# Patient Record
Sex: FEMALE | Race: WHITE | NOT HISPANIC OR LATINO | Employment: OTHER | URBAN - METROPOLITAN AREA
[De-identification: names, ages, dates, MRNs, and addresses within clinical notes are randomized per-mention and may not be internally consistent; named-entity substitution may affect disease eponyms.]

---

## 2017-01-18 ENCOUNTER — LAB (OUTPATIENT)
Dept: LAB | Facility: HOSPITAL | Age: 77
End: 2017-01-18

## 2017-01-18 ENCOUNTER — OFFICE VISIT (OUTPATIENT)
Dept: ONCOLOGY | Facility: CLINIC | Age: 77
End: 2017-01-18

## 2017-01-18 VITALS
HEART RATE: 78 BPM | BODY MASS INDEX: 25.95 KG/M2 | TEMPERATURE: 97.5 F | SYSTOLIC BLOOD PRESSURE: 174 MMHG | RESPIRATION RATE: 24 BRPM | DIASTOLIC BLOOD PRESSURE: 78 MMHG | HEIGHT: 62 IN | OXYGEN SATURATION: 94 % | WEIGHT: 141 LBS

## 2017-01-18 DIAGNOSIS — C88.0 WALDENSTROM MACROGLOBULINEMIA (HCC): ICD-10-CM

## 2017-01-18 DIAGNOSIS — C88.0 WALDENSTROM MACROGLOBULINEMIA (HCC): Primary | Chronic | ICD-10-CM

## 2017-01-18 LAB
ERYTHROCYTE [DISTWIDTH] IN BLOOD BY AUTOMATED COUNT: 15.3 % (ref 11.3–14.5)
HCT VFR BLD AUTO: 39.6 % (ref 34.5–44)
HGB BLD-MCNC: 12.8 G/DL (ref 11.5–15.5)
LYMPHOCYTES # BLD AUTO: 2 10*3/MM3 (ref 0.6–4.8)
LYMPHOCYTES NFR BLD AUTO: 33.8 % (ref 24–44)
MCH RBC QN AUTO: 26.7 PG (ref 27–31)
MCHC RBC AUTO-ENTMCNC: 32.4 G/DL (ref 32–36)
MCV RBC AUTO: 82.3 FL (ref 80–99)
MONOCYTES # BLD AUTO: 0.3 10*3/MM3 (ref 0–1)
MONOCYTES NFR BLD AUTO: 4.4 % (ref 0–12)
NEUTROPHILS # BLD AUTO: 3.7 10*3/MM3 (ref 1.5–8.3)
NEUTROPHILS NFR BLD AUTO: 61.8 % (ref 41–71)
PLATELET # BLD AUTO: 119 10*3/MM3 (ref 150–450)
PMV BLD AUTO: 8.2 FL (ref 6–12)
RBC # BLD AUTO: 4.81 10*6/MM3 (ref 3.89–5.14)
WBC NRBC COR # BLD: 6 10*3/MM3 (ref 3.5–10.8)

## 2017-01-18 PROCEDURE — 84155 ASSAY OF PROTEIN SERUM: CPT | Performed by: INTERNAL MEDICINE

## 2017-01-18 PROCEDURE — 99214 OFFICE O/P EST MOD 30 MIN: CPT | Performed by: INTERNAL MEDICINE

## 2017-01-18 PROCEDURE — 84165 PROTEIN E-PHORESIS SERUM: CPT | Performed by: INTERNAL MEDICINE

## 2017-01-18 PROCEDURE — 85025 COMPLETE CBC W/AUTO DIFF WBC: CPT

## 2017-01-18 PROCEDURE — 86334 IMMUNOFIX E-PHORESIS SERUM: CPT | Performed by: INTERNAL MEDICINE

## 2017-01-18 PROCEDURE — 36415 COLL VENOUS BLD VENIPUNCTURE: CPT | Performed by: INTERNAL MEDICINE

## 2017-01-18 NOTE — LETTER
January 18, 2017     Hilario Vasques MD  [address]  [facility] [address]    Patient: Carin Yusuf   YOB: 1940   Date of Visit: 1/18/2017       Dear Dr. Caprice MD:    Carin Yusuf was in my office today. Below is a copy of my note.    If you have questions, please do not hesitate to call me. I look forward to following Carin along with you.         Sincerely,        Kasey Alvarez MD        CC: No Recipients      PROBLEM LIST:  1. Waldenstrom's macroglobulinemia:  a) Splenomegaly was noted on a CT of the chest on 06/06/2015 done for weight  loss, decreased appetite and left upper quadrant pain.   b) Lab evaluation on 06/16/2015 showed an anemia with hemoglobin 10.2,  thrombocytopenia with platelets 116, a 0.6 g/dL IgM lambda monoclonal protein  was identified on SPEP, beta-2 microglobulin was 10.4, . 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   c) Treatment with Ibrutinib was started 07/2015.   2. Hypertension.   3. Essential tremor.   4. Scoliosis.     Subjective     HISTORY OF PRESENT ILLNESS:   Ms. Yusuf returns for six-week follow-up.   She reports fatigue and decreased energy.  This is stable over the past several months.  She still able to do all her normal activities but just does the more slowly.  She does not have a lot of energy for getting out in public places or crowds.  Her appetite is very good.  She still has some swelling in her ankles and wonders if she can increase her diuretic dose.    Past Medical History, Past Surgical History, Social History, Family History have been reviewed and are without significant changes except as mentioned.    Review of Systems   A comprehensive 14 point review of systems was performed and was negative except as mentioned.    Medications:  The current medication list was reviewed in the  "EMR    ALLERGIES:    Allergies   Allergen Reactions   • Bactrim [Sulfamethoxazole-Trimethoprim]    • Sulfa Antibiotics      Sulfa Drugs   • Vancomycin    • Zosyn [Piperacillin Sod-Tazobactam So]        Objective      Visit Vitals   • /78   • Pulse 78   • Temp 97.5 °F (36.4 °C) (Temporal Artery )   • Resp 24   • Ht 62\" (157.5 cm)   • Wt 141 lb (64 kg)   • SpO2 94%   • BMI 25.79 kg/m2        Performance Status: 1    General: well appearing female in no acute distress  Neuro: alert and oriented  HEENT: sclera anicteric, oropharynx clear  Lymphatics: no cervical, supraclavicular adenopathy  Cardiovascular: regular rate and rhythm, no murmurs  Lungs: clear to auscultation bilaterally  Abdomen: soft, nontender, nondistended.  No palpable organomegaly  Extremeties: 1+ ankle edema bilaterally  Skin: no rashes, lesions, bruising, or petechiae  Psych: mood and affect appropriate      RECENT LABS:  CBC shows a low platelet count.  Otherwise unremarkable.  From her last visit her IgM was 340 just slightly elevated.                  Assessment/Plan   Carin Yusuf is a 76-year-old female with Waldenström's macroglobulinemia who continues on imbruvica.  She continues to do well with no evidence of progression.  Her IgM was increased at the last visit but still improved compared to 3-4 months ago.  She will continue with Imbruvica 2 pills daily.    Regarding her lower extremity edema but it's possible she could increase her Lasix dose, but she should discuss this with her primary care doctor or nephrologist before making that change.  I also recommended that she continue to use compression stockings.    - hypertension - white coat hypertension, followed by Dr. Vasques.    I will see her back again in 6 weeks.                        Visit time was 25 minutes, greater than 50% spent in counseling      Kasey Alvarez MD  [facility] Hematology and Oncology    1/18/2017          CC:          "

## 2017-01-18 NOTE — MR AVS SNAPSHOT
Carin Yusuf   1/18/2017 8:00 AM   Office Visit    Dept Phone:  [phone]   Encounter #:  [id number]    Provider:  Kasey Alvarez MD   Department:  [facility] HEMATOLOGY  AND ONCOLOGY                Your Full Care Plan              Your Updated Medication List          This list is accurate as of: 1/18/17  8:20 AM.  Always use your most recent med list.                * albuterol (2.5 MG/3ML) 0.083% nebulizer solution   Commonly known as:  PROVENTIL       * albuterol 108 (90 BASE) MCG/ACT inhaler   Commonly known as:  PROVENTIL HFA;VENTOLIN HFA   Inhale 2 puffs every 4 (four) hours as needed for wheezing.       amLODIPine 5 MG tablet   Commonly known as:  NORVASC   Take 1 tablet by mouth Daily.       atenolol 100 MG tablet   Commonly known as:  TENORMIN   take 1 tablet by mouth once daily       RAYSA ASPIRIN PO       benzonatate 100 MG capsule   Commonly known as:  TESSALON PERLES   Take 1 capsule by mouth 3 (three) times a day as needed for cough.       DYMISTA NA       FLUZONE HIGH-DOSE 0.5 ML suspension prefilled syringe injection   Generic drug:  influenza vac split high-dose       furosemide 20 MG tablet   Commonly known as:  LASIX       ibrutinib 140 MG capsule capsule   Commonly known as:  IMBRUVICA   Take 2 capsules by mouth Every Night.       losartan 50 MG tablet   Commonly known as:  COZAAR   Take 1 tablet by mouth Daily.       montelukast 10 MG tablet   Commonly known as:  SINGULAIR   Take 1 tablet by mouth Every Morning.       ondansetron 4 MG tablet   Commonly known as:  ZOFRAN       QVAR 40 MCG/ACT inhaler   Generic drug:  beclomethasone       VIACTIV PO       * Notice:  This list has 2 medication(s) that are the same as other medications prescribed for you. Read the directions carefully, and ask your doctor or other care provider to review them with you.            Instructions     None    Patient Instructions History      MyChart Signup     Our  "records indicate that you have declined [facility] MyChart signup. If you would like to sign up for MyChart, please email [email or web address] or call [phone] to obtain an activation code.             Other Info from Your Visit           Your appointments     Date & Time Provider Appointment Department    Feb 17, 2017  8:30 AM CHARLENE Vasques MD Follow Up [facility] FAMILY MEDICINE    Arrive 15 minutes prior to appointment.    Mar 01, 2017  8:00 AM CHARLENE Alvarez MD FOLLOW UP [facility] HEMATOLOGY  AND ONCOLOGY        [facility]  [address]  [facility] [address]  [phone] [facility] HEMATOLOGY  AND ONCOLOGY  [place]  [address]  [facility] [id number] [phone]          Vital Signs     Blood Pressure Pulse Temperature Respirations Height Weight    174/78 78 97.5 °F (36.4 °C) (Temporal Artery ) 24 62\" (157.5 cm) 141 lb (64 kg)    Oxygen Saturation Body Mass Index Smoking Status             94% 25.79 kg/m2 Never Smoker         Problems and Diagnoses Noted     Waldenstrom macroglobulinemia        "

## 2017-01-18 NOTE — PROGRESS NOTES
"  PROBLEM LIST:  1. Waldenstrom's macroglobulinemia:  a) Splenomegaly was noted on a CT of the chest on 06/06/2015 done for weight  loss, decreased appetite and left upper quadrant pain.   b) Lab evaluation on 06/16/2015 showed an anemia with hemoglobin 10.2,  thrombocytopenia with platelets 116, a 0.6 g/dL IgM lambda monoclonal protein  was identified on SPEP, beta-2 microglobulin was 10.4, . 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   c) Treatment with Ibrutinib was started 07/2015.   2. Hypertension.   3. Essential tremor.   4. Scoliosis.     Subjective     HISTORY OF PRESENT ILLNESS:   Ms. Yusuf returns for six-week follow-up.   She reports fatigue and decreased energy.  This is stable over the past several months.  She still able to do all her normal activities but just does the more slowly.  She does not have a lot of energy for getting out in public places or crowds.  Her appetite is very good.  She still has some swelling in her ankles and wonders if she can increase her diuretic dose.    Past Medical History, Past Surgical History, Social History, Family History have been reviewed and are without significant changes except as mentioned.    Review of Systems   A comprehensive 14 point review of systems was performed and was negative except as mentioned.    Medications:  The current medication list was reviewed in the EMR    ALLERGIES:    Allergies   Allergen Reactions   • Bactrim [Sulfamethoxazole-Trimethoprim]    • Sulfa Antibiotics      Sulfa Drugs   • Vancomycin    • Zosyn [Piperacillin Sod-Tazobactam So]        Objective      Visit Vitals   • /78   • Pulse 78   • Temp 97.5 °F (36.4 °C) (Temporal Artery )   • Resp 24   • Ht 62\" (157.5 cm)   • Wt 141 lb (64 kg)   • SpO2 94%   • BMI 25.79 kg/m2        Performance Status: 1    General: well appearing female in no " acute distress  Neuro: alert and oriented  HEENT: sclera anicteric, oropharynx clear  Lymphatics: no cervical, supraclavicular adenopathy  Cardiovascular: regular rate and rhythm, no murmurs  Lungs: clear to auscultation bilaterally  Abdomen: soft, nontender, nondistended.  No palpable organomegaly  Extremeties: 1+ ankle edema bilaterally  Skin: no rashes, lesions, bruising, or petechiae  Psych: mood and affect appropriate      RECENT LABS:  CBC shows a low platelet count.  Otherwise unremarkable.  From her last visit her IgM was 340 just slightly elevated.                  Assessment/Plan   Carin Yusuf is a 76-year-old female with Waldenström's macroglobulinemia who continues on imbruvica.  She continues to do well with no evidence of progression.  Her IgM was increased at the last visit but still improved compared to 3-4 months ago.  She will continue with Imbruvica 2 pills daily.    Regarding her lower extremity edema but it's possible she could increase her Lasix dose, but she should discuss this with her primary care doctor or nephrologist before making that change.  I also recommended that she continue to use compression stockings.    - hypertension - white coat hypertension, followed by Dr. Vasques.    I will see her back again in 6 weeks.                        Visit time was 25 minutes, greater than 50% spent in counseling      Kasey Alvarez MD  [facility] Hematology and Oncology    1/18/2017          CC:

## 2017-02-06 RX ORDER — ATENOLOL 100 MG/1
TABLET ORAL
Qty: 90 TABLET | Refills: 0 | Status: SHIPPED | OUTPATIENT
Start: 2017-02-06 | End: 2017-05-03 | Stop reason: SDUPTHER

## 2017-02-17 ENCOUNTER — OFFICE VISIT (OUTPATIENT)
Dept: FAMILY MEDICINE CLINIC | Facility: CLINIC | Age: 77
End: 2017-02-17

## 2017-02-17 VITALS
SYSTOLIC BLOOD PRESSURE: 172 MMHG | DIASTOLIC BLOOD PRESSURE: 78 MMHG | RESPIRATION RATE: 16 BRPM | OXYGEN SATURATION: 96 % | HEIGHT: 62 IN | BODY MASS INDEX: 25.8 KG/M2 | WEIGHT: 140.2 LBS | HEART RATE: 54 BPM

## 2017-02-17 DIAGNOSIS — R53.83 OTHER FATIGUE: ICD-10-CM

## 2017-02-17 DIAGNOSIS — R73.03 PREDIABETES: Chronic | ICD-10-CM

## 2017-02-17 DIAGNOSIS — I10 ESSENTIAL HYPERTENSION: Primary | Chronic | ICD-10-CM

## 2017-02-17 PROCEDURE — 99213 OFFICE O/P EST LOW 20 MIN: CPT | Performed by: FAMILY MEDICINE

## 2017-02-17 RX ORDER — LOSARTAN POTASSIUM 100 MG/1
100 TABLET ORAL DAILY
Qty: 90 TABLET | Refills: 1 | Status: SHIPPED | OUTPATIENT
Start: 2017-02-17 | End: 2017-09-27 | Stop reason: SDUPTHER

## 2017-02-17 RX ORDER — FUROSEMIDE 20 MG/1
TABLET ORAL
Qty: 60 TABLET | Refills: 5 | Status: SHIPPED | OUTPATIENT
Start: 2017-02-17 | End: 2018-03-09 | Stop reason: SDUPTHER

## 2017-02-17 RX ORDER — ALBUTEROL SULFATE 2.5 MG/3ML
SOLUTION RESPIRATORY (INHALATION)
Qty: 225 ML | Refills: 0 | Status: SHIPPED | OUTPATIENT
Start: 2017-02-17 | End: 2018-07-31 | Stop reason: SDUPTHER

## 2017-02-17 RX ORDER — MONTELUKAST SODIUM 10 MG/1
10 TABLET ORAL EVERY MORNING
Qty: 90 TABLET | Refills: 1 | Status: SHIPPED | OUTPATIENT
Start: 2017-02-17 | End: 2017-11-22 | Stop reason: SDUPTHER

## 2017-02-17 NOTE — PROGRESS NOTES
"Subjective   Carin Yusuf is a 76 y.o. female.     Hypertension   This is a chronic problem. The problem is unchanged. The problem is uncontrolled (Systolic is running on the high side last 3 visits). Associated symptoms include malaise/fatigue. Pertinent negatives include no chest pain, headaches, orthopnea, palpitations, peripheral edema or shortness of breath. Past treatments include angiotensin blockers, calcium channel blockers, beta blockers and diuretics. The current treatment provides moderate improvement. There are no compliance problems.  Hypertensive end-organ damage includes kidney disease. There is no history of angina, CAD/MI or CVA.   Fatigue   This is a chronic problem. The current episode started more than 1 month ago. The problem occurs daily. The problem has been unchanged. Associated symptoms include fatigue. Pertinent negatives include no chest pain, chills, coughing, fever, headaches, nausea, rash, swollen glands, urinary symptoms, vertigo or weakness. The symptoms are aggravated by exertion (Tires easily).        The following portions of the patient's history were reviewed and updated as appropriate: allergies, current medications, past social history and problem list.    Review of Systems   Constitutional: Positive for fatigue and malaise/fatigue. Negative for chills, fever and unexpected weight change.   HENT: Negative for ear discharge and postnasal drip.    Respiratory: Negative for cough, chest tightness and shortness of breath.    Cardiovascular: Negative for chest pain, palpitations, orthopnea and leg swelling.   Gastrointestinal: Negative for nausea.   Skin: Negative for color change and rash.   Neurological: Negative for dizziness, vertigo, syncope, weakness and headaches.       Objective   Visit Vitals   • /78   • Pulse 54   • Resp 16   • Ht 62\" (157.5 cm)   • Wt 140 lb 3.2 oz (63.6 kg)   • SpO2 96%   • BMI 25.64 kg/m2     Physical Exam    Assessment/Plan   Problem List " Items Addressed This Visit        Cardiovascular and Mediastinum    Hypertension - Primary (Chronic)    Relevant Medications    losartan (COZAAR) 100 MG tablet    furosemide (LASIX) 20 MG tablet       Other    Prediabetes (Chronic)      Other Visit Diagnoses     Other fatigue              New Medications Ordered This Visit   Medications   • losartan (COZAAR) 100 MG tablet     Sig: Take 1 tablet by mouth Daily.     Dispense:  90 tablet     Refill:  1   • montelukast (SINGULAIR) 10 MG tablet     Sig: Take 1 tablet by mouth Every Morning.     Dispense:  90 tablet     Refill:  1   • furosemide (LASIX) 20 MG tablet     Si or 2 qd as directed     Dispense:  60 tablet     Refill:  5   • albuterol (PROVENTIL) (2.5 MG/3ML) 0.083% nebulizer solution     Sig: Use one  Vial via nebulizer by mouth every 8 hours as needed for wheezing     Dispense:  225 mL     Refill:  0   • hydrocortisone 2.5 % cream     Sig: Apply  topically 2 (Two) Times a Day.     Dispense:  30 g     Refill:  1     Carin has several issues that could contribute to her fatigue including her macroglobulinemia, her medications, and her age. We will keep follow-up with nephrology and we may consider a further cardiology evaluation.

## 2017-02-20 ENCOUNTER — TELEPHONE (OUTPATIENT)
Dept: ONCOLOGY | Facility: CLINIC | Age: 77
End: 2017-02-20

## 2017-02-20 NOTE — TELEPHONE ENCOUNTER
----- Message from Eleanor Owens sent at 2/20/2017  3:35 PM EST -----  Regarding: BALWINDER-BP ISSUES AND RASH  Contact: [phone]  Patient called she is having some problems with her BP it is going up and down she is feeling dizzy, and also has a rash please advise.

## 2017-02-20 NOTE — TELEPHONE ENCOUNTER
Called and spoke with patient. She saw her PCP on Friday for elevated BP and rash. He prescribed an ointment for the rash, which she is picking up this evening, and increased her Losartan to 100mg. She is calling to let us know that both problems are unchanged. I told her it is possible this is a reaction from the Imbruvica, but very unlikley since she has been on it for so long. I advised she call Dr. Vasques back and let him know her BP is still high. He may want to do further testing.     Patient told me she has checked her BP multiple times today and gotten these readings: 142/60, 199/78, 187/84, 172/69.     Advised that if that top number goes over 200 and she has other symptoms such as continued dizziness or headache before she can get back in to see Dr. Vasques she needs to go to the Emergency Room for evaluation.

## 2017-03-01 ENCOUNTER — OFFICE VISIT (OUTPATIENT)
Dept: ONCOLOGY | Facility: CLINIC | Age: 77
End: 2017-03-01

## 2017-03-01 ENCOUNTER — LAB (OUTPATIENT)
Dept: LAB | Facility: HOSPITAL | Age: 77
End: 2017-03-01

## 2017-03-01 VITALS
HEIGHT: 62 IN | HEART RATE: 59 BPM | RESPIRATION RATE: 18 BRPM | SYSTOLIC BLOOD PRESSURE: 184 MMHG | DIASTOLIC BLOOD PRESSURE: 88 MMHG | TEMPERATURE: 98.7 F | BODY MASS INDEX: 26.13 KG/M2 | WEIGHT: 142 LBS

## 2017-03-01 DIAGNOSIS — G25.0 HEREDITARY ESSENTIAL TREMOR: Chronic | ICD-10-CM

## 2017-03-01 DIAGNOSIS — C88.0 WALDENSTROM MACROGLOBULINEMIA (HCC): ICD-10-CM

## 2017-03-01 DIAGNOSIS — IMO0002 SOLITARY KIDNEY: Primary | ICD-10-CM

## 2017-03-01 LAB
ALBUMIN SERPL-MCNC: 4.1 G/DL (ref 3.2–4.8)
ALBUMIN/GLOB SERPL: 2.1 G/DL (ref 1.5–2.5)
ALP SERPL-CCNC: 96 U/L (ref 25–100)
ALT SERPL W P-5'-P-CCNC: 9 U/L (ref 7–40)
ANION GAP SERPL CALCULATED.3IONS-SCNC: 5 MMOL/L (ref 3–11)
AST SERPL-CCNC: 20 U/L (ref 0–33)
BACTERIA UR QL AUTO: ABNORMAL /HPF
BILIRUB SERPL-MCNC: 1 MG/DL (ref 0.3–1.2)
BILIRUB UR QL STRIP: NEGATIVE
BUN BLD-MCNC: 24 MG/DL (ref 9–23)
BUN/CREAT SERPL: 18.5 (ref 7–25)
CALCIUM SPEC-SCNC: 9.9 MG/DL (ref 8.7–10.4)
CHLORIDE SERPL-SCNC: 105 MMOL/L (ref 99–109)
CLARITY UR: CLEAR
CO2 SERPL-SCNC: 33 MMOL/L (ref 20–31)
COLOR UR: YELLOW
CREAT BLD-MCNC: 1.3 MG/DL (ref 0.6–1.3)
CREAT UR-MCNC: 103.4 MG/DL
ERYTHROCYTE [DISTWIDTH] IN BLOOD BY AUTOMATED COUNT: 14.7 % (ref 11.3–14.5)
GFR SERPL CREATININE-BSD FRML MDRD: 40 ML/MIN/1.73
GLOBULIN UR ELPH-MCNC: 2 GM/DL
GLUCOSE BLD-MCNC: 94 MG/DL (ref 70–100)
GLUCOSE UR STRIP-MCNC: NEGATIVE MG/DL
HCT VFR BLD AUTO: 38.4 % (ref 34.5–44)
HGB BLD-MCNC: 12.5 G/DL (ref 11.5–15.5)
HGB UR QL STRIP.AUTO: ABNORMAL
HYALINE CASTS UR QL AUTO: ABNORMAL /LPF
KETONES UR QL STRIP: NEGATIVE
LEUKOCYTE ESTERASE UR QL STRIP.AUTO: ABNORMAL
LYMPHOCYTES # BLD AUTO: 1.8 10*3/MM3 (ref 0.6–4.8)
LYMPHOCYTES NFR BLD AUTO: 34.7 % (ref 24–44)
MCH RBC QN AUTO: 26.3 PG (ref 27–31)
MCHC RBC AUTO-ENTMCNC: 32.7 G/DL (ref 32–36)
MCV RBC AUTO: 80.5 FL (ref 80–99)
MONOCYTES # BLD AUTO: 0.1 10*3/MM3 (ref 0–1)
MONOCYTES NFR BLD AUTO: 1.3 % (ref 0–12)
NEUTROPHILS # BLD AUTO: 3.3 10*3/MM3 (ref 1.5–8.3)
NEUTROPHILS NFR BLD AUTO: 64 % (ref 41–71)
NITRITE UR QL STRIP: NEGATIVE
PH UR STRIP.AUTO: 7 [PH] (ref 5–8)
PHOSPHATE SERPL-MCNC: 4.4 MG/DL (ref 2.4–5.1)
PLATELET # BLD AUTO: 94 10*3/MM3 (ref 150–450)
PMV BLD AUTO: 7.9 FL (ref 6–12)
POTASSIUM BLD-SCNC: 4.4 MMOL/L (ref 3.5–5.5)
PROT SERPL-MCNC: 6.1 G/DL (ref 5.7–8.2)
PROT UR QL STRIP: ABNORMAL
PROT UR-MCNC: 86 MG/DL (ref 1–14)
RBC # BLD AUTO: 4.77 10*6/MM3 (ref 3.89–5.14)
RBC # UR: ABNORMAL /HPF
REF LAB TEST METHOD: ABNORMAL
SODIUM BLD-SCNC: 143 MMOL/L (ref 132–146)
SP GR UR STRIP: 1.01 (ref 1–1.03)
SQUAMOUS #/AREA URNS HPF: ABNORMAL /HPF
UROBILINOGEN UR QL STRIP: ABNORMAL
WBC NRBC COR # BLD: 5.2 10*3/MM3 (ref 3.5–10.8)
WBC UR QL AUTO: ABNORMAL /HPF

## 2017-03-01 PROCEDURE — 99214 OFFICE O/P EST MOD 30 MIN: CPT | Performed by: INTERNAL MEDICINE

## 2017-03-01 PROCEDURE — 82570 ASSAY OF URINE CREATININE: CPT | Performed by: INTERNAL MEDICINE

## 2017-03-01 PROCEDURE — 84165 PROTEIN E-PHORESIS SERUM: CPT | Performed by: INTERNAL MEDICINE

## 2017-03-01 PROCEDURE — 80053 COMPREHEN METABOLIC PANEL: CPT | Performed by: INTERNAL MEDICINE

## 2017-03-01 PROCEDURE — 82306 VITAMIN D 25 HYDROXY: CPT | Performed by: INTERNAL MEDICINE

## 2017-03-01 PROCEDURE — 36415 COLL VENOUS BLD VENIPUNCTURE: CPT

## 2017-03-01 PROCEDURE — 84156 ASSAY OF PROTEIN URINE: CPT | Performed by: INTERNAL MEDICINE

## 2017-03-01 PROCEDURE — 84155 ASSAY OF PROTEIN SERUM: CPT | Performed by: INTERNAL MEDICINE

## 2017-03-01 PROCEDURE — 83970 ASSAY OF PARATHORMONE: CPT | Performed by: INTERNAL MEDICINE

## 2017-03-01 PROCEDURE — 81001 URINALYSIS AUTO W/SCOPE: CPT | Performed by: INTERNAL MEDICINE

## 2017-03-01 PROCEDURE — 86334 IMMUNOFIX E-PHORESIS SERUM: CPT | Performed by: INTERNAL MEDICINE

## 2017-03-01 PROCEDURE — 84100 ASSAY OF PHOSPHORUS: CPT | Performed by: INTERNAL MEDICINE

## 2017-03-01 PROCEDURE — 85025 COMPLETE CBC W/AUTO DIFF WBC: CPT

## 2017-03-01 NOTE — PROGRESS NOTES
"  PROBLEM LIST:  1. Waldenstrom's macroglobulinemia:  a) Splenomegaly was noted on a CT of the chest on 06/06/2015 done for weight  loss, decreased appetite and left upper quadrant pain.   b) Lab evaluation on 06/16/2015 showed an anemia with hemoglobin 10.2,  thrombocytopenia with platelets 116, a 0.6 g/dL IgM lambda monoclonal protein  was identified on SPEP, beta-2 microglobulin was 10.4, . 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   c) Treatment with Ibrutinib was started 07/2015.   2. Hypertension.   3. Essential tremor.   4. Scoliosis.     Subjective     HISTORY OF PRESENT ILLNESS:   Ms. Yusuf returns for six-week follow-up.     Past Medical History, Past Surgical History, Social History, Family History have been reviewed and are without significant changes except as mentioned.    Review of Systems   A comprehensive 14 point review of systems was performed and was negative except as mentioned.    Medications:  The current medication list was reviewed in the EMR    ALLERGIES:    Allergies   Allergen Reactions   • Bactrim [Sulfamethoxazole-Trimethoprim]    • Sulfa Antibiotics      Sulfa Drugs   • Vancomycin    • Zosyn [Piperacillin Sod-Tazobactam So]        Objective      Visit Vitals   • BP (!) 228/91   • Pulse 62   • Temp 98.7 °F (37.1 °C)   • Resp 18   • Ht 62\" (157.5 cm)   • Wt 142 lb (64.4 kg)   • BMI 25.97 kg/m2        Performance Status: 1    General: well appearing female in no acute distress  Neuro: alert and oriented  HEENT: sclera anicteric, oropharynx clear  Lymphatics: no cervical, supraclavicular adenopathy  Cardiovascular: regular rate and rhythm, no murmurs  Lungs: clear to auscultation bilaterally  Abdomen: soft, nontender, nondistended.  No palpable organomegaly  Extremeties: 1+ ankle edema bilaterally  Skin: no rashes, lesions, bruising, or petechiae  Psych: " mood and affect appropriate      RECENT LABS:  CBC hgb 12.5, plt 94.  IGM from last visit 135.                Assessment/Plan   Carin Yusuf is a 76-year-old female with Waldenström's macroglobulinemia who continues on imbruvica.  Her labs are stable and she has no evidence of disease progression.    - fatigue - may be disease related or side effect of imbruvica.  i sometimes use ritalin in this situation, but i don't think we can do that with her blood pressure as high as it is.  I think the potential side effects of other alternative treatments for her Waldenstrom's are likely greater than her current issues.    - hypertension - white coat hypertension, followed by Dr. Vasques.  Had a recent increase in losartan but has not taken her dose today.  Will recheck BP before she levaes.    I will see her back again in 6 weeks.                        Visit time was 25 minutes, greater than 50% spent in counseling      Kasey Alvarez MD  [facility] Hematology and Oncology    3/1/2017          CC:

## 2017-03-02 LAB
25(OH)D3 SERPL-MCNC: 38.5 NG/ML
ALBUMIN SERPL-MCNC: 3.7 G/DL (ref 2.9–4.4)
ALBUMIN/GLOB SERPL: 2 {RATIO} (ref 0.7–1.7)
ALPHA1 GLOB FLD ELPH-MCNC: 0.2 G/DL (ref 0–0.4)
ALPHA2 GLOB SERPL ELPH-MCNC: 0.6 G/DL (ref 0.4–1)
B-GLOBULIN SERPL ELPH-MCNC: 0.8 G/DL (ref 0.7–1.3)
CALCIUM SERPL-MCNC: 9.3 MG/DL (ref 8.7–10.3)
GAMMA GLOB SERPL ELPH-MCNC: 0.3 G/DL (ref 0.4–1.8)
GLOBULIN SER CALC-MCNC: 1.9 G/DL (ref 2.2–3.9)
IGA SERPL-MCNC: 28 MG/DL (ref 64–422)
IGG SERPL-MCNC: 398 MG/DL (ref 700–1600)
IGM SERPL-MCNC: 120 MG/DL (ref 26–217)
INTACT PTH: ABNORMAL
INTERPRETATION SERPL IEP-IMP: ABNORMAL
Lab: ABNORMAL
M-SPIKE: 0.1 G/DL
PROT SERPL-MCNC: 5.6 G/DL (ref 6–8.5)
PTH-INTACT SERPL-MCNC: 68 PG/ML (ref 15–65)

## 2017-04-12 ENCOUNTER — LAB (OUTPATIENT)
Dept: LAB | Facility: HOSPITAL | Age: 77
End: 2017-04-12

## 2017-04-12 ENCOUNTER — OFFICE VISIT (OUTPATIENT)
Dept: ONCOLOGY | Facility: CLINIC | Age: 77
End: 2017-04-12

## 2017-04-12 VITALS
BODY MASS INDEX: 25.95 KG/M2 | DIASTOLIC BLOOD PRESSURE: 82 MMHG | HEIGHT: 62 IN | SYSTOLIC BLOOD PRESSURE: 194 MMHG | TEMPERATURE: 98.2 F | WEIGHT: 141 LBS | HEART RATE: 57 BPM | RESPIRATION RATE: 18 BRPM

## 2017-04-12 DIAGNOSIS — C88.0 WALDENSTROM MACROGLOBULINEMIA (HCC): Primary | Chronic | ICD-10-CM

## 2017-04-12 DIAGNOSIS — C88.0 WALDENSTROMS MACROGLOBULINEMIA (HCC): Primary | ICD-10-CM

## 2017-04-12 DIAGNOSIS — C88.0 WALDENSTROM MACROGLOBULINEMIA (HCC): ICD-10-CM

## 2017-04-12 DIAGNOSIS — R80.9 PROTEINURIA: ICD-10-CM

## 2017-04-12 LAB
ALBUMIN SERPL-MCNC: 4.2 G/DL (ref 3.2–4.8)
ALBUMIN/GLOB SERPL: 2.1 G/DL (ref 1.5–2.5)
ALP SERPL-CCNC: 94 U/L (ref 25–100)
ALT SERPL W P-5'-P-CCNC: 8 U/L (ref 7–40)
ANION GAP SERPL CALCULATED.3IONS-SCNC: 4 MMOL/L (ref 3–11)
AST SERPL-CCNC: 22 U/L (ref 0–33)
BACTERIA UR QL AUTO: NORMAL /HPF
BILIRUB SERPL-MCNC: 1 MG/DL (ref 0.3–1.2)
BILIRUB UR QL STRIP: NEGATIVE
BUN BLD-MCNC: 29 MG/DL (ref 9–23)
BUN/CREAT SERPL: 17.1 (ref 7–25)
CALCIUM SPEC-SCNC: 9.8 MG/DL (ref 8.7–10.4)
CHLORIDE SERPL-SCNC: 98 MMOL/L (ref 99–109)
CLARITY UR: CLEAR
CO2 SERPL-SCNC: 38 MMOL/L (ref 20–31)
COLOR UR: YELLOW
CREAT BLD-MCNC: 1.7 MG/DL (ref 0.6–1.3)
ERYTHROCYTE [DISTWIDTH] IN BLOOD BY AUTOMATED COUNT: 15 % (ref 11.3–14.5)
GFR SERPL CREATININE-BSD FRML MDRD: 29 ML/MIN/1.73
GLOBULIN UR ELPH-MCNC: 2 GM/DL
GLUCOSE BLD-MCNC: 95 MG/DL (ref 70–100)
GLUCOSE UR STRIP-MCNC: NEGATIVE MG/DL
HCT VFR BLD AUTO: 41.3 % (ref 34.5–44)
HGB BLD-MCNC: 13 G/DL (ref 11.5–15.5)
HGB UR QL STRIP.AUTO: NEGATIVE
HYALINE CASTS UR QL AUTO: NORMAL /LPF
KETONES UR QL STRIP: NEGATIVE
LEUKOCYTE ESTERASE UR QL STRIP.AUTO: NEGATIVE
LYMPHOCYTES # BLD AUTO: 1.9 10*3/MM3 (ref 0.6–4.8)
LYMPHOCYTES NFR BLD AUTO: 36.4 % (ref 24–44)
MCH RBC QN AUTO: 25.9 PG (ref 27–31)
MCHC RBC AUTO-ENTMCNC: 31.5 G/DL (ref 32–36)
MCV RBC AUTO: 82.2 FL (ref 80–99)
MONOCYTES # BLD AUTO: 0.2 10*3/MM3 (ref 0–1)
MONOCYTES NFR BLD AUTO: 3.1 % (ref 0–12)
NEUTROPHILS # BLD AUTO: 3.2 10*3/MM3 (ref 1.5–8.3)
NEUTROPHILS NFR BLD AUTO: 60.5 % (ref 41–71)
NITRITE UR QL STRIP: NEGATIVE
PH UR STRIP.AUTO: 7 [PH] (ref 5–8)
PHOSPHATE SERPL-MCNC: 4.6 MG/DL (ref 2.4–5.1)
PLATELET # BLD AUTO: 122 10*3/MM3 (ref 150–450)
PMV BLD AUTO: 9.1 FL (ref 6–12)
POTASSIUM BLD-SCNC: 3.8 MMOL/L (ref 3.5–5.5)
PROT SERPL-MCNC: 6.2 G/DL (ref 5.7–8.2)
PROT UR QL STRIP: NEGATIVE
PROT UR-MCNC: 6 MG/DL (ref 1–14)
RBC # BLD AUTO: 5.02 10*6/MM3 (ref 3.89–5.14)
RBC # UR: NORMAL /HPF
REF LAB TEST METHOD: NORMAL
SODIUM BLD-SCNC: 140 MMOL/L (ref 132–146)
SP GR UR STRIP: 1.01 (ref 1–1.03)
SQUAMOUS #/AREA URNS HPF: NORMAL /HPF
UROBILINOGEN UR QL STRIP: NORMAL
WBC NRBC COR # BLD: 5.3 10*3/MM3 (ref 3.5–10.8)
WBC UR QL AUTO: NORMAL /HPF

## 2017-04-12 PROCEDURE — 85025 COMPLETE CBC W/AUTO DIFF WBC: CPT

## 2017-04-12 PROCEDURE — 86334 IMMUNOFIX E-PHORESIS SERUM: CPT

## 2017-04-12 PROCEDURE — 84156 ASSAY OF PROTEIN URINE: CPT

## 2017-04-12 PROCEDURE — 81001 URINALYSIS AUTO W/SCOPE: CPT

## 2017-04-12 PROCEDURE — 84100 ASSAY OF PHOSPHORUS: CPT

## 2017-04-12 PROCEDURE — 80053 COMPREHEN METABOLIC PANEL: CPT

## 2017-04-12 PROCEDURE — 84165 PROTEIN E-PHORESIS SERUM: CPT

## 2017-04-12 PROCEDURE — 84155 ASSAY OF PROTEIN SERUM: CPT

## 2017-04-12 PROCEDURE — 99213 OFFICE O/P EST LOW 20 MIN: CPT | Performed by: INTERNAL MEDICINE

## 2017-04-12 PROCEDURE — 36415 COLL VENOUS BLD VENIPUNCTURE: CPT

## 2017-04-12 RX ORDER — CHLORTHALIDONE 25 MG/1
25 TABLET ORAL DAILY
COMMUNITY
Start: 2017-03-24 | End: 2019-06-26

## 2017-04-12 NOTE — PROGRESS NOTES
"  PROBLEM LIST:  1. Waldenstrom's macroglobulinemia:  a) Splenomegaly was noted on a CT of the chest on 06/06/2015 done for weight  loss, decreased appetite and left upper quadrant pain.   b) Lab evaluation on 06/16/2015 showed an anemia with hemoglobin 10.2,  thrombocytopenia with platelets 116, a 0.6 g/dL IgM lambda monoclonal protein  was identified on SPEP, beta-2 microglobulin was 10.4, . 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   c) Treatment with Ibrutinib was started 07/2015.   2. Hypertension.   3. Essential tremor.   4. Scoliosis.     Subjective     HISTORY OF PRESENT ILLNESS:   Ms. Yusuf returns for six-week follow-up.  He has started a new blood pressure medication recently and says that this has helped.  Her energy level seems better with her blood pressure under better control.  She brings in a record of her blood pressures this past month and they're mostly in the 120s over 70s.  She still has elevated blood pressure when she is seen here in the office.  She reports occasional shooting pains in her left abdomen.  This lasts only a few seconds and happens once or twice a day.  It does not seem to be related to food.  She does report having gas.    Past Medical History, Past Surgical History, Social History, Family History have been reviewed and are without significant changes except as mentioned.    Review of Systems   A comprehensive 14 point review of systems was performed and was negative except as mentioned.    Medications:  The current medication list was reviewed in the EMR    ALLERGIES:    Allergies   Allergen Reactions   • Bactrim [Sulfamethoxazole-Trimethoprim]    • Sulfa Antibiotics      Sulfa Drugs   • Vancomycin    • Zosyn [Piperacillin Sod-Tazobactam So]        Objective      BP (!) 194/82  Pulse 57  Temp 98.2 °F (36.8 °C)  Resp 18  Ht 62\" (157.5 " cm)  Wt 141 lb (64 kg)  BMI 25.79 kg/m2     Performance Status: 1    General: well appearing female in no acute distress  Neuro: alert and oriented  HEENT: sclera anicteric, oropharynx clear  Lymphatics: no cervical, supraclavicular adenopathy  Cardiovascular: regular rate and rhythm, no murmurs  Lungs: clear to auscultation bilaterally  Abdomen: soft, nontender, nondistended.  No palpable organomegaly  Extremeties: 1+ ankle edema bilaterally  Skin: no rashes, lesions, bruising, or petechiae  Psych: mood and affect appropriate      RECENT LABS:  IgM 120 on 3/1.  M-spike 0.1 g/dl                Assessment/Plan   Carin Yusuf is a 76-year-old female with Waldenström's macroglobulinemia who continues on imbruvica.  She continues to have a low but detectable IgM protein consistent with disease control.  We will continue at the current dose.  We discussed that the duration of response can be years, although disease progression is still possible.  She has been on Imbruvica for almost 2 years at this point.    - fatigue - stable.    - hypertension - white coat hypertension.  Blood pressure at home appears to be adequate    I will see her back again in 6 weeks.                        Visit time was 15 minutes, greater than 50% spent in counseling      Kasey Alvarez MD  [facility] Hematology and Oncology    4/12/2017          CC:

## 2017-04-13 LAB
ALBUMIN SERPL-MCNC: 3.8 G/DL (ref 2.9–4.4)
ALBUMIN/GLOB SERPL: 1.9 {RATIO} (ref 0.7–1.7)
ALPHA1 GLOB FLD ELPH-MCNC: 0.2 G/DL (ref 0–0.4)
ALPHA2 GLOB SERPL ELPH-MCNC: 0.7 G/DL (ref 0.4–1)
B-GLOBULIN SERPL ELPH-MCNC: 0.8 G/DL (ref 0.7–1.3)
GAMMA GLOB SERPL ELPH-MCNC: 0.4 G/DL (ref 0.4–1.8)
GLOBULIN SER CALC-MCNC: 2.1 G/DL (ref 2.2–3.9)
IGA SERPL-MCNC: 18 MG/DL (ref 64–422)
IGG SERPL-MCNC: 392 MG/DL (ref 700–1600)
IGM SERPL-MCNC: 134 MG/DL (ref 26–217)
INTERPRETATION SERPL IEP-IMP: ABNORMAL
Lab: ABNORMAL
M-SPIKE: 0.1 G/DL
PROT SERPL-MCNC: 5.9 G/DL (ref 6–8.5)

## 2017-05-03 RX ORDER — AMLODIPINE BESYLATE 5 MG/1
TABLET ORAL
Qty: 90 TABLET | Refills: 0 | Status: SHIPPED | OUTPATIENT
Start: 2017-05-03 | End: 2017-08-23 | Stop reason: SDUPTHER

## 2017-05-03 RX ORDER — ATENOLOL 100 MG/1
TABLET ORAL
Qty: 90 TABLET | Refills: 0 | Status: SHIPPED | OUTPATIENT
Start: 2017-05-03 | End: 2017-08-01 | Stop reason: SDUPTHER

## 2017-05-23 ENCOUNTER — OFFICE VISIT (OUTPATIENT)
Dept: FAMILY MEDICINE CLINIC | Facility: CLINIC | Age: 77
End: 2017-05-23

## 2017-05-23 VITALS
WEIGHT: 142 LBS | TEMPERATURE: 97.9 F | SYSTOLIC BLOOD PRESSURE: 142 MMHG | RESPIRATION RATE: 16 BRPM | HEART RATE: 60 BPM | DIASTOLIC BLOOD PRESSURE: 70 MMHG | BODY MASS INDEX: 26.13 KG/M2 | OXYGEN SATURATION: 96 % | HEIGHT: 62 IN

## 2017-05-23 DIAGNOSIS — I10 ESSENTIAL HYPERTENSION: Chronic | ICD-10-CM

## 2017-05-23 DIAGNOSIS — E55.9 VITAMIN D DEFICIENCY: ICD-10-CM

## 2017-05-23 DIAGNOSIS — C88.0 WALDENSTROM MACROGLOBULINEMIA (HCC): Chronic | ICD-10-CM

## 2017-05-23 DIAGNOSIS — Z00.00 MEDICARE ANNUAL WELLNESS VISIT, SUBSEQUENT: Primary | ICD-10-CM

## 2017-05-23 PROBLEM — N18.30 CHRONIC RENAL IMPAIRMENT, STAGE 3 (MODERATE) (HCC): Status: ACTIVE | Noted: 2017-05-23

## 2017-05-23 LAB
25(OH)D3 SERPL-MCNC: 42.3 NG/ML
ALBUMIN SERPL-MCNC: 4.2 G/DL (ref 3.2–4.8)
ALBUMIN/GLOB SERPL: 2.2 G/DL (ref 1.5–2.5)
ALP SERPL-CCNC: 86 U/L (ref 25–100)
ALT SERPL W P-5'-P-CCNC: 4 U/L (ref 7–40)
ANION GAP SERPL CALCULATED.3IONS-SCNC: 3 MMOL/L (ref 3–11)
ARTICHOKE IGE QN: 93 MG/DL (ref 0–130)
AST SERPL-CCNC: 18 U/L (ref 0–33)
BASOPHILS # BLD AUTO: 0.02 10*3/MM3 (ref 0–0.2)
BASOPHILS NFR BLD AUTO: 0.3 % (ref 0–1)
BILIRUB BLD-MCNC: NEGATIVE MG/DL
BILIRUB SERPL-MCNC: 1.1 MG/DL (ref 0.3–1.2)
BUN BLD-MCNC: 28 MG/DL (ref 9–23)
BUN/CREAT SERPL: 17.5 (ref 7–25)
CALCIUM SPEC-SCNC: 9.4 MG/DL (ref 8.7–10.4)
CHLORIDE SERPL-SCNC: 99 MMOL/L (ref 99–109)
CHOLEST SERPL-MCNC: 156 MG/DL (ref 0–200)
CLARITY, POC: CLEAR
CO2 SERPL-SCNC: 38 MMOL/L (ref 20–31)
COLOR UR: YELLOW
CREAT BLD-MCNC: 1.6 MG/DL (ref 0.6–1.3)
DEPRECATED RDW RBC AUTO: 44.7 FL (ref 37–54)
EOSINOPHIL # BLD AUTO: 0.06 10*3/MM3 (ref 0.1–0.3)
EOSINOPHIL NFR BLD AUTO: 1 % (ref 0–3)
ERYTHROCYTE [DISTWIDTH] IN BLOOD BY AUTOMATED COUNT: 14.3 % (ref 11.3–14.5)
GFR SERPL CREATININE-BSD FRML MDRD: 31 ML/MIN/1.73
GLOBULIN UR ELPH-MCNC: 1.9 GM/DL
GLUCOSE BLD-MCNC: 96 MG/DL (ref 70–100)
GLUCOSE UR STRIP-MCNC: NEGATIVE MG/DL
HCT VFR BLD AUTO: 42.2 % (ref 34.5–44)
HDLC SERPL-MCNC: 62 MG/DL (ref 40–60)
HGB BLD-MCNC: 12.9 G/DL (ref 11.5–15.5)
IMM GRANULOCYTES # BLD: 0.01 10*3/MM3 (ref 0–0.03)
IMM GRANULOCYTES NFR BLD: 0.2 % (ref 0–0.6)
KETONES UR QL: NEGATIVE
LEUKOCYTE EST, POC: NEGATIVE
LYMPHOCYTES # BLD AUTO: 1.93 10*3/MM3 (ref 0.6–4.8)
LYMPHOCYTES NFR BLD AUTO: 33.5 % (ref 24–44)
MCH RBC QN AUTO: 26.3 PG (ref 27–31)
MCHC RBC AUTO-ENTMCNC: 30.6 G/DL (ref 32–36)
MCV RBC AUTO: 86.1 FL (ref 80–99)
MONOCYTES # BLD AUTO: 0.36 10*3/MM3 (ref 0–1)
MONOCYTES NFR BLD AUTO: 6.3 % (ref 0–12)
NEUTROPHILS # BLD AUTO: 3.38 10*3/MM3 (ref 1.5–8.3)
NEUTROPHILS NFR BLD AUTO: 58.7 % (ref 41–71)
NITRITE UR-MCNC: NEGATIVE MG/ML
PH UR: 7.5 [PH] (ref 5–8)
PLATELET # BLD AUTO: 120 10*3/MM3 (ref 150–450)
PMV BLD AUTO: 12.2 FL (ref 6–12)
POTASSIUM BLD-SCNC: 3.8 MMOL/L (ref 3.5–5.5)
PROT SERPL-MCNC: 6.1 G/DL (ref 5.7–8.2)
PROT UR STRIP-MCNC: ABNORMAL MG/DL
RBC # BLD AUTO: 4.9 10*6/MM3 (ref 3.89–5.14)
RBC # UR STRIP: NEGATIVE /UL
SODIUM BLD-SCNC: 140 MMOL/L (ref 132–146)
SP GR UR: 1.02 (ref 1–1.03)
TRIGL SERPL-MCNC: 43 MG/DL (ref 0–150)
TSH SERPL DL<=0.05 MIU/L-ACNC: 4.27 MIU/ML (ref 0.35–5.35)
UROBILINOGEN UR QL: NORMAL
WBC NRBC COR # BLD: 5.76 10*3/MM3 (ref 3.5–10.8)

## 2017-05-23 PROCEDURE — 93000 ELECTROCARDIOGRAM COMPLETE: CPT | Performed by: FAMILY MEDICINE

## 2017-05-23 PROCEDURE — 85025 COMPLETE CBC W/AUTO DIFF WBC: CPT | Performed by: FAMILY MEDICINE

## 2017-05-23 PROCEDURE — 80053 COMPREHEN METABOLIC PANEL: CPT | Performed by: FAMILY MEDICINE

## 2017-05-23 PROCEDURE — 36415 COLL VENOUS BLD VENIPUNCTURE: CPT | Performed by: FAMILY MEDICINE

## 2017-05-23 PROCEDURE — 81003 URINALYSIS AUTO W/O SCOPE: CPT | Performed by: FAMILY MEDICINE

## 2017-05-23 PROCEDURE — G0439 PPPS, SUBSEQ VISIT: HCPCS | Performed by: FAMILY MEDICINE

## 2017-05-23 PROCEDURE — 80061 LIPID PANEL: CPT | Performed by: FAMILY MEDICINE

## 2017-05-23 PROCEDURE — 82306 VITAMIN D 25 HYDROXY: CPT | Performed by: FAMILY MEDICINE

## 2017-05-23 PROCEDURE — 84443 ASSAY THYROID STIM HORMONE: CPT | Performed by: FAMILY MEDICINE

## 2017-05-23 RX ORDER — ALBUTEROL SULFATE 90 UG/1
2 AEROSOL, METERED RESPIRATORY (INHALATION) EVERY 4 HOURS PRN
Qty: 1 INHALER | Refills: 2 | Status: SHIPPED | OUTPATIENT
Start: 2017-05-23 | End: 2018-07-31 | Stop reason: SDUPTHER

## 2017-05-24 ENCOUNTER — LAB (OUTPATIENT)
Dept: LAB | Facility: HOSPITAL | Age: 77
End: 2017-05-24

## 2017-05-24 ENCOUNTER — OFFICE VISIT (OUTPATIENT)
Dept: ONCOLOGY | Facility: CLINIC | Age: 77
End: 2017-05-24

## 2017-05-24 VITALS
TEMPERATURE: 97.4 F | DIASTOLIC BLOOD PRESSURE: 75 MMHG | BODY MASS INDEX: 26.13 KG/M2 | HEIGHT: 62 IN | SYSTOLIC BLOOD PRESSURE: 190 MMHG | RESPIRATION RATE: 16 BRPM | HEART RATE: 55 BPM | WEIGHT: 142 LBS

## 2017-05-24 DIAGNOSIS — C88.0 WALDENSTROM MACROGLOBULINEMIA (HCC): ICD-10-CM

## 2017-05-24 DIAGNOSIS — C88.0 WALDENSTROM MACROGLOBULINEMIA (HCC): Primary | Chronic | ICD-10-CM

## 2017-05-24 LAB
ALBUMIN SERPL-MCNC: 4 G/DL (ref 3.2–4.8)
ALBUMIN/GLOB SERPL: 2.1 G/DL (ref 1.5–2.5)
ALP SERPL-CCNC: 81 U/L (ref 25–100)
ALT SERPL W P-5'-P-CCNC: 4 U/L (ref 7–40)
ANION GAP SERPL CALCULATED.3IONS-SCNC: 4 MMOL/L (ref 3–11)
AST SERPL-CCNC: 15 U/L (ref 0–33)
BILIRUB SERPL-MCNC: 1 MG/DL (ref 0.3–1.2)
BUN BLD-MCNC: 28 MG/DL (ref 9–23)
BUN/CREAT SERPL: 17.5 (ref 7–25)
CALCIUM SPEC-SCNC: 9.4 MG/DL (ref 8.7–10.4)
CHLORIDE SERPL-SCNC: 100 MMOL/L (ref 99–109)
CO2 SERPL-SCNC: 39 MMOL/L (ref 20–31)
CREAT BLD-MCNC: 1.6 MG/DL (ref 0.6–1.3)
ERYTHROCYTE [DISTWIDTH] IN BLOOD BY AUTOMATED COUNT: 15.2 % (ref 11.3–14.5)
GFR SERPL CREATININE-BSD FRML MDRD: 31 ML/MIN/1.73
GLOBULIN UR ELPH-MCNC: 1.9 GM/DL
GLUCOSE BLD-MCNC: 93 MG/DL (ref 70–100)
HCT VFR BLD AUTO: 39.1 % (ref 34.5–44)
HGB BLD-MCNC: 12.2 G/DL (ref 11.5–15.5)
LYMPHOCYTES # BLD AUTO: 2.2 10*3/MM3 (ref 0.6–4.8)
LYMPHOCYTES NFR BLD AUTO: 35.2 % (ref 24–44)
MCH RBC QN AUTO: 25.6 PG (ref 27–31)
MCHC RBC AUTO-ENTMCNC: 31.2 G/DL (ref 32–36)
MCV RBC AUTO: 82.3 FL (ref 80–99)
MONOCYTES # BLD AUTO: 0.3 10*3/MM3 (ref 0–1)
MONOCYTES NFR BLD AUTO: 5 % (ref 0–12)
NEUTROPHILS # BLD AUTO: 3.7 10*3/MM3 (ref 1.5–8.3)
NEUTROPHILS NFR BLD AUTO: 59.8 % (ref 41–71)
PLATELET # BLD AUTO: 128 10*3/MM3 (ref 150–450)
PMV BLD AUTO: 8.2 FL (ref 6–12)
POTASSIUM BLD-SCNC: 4.2 MMOL/L (ref 3.5–5.5)
PROT SERPL-MCNC: 5.9 G/DL (ref 5.7–8.2)
RBC # BLD AUTO: 4.75 10*6/MM3 (ref 3.89–5.14)
SODIUM BLD-SCNC: 143 MMOL/L (ref 132–146)
WBC NRBC COR # BLD: 6.2 10*3/MM3 (ref 3.5–10.8)

## 2017-05-24 PROCEDURE — 85025 COMPLETE CBC W/AUTO DIFF WBC: CPT

## 2017-05-24 PROCEDURE — 86334 IMMUNOFIX E-PHORESIS SERUM: CPT | Performed by: INTERNAL MEDICINE

## 2017-05-24 PROCEDURE — 80053 COMPREHEN METABOLIC PANEL: CPT | Performed by: INTERNAL MEDICINE

## 2017-05-24 PROCEDURE — 36415 COLL VENOUS BLD VENIPUNCTURE: CPT

## 2017-05-24 PROCEDURE — 84165 PROTEIN E-PHORESIS SERUM: CPT | Performed by: INTERNAL MEDICINE

## 2017-05-24 PROCEDURE — 99213 OFFICE O/P EST LOW 20 MIN: CPT | Performed by: INTERNAL MEDICINE

## 2017-05-24 PROCEDURE — 84155 ASSAY OF PROTEIN SERUM: CPT | Performed by: INTERNAL MEDICINE

## 2017-05-25 LAB
ALBUMIN SERPL-MCNC: 3.6 G/DL (ref 2.9–4.4)
ALBUMIN/GLOB SERPL: 1.7 {RATIO} (ref 0.7–1.7)
ALPHA1 GLOB FLD ELPH-MCNC: 0.2 G/DL (ref 0–0.4)
ALPHA2 GLOB SERPL ELPH-MCNC: 0.7 G/DL (ref 0.4–1)
B-GLOBULIN SERPL ELPH-MCNC: 0.7 G/DL (ref 0.7–1.3)
GAMMA GLOB SERPL ELPH-MCNC: 0.5 G/DL (ref 0.4–1.8)
GLOBULIN SER CALC-MCNC: 2.2 G/DL (ref 2.2–3.9)
IGA SERPL-MCNC: 21 MG/DL (ref 64–422)
IGG SERPL-MCNC: 394 MG/DL (ref 700–1600)
IGM SERPL-MCNC: 279 MG/DL (ref 26–217)
INTERPRETATION SERPL IEP-IMP: ABNORMAL
Lab: ABNORMAL
M-SPIKE: 0.2 G/DL
PROT SERPL-MCNC: 5.8 G/DL (ref 6–8.5)

## 2017-06-26 ENCOUNTER — TELEPHONE (OUTPATIENT)
Dept: ONCOLOGY | Facility: CLINIC | Age: 77
End: 2017-06-26

## 2017-06-26 NOTE — TELEPHONE ENCOUNTER
Spoke with patient. Wanted to touch base that Fady Gamez had been in touch with her. She is the financial counselor working on funding assistance for the imbruvica. We will keep her updated through the process.

## 2017-06-26 NOTE — TELEPHONE ENCOUNTER
This patient received a letter from the [employer] saying they could no longer help her. I called them [phone] and they said they have run out of funding. She has a remaining balance of five [phone] something that will  on .     She did tell me the leukemia and lymphoma society was currently accepting applications. I asked if we could use the $5,000 to buy as much Imbruvica as possible and she said no.  If L&L society couldn't help she could offer more referral possibilities.     Leukemia & Lymphoma society [phone]              I will reach out to our financial consoler for further assistance.

## 2017-06-26 NOTE — TELEPHONE ENCOUNTER
----- Message from Eleanor Owens sent at 6/26/2017  8:11 AM EDT -----  Regarding: [employer] ASSISTANCE FOR MEDICATION  Contact: [phone]  Patient called she wants Mary to call her about [facility] they are saying they can't help her anymore so she needs some help with this.

## 2017-07-12 ENCOUNTER — LAB (OUTPATIENT)
Dept: LAB | Facility: HOSPITAL | Age: 77
End: 2017-07-12

## 2017-07-12 ENCOUNTER — OFFICE VISIT (OUTPATIENT)
Dept: ONCOLOGY | Facility: CLINIC | Age: 77
End: 2017-07-12

## 2017-07-12 VITALS
BODY MASS INDEX: 26.13 KG/M2 | RESPIRATION RATE: 16 BRPM | DIASTOLIC BLOOD PRESSURE: 74 MMHG | SYSTOLIC BLOOD PRESSURE: 183 MMHG | HEIGHT: 62 IN | TEMPERATURE: 97.2 F | HEART RATE: 54 BPM | WEIGHT: 142 LBS

## 2017-07-12 DIAGNOSIS — R80.9 PROTEINURIA: ICD-10-CM

## 2017-07-12 DIAGNOSIS — C88.0 WALDENSTROM MACROGLOBULINEMIA (HCC): Primary | Chronic | ICD-10-CM

## 2017-07-12 DIAGNOSIS — C88.0 WALDENSTROM MACROGLOBULINEMIA (HCC): ICD-10-CM

## 2017-07-12 DIAGNOSIS — N18.30 CHRONIC KIDNEY DISEASE, STAGE III (MODERATE) (HCC): Primary | ICD-10-CM

## 2017-07-12 LAB
25(OH)D3 SERPL-MCNC: 36.6 NG/ML
ALBUMIN SERPL-MCNC: 4.1 G/DL (ref 3.2–4.8)
ALBUMIN/GLOB SERPL: 2.1 G/DL (ref 1.5–2.5)
ALP SERPL-CCNC: 97 U/L (ref 25–100)
ALT SERPL W P-5'-P-CCNC: 8 U/L (ref 7–40)
ANION GAP SERPL CALCULATED.3IONS-SCNC: 5 MMOL/L (ref 3–11)
AST SERPL-CCNC: 19 U/L (ref 0–33)
BACTERIA UR QL AUTO: ABNORMAL /HPF
BILIRUB SERPL-MCNC: 1.3 MG/DL (ref 0.3–1.2)
BILIRUB UR QL STRIP: NEGATIVE
BUN BLD-MCNC: 25 MG/DL (ref 9–23)
BUN/CREAT SERPL: 15.6 (ref 7–25)
CALCIUM SPEC-SCNC: 9.3 MG/DL (ref 8.7–10.4)
CHLORIDE SERPL-SCNC: 100 MMOL/L (ref 99–109)
CLARITY UR: CLEAR
CO2 SERPL-SCNC: 35 MMOL/L (ref 20–31)
COLOR UR: YELLOW
CREAT BLD-MCNC: 1.6 MG/DL (ref 0.6–1.3)
CREAT UR-MCNC: 77.2 MG/DL
ERYTHROCYTE [DISTWIDTH] IN BLOOD BY AUTOMATED COUNT: 14.3 % (ref 11.3–14.5)
GFR SERPL CREATININE-BSD FRML MDRD: 31 ML/MIN/1.73
GLOBULIN UR ELPH-MCNC: 2 GM/DL
GLUCOSE BLD-MCNC: 85 MG/DL (ref 70–100)
GLUCOSE UR STRIP-MCNC: NEGATIVE MG/DL
HCT VFR BLD AUTO: 39.1 % (ref 34.5–44)
HGB BLD-MCNC: 12.6 G/DL (ref 11.5–15.5)
HGB UR QL STRIP.AUTO: NEGATIVE
HYALINE CASTS UR QL AUTO: ABNORMAL /LPF
KETONES UR QL STRIP: NEGATIVE
LEUKOCYTE ESTERASE UR QL STRIP.AUTO: NEGATIVE
LYMPHOCYTES # BLD AUTO: 1.8 10*3/MM3 (ref 0.6–4.8)
LYMPHOCYTES NFR BLD AUTO: 31.4 % (ref 24–44)
MCH RBC QN AUTO: 26.6 PG (ref 27–31)
MCHC RBC AUTO-ENTMCNC: 32.2 G/DL (ref 32–36)
MCV RBC AUTO: 82.7 FL (ref 80–99)
MONOCYTES # BLD AUTO: 0.2 10*3/MM3 (ref 0–1)
MONOCYTES NFR BLD AUTO: 4 % (ref 0–12)
NEUTROPHILS # BLD AUTO: 3.7 10*3/MM3 (ref 1.5–8.3)
NEUTROPHILS NFR BLD AUTO: 64.6 % (ref 41–71)
NITRITE UR QL STRIP: NEGATIVE
PH UR STRIP.AUTO: 8 [PH] (ref 5–8)
PHOSPHATE SERPL-MCNC: 4.4 MG/DL (ref 2.4–5.1)
PLATELET # BLD AUTO: 121 10*3/MM3 (ref 150–450)
PMV BLD AUTO: 9 FL (ref 6–12)
POTASSIUM BLD-SCNC: 3.9 MMOL/L (ref 3.5–5.5)
PROT SERPL-MCNC: 6.1 G/DL (ref 5.7–8.2)
PROT UR QL STRIP: ABNORMAL
PROT UR-MCNC: 19 MG/DL (ref 1–14)
RBC # BLD AUTO: 4.73 10*6/MM3 (ref 3.89–5.14)
RBC # UR: ABNORMAL /HPF
REF LAB TEST METHOD: ABNORMAL
SODIUM BLD-SCNC: 140 MMOL/L (ref 132–146)
SP GR UR STRIP: 1 (ref 1–1.03)
SQUAMOUS #/AREA URNS HPF: ABNORMAL /HPF
URATE SERPL-MCNC: 8 MG/DL (ref 3.1–7.8)
UROBILINOGEN UR QL STRIP: ABNORMAL
WBC NRBC COR # BLD: 5.8 10*3/MM3 (ref 3.5–10.8)
WBC UR QL AUTO: ABNORMAL /HPF

## 2017-07-12 PROCEDURE — 84155 ASSAY OF PROTEIN SERUM: CPT | Performed by: INTERNAL MEDICINE

## 2017-07-12 PROCEDURE — 84100 ASSAY OF PHOSPHORUS: CPT | Performed by: INTERNAL MEDICINE

## 2017-07-12 PROCEDURE — 84550 ASSAY OF BLOOD/URIC ACID: CPT | Performed by: INTERNAL MEDICINE

## 2017-07-12 PROCEDURE — 85025 COMPLETE CBC W/AUTO DIFF WBC: CPT

## 2017-07-12 PROCEDURE — 82570 ASSAY OF URINE CREATININE: CPT | Performed by: INTERNAL MEDICINE

## 2017-07-12 PROCEDURE — 81001 URINALYSIS AUTO W/SCOPE: CPT | Performed by: INTERNAL MEDICINE

## 2017-07-12 PROCEDURE — 80053 COMPREHEN METABOLIC PANEL: CPT | Performed by: INTERNAL MEDICINE

## 2017-07-12 PROCEDURE — 99214 OFFICE O/P EST MOD 30 MIN: CPT | Performed by: INTERNAL MEDICINE

## 2017-07-12 PROCEDURE — 82306 VITAMIN D 25 HYDROXY: CPT | Performed by: INTERNAL MEDICINE

## 2017-07-12 PROCEDURE — 84165 PROTEIN E-PHORESIS SERUM: CPT | Performed by: INTERNAL MEDICINE

## 2017-07-12 PROCEDURE — 84156 ASSAY OF PROTEIN URINE: CPT | Performed by: INTERNAL MEDICINE

## 2017-07-12 PROCEDURE — 36415 COLL VENOUS BLD VENIPUNCTURE: CPT

## 2017-07-12 PROCEDURE — 86334 IMMUNOFIX E-PHORESIS SERUM: CPT | Performed by: INTERNAL MEDICINE

## 2017-07-12 NOTE — PROGRESS NOTES
"  PROBLEM LIST:  1. Waldenstrom's macroglobulinemia:  a) Splenomegaly was noted on a CT of the chest on 06/06/2015 done for weight  loss, decreased appetite and left upper quadrant pain.   b) Lab evaluation on 06/16/2015 showed an anemia with hemoglobin 10.2,  thrombocytopenia with platelets 116, a 0.6 g/dL IgM lambda monoclonal protein  was identified on SPEP, beta-2 microglobulin was 10.4, . 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   c) Treatment with Ibrutinib was started 07/2015.   2. Hypertension.   3. Essential tremor.   4. Scoliosis.     Subjective     HISTORY OF PRESENT ILLNESS:   Ms. Yusuf returns for two-month follow-up.  She says that since her last visit she had 3 weeks for she had a really good energy level.  She was able to do a lot of work around her house including freezing some of her garden vegetables, waxing her car, and doing work in the yard.  Her energy now has decreased somewhat.  She reports that she is thinking about eye surgery as well as dental work and wants to know what to do with her Imbruvica around that time.  Her blood pressure at home has been in the 120s to 130s systolic.    Past Medical History, Past Surgical History, Social History, Family History have been reviewed and are without significant changes except as mentioned.    Review of Systems   A comprehensive 14 point review of systems was performed and was negative except as mentioned.    Medications:  The current medication list was reviewed in the EMR    ALLERGIES:    Allergies   Allergen Reactions   • Bactrim [Sulfamethoxazole-Trimethoprim]    • Sulfa Antibiotics      Sulfa Drugs   • Vancomycin    • Zosyn [Piperacillin Sod-Tazobactam So]        Objective      BP (!) 183/74 Comment: LUE  Pulse 54  Temp 97.2 °F (36.2 °C) (Temporal Artery )   Resp 16  Ht 62\" (157.5 cm)  Wt 142 lb (64.4 " kg)  BMI 25.97 kg/m2     Performance Status: 1    General: well appearing female in no acute distress  Neuro: alert and oriented  HEENT: sclera anicteric, oropharynx clear  Lymphatics: no cervical, supraclavicular adenopathy  Cardiovascular: regular rate and rhythm, no murmurs  Lungs: clear to auscultation bilaterally  Abdomen: soft, nontender, nondistended.  No palpable organomegaly  Extremeties: 2+ ankle edema bilaterally  Skin: brusing on right lower shin  Psych: mood and affect appropriate      RECENT LABS:  IgM 279 on 5/24/2017.  M-spike 0.2 g/dl                    Assessment/Plan   Carin Yusuf is a 76 y.o. female with Waldenström's macroglobulinemia who continues on imbruvica.  She continues to do well with Imbruvica. She is doing well without any evidence of disease progression.  She has mild stable thrombocytopenia.    - hypertension - white coat hypertension.  Blood pressure at home appears to be adequate    - Her funding to help cover the cost of Imbruvica has run out.  We're currently helping her investigate other sources of patient assistance.    If she does have dental work or eye surgery, I recommended that she stop her Imbruvica 4 days prior to the procedure and wait until 4 days afterwards to resume it.    I will see her back in 2months.                        Visit time was 25 minutes, greater than 50% spent in counseling      Kasey Alvarez MD  [facility] On[facility]    7/12/2017          CC:

## 2017-07-13 LAB
ALBUMIN SERPL-MCNC: 3.6 G/DL (ref 2.9–4.4)
ALBUMIN/GLOB SERPL: 1.7 {RATIO} (ref 0.7–1.7)
ALPHA1 GLOB FLD ELPH-MCNC: 0.2 G/DL (ref 0–0.4)
ALPHA2 GLOB SERPL ELPH-MCNC: 0.7 G/DL (ref 0.4–1)
B-GLOBULIN SERPL ELPH-MCNC: 0.7 G/DL (ref 0.7–1.3)
GAMMA GLOB SERPL ELPH-MCNC: 0.6 G/DL (ref 0.4–1.8)
GLOBULIN SER CALC-MCNC: 2.2 G/DL (ref 2.2–3.9)
IGA SERPL-MCNC: 18 MG/DL (ref 64–422)
IGG SERPL-MCNC: 396 MG/DL (ref 700–1600)
IGM SERPL-MCNC: 289 MG/DL (ref 26–217)
INTERPRETATION SERPL IEP-IMP: ABNORMAL
Lab: ABNORMAL
M-SPIKE: 0.1 G/DL
PROT SERPL-MCNC: 5.8 G/DL (ref 6–8.5)

## 2017-08-01 RX ORDER — ATENOLOL 100 MG/1
TABLET ORAL
Qty: 90 TABLET | Refills: 0 | Status: SHIPPED | OUTPATIENT
Start: 2017-08-01 | End: 2017-10-30 | Stop reason: SDUPTHER

## 2017-08-23 RX ORDER — AMLODIPINE BESYLATE 5 MG/1
TABLET ORAL
Qty: 90 TABLET | Refills: 0 | Status: SHIPPED | OUTPATIENT
Start: 2017-08-23 | End: 2017-09-29 | Stop reason: SINTOL

## 2017-09-13 ENCOUNTER — LAB (OUTPATIENT)
Dept: LAB | Facility: HOSPITAL | Age: 77
End: 2017-09-13

## 2017-09-13 ENCOUNTER — OFFICE VISIT (OUTPATIENT)
Dept: ONCOLOGY | Facility: CLINIC | Age: 77
End: 2017-09-13

## 2017-09-13 VITALS
HEART RATE: 55 BPM | DIASTOLIC BLOOD PRESSURE: 81 MMHG | SYSTOLIC BLOOD PRESSURE: 211 MMHG | TEMPERATURE: 98.6 F | BODY MASS INDEX: 26.87 KG/M2 | WEIGHT: 146 LBS | HEIGHT: 62 IN | RESPIRATION RATE: 16 BRPM

## 2017-09-13 DIAGNOSIS — C88.0 WALDENSTROM MACROGLOBULINEMIA (HCC): ICD-10-CM

## 2017-09-13 DIAGNOSIS — C88.0 WALDENSTROM MACROGLOBULINEMIA (HCC): Primary | Chronic | ICD-10-CM

## 2017-09-13 LAB
ALBUMIN SERPL-MCNC: 4.1 G/DL (ref 3.2–4.8)
ALBUMIN/GLOB SERPL: 2.3 G/DL (ref 1.5–2.5)
ALP SERPL-CCNC: 112 U/L (ref 25–100)
ALT SERPL W P-5'-P-CCNC: 8 U/L (ref 7–40)
ANION GAP SERPL CALCULATED.3IONS-SCNC: 6 MMOL/L (ref 3–11)
AST SERPL-CCNC: 19 U/L (ref 0–33)
BILIRUB SERPL-MCNC: 1.2 MG/DL (ref 0.3–1.2)
BUN BLD-MCNC: 25 MG/DL (ref 9–23)
BUN/CREAT SERPL: 16.7 (ref 7–25)
CALCIUM SPEC-SCNC: 9.4 MG/DL (ref 8.7–10.4)
CHLORIDE SERPL-SCNC: 99 MMOL/L (ref 99–109)
CO2 SERPL-SCNC: 35 MMOL/L (ref 20–31)
CREAT BLD-MCNC: 1.5 MG/DL (ref 0.6–1.3)
ERYTHROCYTE [DISTWIDTH] IN BLOOD BY AUTOMATED COUNT: 14.4 % (ref 11.3–14.5)
GFR SERPL CREATININE-BSD FRML MDRD: 33 ML/MIN/1.73
GLOBULIN UR ELPH-MCNC: 1.8 GM/DL
GLUCOSE BLD-MCNC: 96 MG/DL (ref 70–100)
HCT VFR BLD AUTO: 41.3 % (ref 34.5–44)
HGB BLD-MCNC: 13.2 G/DL (ref 11.5–15.5)
LYMPHOCYTES # BLD AUTO: 2.3 10*3/MM3 (ref 0.6–4.8)
LYMPHOCYTES NFR BLD AUTO: 34 % (ref 24–44)
MCH RBC QN AUTO: 26.4 PG (ref 27–31)
MCHC RBC AUTO-ENTMCNC: 31.9 G/DL (ref 32–36)
MCV RBC AUTO: 82.5 FL (ref 80–99)
MONOCYTES # BLD AUTO: 0.3 10*3/MM3 (ref 0–1)
MONOCYTES NFR BLD AUTO: 4.2 % (ref 0–12)
NEUTROPHILS # BLD AUTO: 4.2 10*3/MM3 (ref 1.5–8.3)
NEUTROPHILS NFR BLD AUTO: 61.8 % (ref 41–71)
PLATELET # BLD AUTO: 118 10*3/MM3 (ref 150–450)
PMV BLD AUTO: 8.4 FL (ref 6–12)
POTASSIUM BLD-SCNC: 3.9 MMOL/L (ref 3.5–5.5)
PROT SERPL-MCNC: 5.9 G/DL (ref 5.7–8.2)
RBC # BLD AUTO: 5 10*6/MM3 (ref 3.89–5.14)
SODIUM BLD-SCNC: 140 MMOL/L (ref 132–146)
WBC NRBC COR # BLD: 6.8 10*3/MM3 (ref 3.5–10.8)

## 2017-09-13 PROCEDURE — 36415 COLL VENOUS BLD VENIPUNCTURE: CPT

## 2017-09-13 PROCEDURE — 84165 PROTEIN E-PHORESIS SERUM: CPT | Performed by: INTERNAL MEDICINE

## 2017-09-13 PROCEDURE — 84155 ASSAY OF PROTEIN SERUM: CPT | Performed by: INTERNAL MEDICINE

## 2017-09-13 PROCEDURE — 99213 OFFICE O/P EST LOW 20 MIN: CPT | Performed by: INTERNAL MEDICINE

## 2017-09-13 PROCEDURE — 85025 COMPLETE CBC W/AUTO DIFF WBC: CPT

## 2017-09-13 PROCEDURE — 80053 COMPREHEN METABOLIC PANEL: CPT | Performed by: INTERNAL MEDICINE

## 2017-09-13 PROCEDURE — 86334 IMMUNOFIX E-PHORESIS SERUM: CPT | Performed by: INTERNAL MEDICINE

## 2017-09-13 NOTE — PROGRESS NOTES
"  PROBLEM LIST:  1. Waldenstrom's macroglobulinemia:  a) Splenomegaly was noted on a CT of the chest on 06/06/2015 done for weight  loss, decreased appetite and left upper quadrant pain.   b) Lab evaluation on 06/16/2015 showed an anemia with hemoglobin 10.2,  thrombocytopenia with platelets 116, a 0.6 g/dL IgM lambda monoclonal protein  was identified on SPEP, beta-2 microglobulin was 10.4, . 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   c) Treatment with Ibrutinib was started 07/2015.   2. Hypertension.   3. Essential tremor.   4. Scoliosis.     Subjective     HISTORY OF PRESENT ILLNESS:   Ms. Yusuf returns for two-month follow-up.  She has been feeling pretty well.  She notes some occasional \"burning\" in her stomach but has not tried taking anything for it.  She also has some skin lesions that her doctor prescribed  Cortisone cream for.  Her blood pressures at home have been 120s-130s systolic.  She denies nausea or diarrhea.  She does have increased gas and belching.    Past Medical History, Past Surgical History, Social History, Family History have been reviewed and are without significant changes except as mentioned.    Review of Systems   A comprehensive 14 point review of systems was performed and was negative except as mentioned.    Medications:  The current medication list was reviewed in the EMR    ALLERGIES:    Allergies   Allergen Reactions   • Bactrim [Sulfamethoxazole-Trimethoprim]    • Sulfa Antibiotics      Sulfa Drugs   • Vancomycin    • Zosyn [Piperacillin Sod-Tazobactam So]        Objective      Ht 62\" (157.5 cm)     Performance Status: 1    General: well appearing female in no acute distress  Neuro: alert and oriented  HEENT: sclera anicteric, oropharynx clear  Lymphatics: no cervical, supraclavicular, or axillary adenopathy  Cardiovascular: regular rate and " rhythm, no murmurs  Lungs: clear to auscultation bilaterally  Abdomen: soft, nontender, nondistended.  No palpable organomegaly  Extremeties: trace ankle edema bilaterally  Skin: few scattered dry patches on face and forearms  Psych: mood and affect appropriate      RECENT LABS:  IgM on 289, M spike 0.1 g/dl on 7/12/17    CBC today shows WBC 6.8, hgb 13.2, plt 118.                  Assessment/Plan   Carin Yusuf is a 77 y.o. female with Waldenström's macroglobulinemia who continues on imbruvica.  She has a stable M protein and no evidence of disease progression.  She is tolerating imbruvica reasonably well.      We discussed trying Tums or another OTC antacid for her stomach.  She also can try simethicone for gas and belching.  These may be side effects from imbruvica.      I will see her back in 2 months.                        Visit time was 15 minutes, greater than 50% spent in counseling      Kasey Alvarez MD  [facility] Hematology and On[facility]    9/13/2017          CC:

## 2017-09-14 LAB
ALBUMIN SERPL-MCNC: 3.6 G/DL (ref 2.9–4.4)
ALBUMIN/GLOB SERPL: 1.9 {RATIO} (ref 0.7–1.7)
ALPHA1 GLOB FLD ELPH-MCNC: 0.3 G/DL (ref 0–0.4)
ALPHA2 GLOB SERPL ELPH-MCNC: 0.7 G/DL (ref 0.4–1)
B-GLOBULIN SERPL ELPH-MCNC: 0.8 G/DL (ref 0.7–1.3)
GAMMA GLOB SERPL ELPH-MCNC: 0.3 G/DL (ref 0.4–1.8)
GLOBULIN SER CALC-MCNC: 2 G/DL (ref 2.2–3.9)
IGA SERPL-MCNC: 18 MG/DL (ref 64–422)
IGG SERPL-MCNC: 387 MG/DL (ref 700–1600)
IGM SERPL-MCNC: 150 MG/DL (ref 26–217)
INTERPRETATION SERPL IEP-IMP: ABNORMAL
Lab: ABNORMAL
M-SPIKE: 0.1 G/DL
PROT SERPL-MCNC: 5.6 G/DL (ref 6–8.5)

## 2017-09-27 ENCOUNTER — OFFICE VISIT (OUTPATIENT)
Dept: FAMILY MEDICINE CLINIC | Facility: CLINIC | Age: 77
End: 2017-09-27

## 2017-09-27 VITALS
HEIGHT: 62 IN | OXYGEN SATURATION: 97 % | RESPIRATION RATE: 16 BRPM | BODY MASS INDEX: 26.35 KG/M2 | WEIGHT: 143.2 LBS | DIASTOLIC BLOOD PRESSURE: 72 MMHG | SYSTOLIC BLOOD PRESSURE: 138 MMHG | HEART RATE: 59 BPM

## 2017-09-27 DIAGNOSIS — I10 ESSENTIAL HYPERTENSION: Primary | Chronic | ICD-10-CM

## 2017-09-27 DIAGNOSIS — C88.0 WALDENSTROM MACROGLOBULINEMIA (HCC): Chronic | ICD-10-CM

## 2017-09-27 PROCEDURE — 99213 OFFICE O/P EST LOW 20 MIN: CPT | Performed by: FAMILY MEDICINE

## 2017-09-27 PROCEDURE — 90662 IIV NO PRSV INCREASED AG IM: CPT | Performed by: FAMILY MEDICINE

## 2017-09-27 PROCEDURE — G0008 ADMIN INFLUENZA VIRUS VAC: HCPCS | Performed by: FAMILY MEDICINE

## 2017-09-27 RX ORDER — LOSARTAN POTASSIUM 100 MG/1
100 TABLET ORAL DAILY
Qty: 90 TABLET | Refills: 1 | Status: SHIPPED | OUTPATIENT
Start: 2017-09-27 | End: 2017-12-18 | Stop reason: SDUPTHER

## 2017-09-27 NOTE — PROGRESS NOTES
"Subjective   Carin Yusuf is a 77 y.o. female.     Hypertension   This is a chronic problem. The problem is unchanged. The problem is controlled. Associated symptoms include malaise/fatigue. Pertinent negatives include no chest pain, headaches, palpitations, peripheral edema or shortness of breath. Risk factors for coronary artery disease include post-menopausal state and sedentary lifestyle. Past treatments include calcium channel blockers, beta blockers, angiotensin blockers and diuretics. The current treatment provides significant improvement. There are no compliance problems.  There is no history of angina, kidney disease or CAD/MI.        The following portions of the patient's history were reviewed and updated as appropriate: allergies, current medications, past social history and problem list.    Review of Systems   Constitutional: Positive for malaise/fatigue. Negative for fatigue and unexpected weight change.   HENT: Negative for congestion and postnasal drip.    Respiratory: Negative for cough, chest tightness and shortness of breath.    Cardiovascular: Negative for chest pain, palpitations and leg swelling.   Gastrointestinal: Negative for nausea.   Musculoskeletal: Positive for back pain.   Skin: Negative for color change and rash.   Neurological: Negative for dizziness, syncope, weakness and headaches.   Hematological: Negative for adenopathy.   Psychiatric/Behavioral: Negative.        Objective   /72  Pulse 59  Resp 16  Ht 62\" (157.5 cm)  Wt 143 lb 3.2 oz (65 kg)  SpO2 97%  BMI 26.19 kg/m2  Physical Exam   Constitutional: She is oriented to person, place, and time. She appears well-developed and well-nourished. She is cooperative.   HENT:   Head: Normocephalic.   Right Ear: External ear normal.   Left Ear: External ear normal.   Nose: Nose normal.   Mouth/Throat: Oropharynx is clear and moist.   Eyes: Conjunctivae are normal. Pupils are equal, round, and reactive to light. No scleral " icterus.   Neck: Neck supple. Carotid bruit is not present. No thyromegaly present.   Cardiovascular: Normal rate, regular rhythm and normal heart sounds.    Pulmonary/Chest: Effort normal and breath sounds normal.   Abdominal: There is no hepatosplenomegaly.   Musculoskeletal: She exhibits no edema.   Significant deformity of the thoracic spine with significant scoliosis   Neurological: She is alert and oriented to person, place, and time.   No focal deficits no lateralizing signs   Skin: Skin is warm and dry. No rash noted.   Psychiatric: She has a normal mood and affect. Cognition and memory are normal.   Nursing note and vitals reviewed.      Assessment/Plan   Problem List Items Addressed This Visit        Cardiovascular and Mediastinum    Hypertension - Primary (Chronic)    Relevant Medications    losartan (COZAAR) 100 MG tablet       Immune and Lymphatic    Waldenstrom macroglobulinemia (Chronic)          New Medications Ordered This Visit   Medications   • losartan (COZAAR) 100 MG tablet     Sig: Take 1 tablet by mouth Daily.     Dispense:  90 tablet     Refill:  1     Continue oncology follow-up visits turn to clinic if symptoms persist or worsen otherwise keep regular follow-up visit, pressure control is significantly improved.

## 2017-09-29 ENCOUNTER — TELEPHONE (OUTPATIENT)
Dept: FAMILY MEDICINE CLINIC | Facility: CLINIC | Age: 77
End: 2017-09-29

## 2017-09-29 RX ORDER — NIFEDIPINE 30 MG/1
30 TABLET, EXTENDED RELEASE ORAL DAILY
Qty: 30 TABLET | Refills: 1 | Status: SHIPPED | OUTPATIENT
Start: 2017-09-29 | End: 2017-11-22 | Stop reason: SDUPTHER

## 2017-09-29 NOTE — TELEPHONE ENCOUNTER
Called informed pt of new Rx and message.  She verbalized understanding.  EUGENE Tejada     ----- Message from Hilario Vasques MD sent at 9/29/2017  1:15 PM EDT -----  Regarding: RE: RX  Contact: [phone]  New rx sent will need to monitor bp on new med  ----- Message -----     From: Ranjeet Tejada MA     Sent: 9/29/2017  12:41 PM       To: Hilario Vasques MD  Subject: FW: RX                                            She said that the Amlodipine isn't coated so it melts fast in her mouth and burns her throat.  The Nisedical is coated and the pharmacist believes that the insurance will cover it.    ----- Message -----     From: Hilario Vasques MD     Sent: 9/28/2017   1:21 PM       To: Ranjeet Tejada MA  Subject: RE: RX                                           Why is it cheaper?  ----- Message -----     From: Ranjeet Tejada MA     Sent: 9/28/2017  12:10 PM       To: Hilario Vasques MD  Subject: FW: RX                                               ----- Message -----     From: Omayra Courtney     Sent: 9/28/2017  10:07 AM       To: Ranjeet Tejada MA  Subject: RX                                               PT WOULD LIKE TO SPEAK TO NURSE ABOUT amLODIPine (NORVASC) 5 MG tablet.THE PHARMACIST SUGGESTED THAT PT TAKE  NISEDICAL 30MG. IS THIS OK?

## 2017-10-09 ENCOUNTER — LAB (OUTPATIENT)
Dept: LAB | Facility: HOSPITAL | Age: 77
End: 2017-10-09

## 2017-10-09 ENCOUNTER — TRANSCRIBE ORDERS (OUTPATIENT)
Dept: LAB | Facility: HOSPITAL | Age: 77
End: 2017-10-09

## 2017-10-09 DIAGNOSIS — N18.30 CHRONIC KIDNEY DISEASE, STAGE III (MODERATE) (HCC): Primary | ICD-10-CM

## 2017-10-09 DIAGNOSIS — N18.30 CHRONIC KIDNEY DISEASE, STAGE III (MODERATE) (HCC): ICD-10-CM

## 2017-10-09 LAB
ALBUMIN SERPL-MCNC: 3.9 G/DL (ref 3.2–4.8)
ANION GAP SERPL CALCULATED.3IONS-SCNC: 9 MMOL/L (ref 3–11)
BACTERIA UR QL AUTO: ABNORMAL /HPF
BASOPHILS # BLD AUTO: 0.03 10*3/MM3 (ref 0–0.2)
BASOPHILS NFR BLD AUTO: 0.5 % (ref 0–1)
BILIRUB UR QL STRIP: NEGATIVE
BUN BLD-MCNC: 22 MG/DL (ref 9–23)
BUN/CREAT SERPL: 13.8 (ref 7–25)
CALCIUM SPEC-SCNC: 8.7 MG/DL (ref 8.7–10.4)
CHLORIDE SERPL-SCNC: 102 MMOL/L (ref 99–109)
CLARITY UR: CLEAR
CO2 SERPL-SCNC: 29 MMOL/L (ref 20–31)
COLOR UR: YELLOW
CREAT BLD-MCNC: 1.6 MG/DL (ref 0.6–1.3)
CREAT UR-MCNC: 155.9 MG/DL
DEPRECATED RDW RBC AUTO: 41.3 FL (ref 37–54)
EOSINOPHIL # BLD AUTO: 0.09 10*3/MM3 (ref 0–0.3)
EOSINOPHIL NFR BLD AUTO: 1.4 % (ref 0–3)
ERYTHROCYTE [DISTWIDTH] IN BLOOD BY AUTOMATED COUNT: 13.6 % (ref 11.3–14.5)
GFR SERPL CREATININE-BSD FRML MDRD: 31 ML/MIN/1.73
GLUCOSE BLD-MCNC: 148 MG/DL (ref 70–100)
GLUCOSE UR STRIP-MCNC: NEGATIVE MG/DL
HCT VFR BLD AUTO: 37.8 % (ref 34.5–44)
HGB BLD-MCNC: 12 G/DL (ref 11.5–15.5)
HGB UR QL STRIP.AUTO: NEGATIVE
HYALINE CASTS UR QL AUTO: ABNORMAL /LPF
IMM GRANULOCYTES # BLD: 0.01 10*3/MM3 (ref 0–0.03)
IMM GRANULOCYTES NFR BLD: 0.2 % (ref 0–0.6)
KETONES UR QL STRIP: NEGATIVE
LEUKOCYTE ESTERASE UR QL STRIP.AUTO: ABNORMAL
LYMPHOCYTES # BLD AUTO: 1.9 10*3/MM3 (ref 0.6–4.8)
LYMPHOCYTES NFR BLD AUTO: 28.8 % (ref 24–44)
MCH RBC QN AUTO: 26.7 PG (ref 27–31)
MCHC RBC AUTO-ENTMCNC: 31.7 G/DL (ref 32–36)
MCV RBC AUTO: 84.2 FL (ref 80–99)
MONOCYTES # BLD AUTO: 0.4 10*3/MM3 (ref 0–1)
MONOCYTES NFR BLD AUTO: 6.1 % (ref 0–12)
NEUTROPHILS # BLD AUTO: 4.16 10*3/MM3 (ref 1.5–8.3)
NEUTROPHILS NFR BLD AUTO: 63 % (ref 41–71)
NITRITE UR QL STRIP: NEGATIVE
PH UR STRIP.AUTO: 6.5 [PH] (ref 5–8)
PHOSPHATE SERPL-MCNC: 3.6 MG/DL (ref 2.4–5.1)
PLATELET # BLD AUTO: 129 10*3/MM3 (ref 150–450)
PMV BLD AUTO: 11.7 FL (ref 6–12)
POTASSIUM BLD-SCNC: 4.2 MMOL/L (ref 3.5–5.5)
PROT UR QL STRIP: ABNORMAL
PROT UR-MCNC: 35 MG/DL (ref 1–14)
RBC # BLD AUTO: 4.49 10*6/MM3 (ref 3.89–5.14)
RBC # UR: ABNORMAL /HPF
REF LAB TEST METHOD: ABNORMAL
SODIUM BLD-SCNC: 140 MMOL/L (ref 132–146)
SP GR UR STRIP: 1.01 (ref 1–1.03)
SQUAMOUS #/AREA URNS HPF: ABNORMAL /HPF
URATE SERPL-MCNC: 9.5 MG/DL (ref 3.1–7.8)
UROBILINOGEN UR QL STRIP: ABNORMAL
WBC NRBC COR # BLD: 6.59 10*3/MM3 (ref 3.5–10.8)
WBC UR QL AUTO: ABNORMAL /HPF

## 2017-10-09 PROCEDURE — 84550 ASSAY OF BLOOD/URIC ACID: CPT | Performed by: INTERNAL MEDICINE

## 2017-10-09 PROCEDURE — 36415 COLL VENOUS BLD VENIPUNCTURE: CPT

## 2017-10-09 PROCEDURE — 81001 URINALYSIS AUTO W/SCOPE: CPT | Performed by: INTERNAL MEDICINE

## 2017-10-09 PROCEDURE — 84156 ASSAY OF PROTEIN URINE: CPT | Performed by: INTERNAL MEDICINE

## 2017-10-09 PROCEDURE — 85025 COMPLETE CBC W/AUTO DIFF WBC: CPT | Performed by: INTERNAL MEDICINE

## 2017-10-09 PROCEDURE — 80069 RENAL FUNCTION PANEL: CPT | Performed by: INTERNAL MEDICINE

## 2017-10-09 PROCEDURE — 82570 ASSAY OF URINE CREATININE: CPT | Performed by: INTERNAL MEDICINE

## 2017-10-30 RX ORDER — ATENOLOL 100 MG/1
TABLET ORAL
Qty: 90 TABLET | Refills: 0 | Status: SHIPPED | OUTPATIENT
Start: 2017-10-30 | End: 2018-02-02 | Stop reason: SDUPTHER

## 2017-11-08 ENCOUNTER — OFFICE VISIT (OUTPATIENT)
Dept: ONCOLOGY | Facility: CLINIC | Age: 77
End: 2017-11-08

## 2017-11-08 ENCOUNTER — LAB (OUTPATIENT)
Dept: LAB | Facility: HOSPITAL | Age: 77
End: 2017-11-08

## 2017-11-08 VITALS
HEART RATE: 55 BPM | HEIGHT: 62 IN | RESPIRATION RATE: 16 BRPM | WEIGHT: 146 LBS | SYSTOLIC BLOOD PRESSURE: 177 MMHG | DIASTOLIC BLOOD PRESSURE: 92 MMHG | BODY MASS INDEX: 26.87 KG/M2 | TEMPERATURE: 97.3 F

## 2017-11-08 DIAGNOSIS — C88.0 WALDENSTROM MACROGLOBULINEMIA (HCC): ICD-10-CM

## 2017-11-08 LAB
ALBUMIN SERPL-MCNC: 4.3 G/DL (ref 3.2–4.8)
ALBUMIN/GLOB SERPL: 2.3 G/DL (ref 1.5–2.5)
ALP SERPL-CCNC: 111 U/L (ref 25–100)
ALT SERPL W P-5'-P-CCNC: 4 U/L (ref 7–40)
ANION GAP SERPL CALCULATED.3IONS-SCNC: 6 MMOL/L (ref 3–11)
AST SERPL-CCNC: 17 U/L (ref 0–33)
BILIRUB SERPL-MCNC: 1.3 MG/DL (ref 0.3–1.2)
BUN BLD-MCNC: 27 MG/DL (ref 9–23)
BUN/CREAT SERPL: 15 (ref 7–25)
CALCIUM SPEC-SCNC: 9.5 MG/DL (ref 8.7–10.4)
CHLORIDE SERPL-SCNC: 102 MMOL/L (ref 99–109)
CO2 SERPL-SCNC: 32 MMOL/L (ref 20–31)
CREAT BLD-MCNC: 1.8 MG/DL (ref 0.6–1.3)
ERYTHROCYTE [DISTWIDTH] IN BLOOD BY AUTOMATED COUNT: 14.9 % (ref 11.3–14.5)
GFR SERPL CREATININE-BSD FRML MDRD: 27 ML/MIN/1.73
GLOBULIN UR ELPH-MCNC: 1.9 GM/DL
GLUCOSE BLD-MCNC: 92 MG/DL (ref 70–100)
HCT VFR BLD AUTO: 41 % (ref 34.5–44)
HGB BLD-MCNC: 13.1 G/DL (ref 11.5–15.5)
LYMPHOCYTES # BLD AUTO: 2.1 10*3/MM3 (ref 0.6–4.8)
LYMPHOCYTES NFR BLD AUTO: 33.5 % (ref 24–44)
MCH RBC QN AUTO: 26.3 PG (ref 27–31)
MCHC RBC AUTO-ENTMCNC: 31.9 G/DL (ref 32–36)
MCV RBC AUTO: 82.5 FL (ref 80–99)
MONOCYTES # BLD AUTO: 0.2 10*3/MM3 (ref 0–1)
MONOCYTES NFR BLD AUTO: 2.9 % (ref 0–12)
NEUTROPHILS # BLD AUTO: 3.9 10*3/MM3 (ref 1.5–8.3)
NEUTROPHILS NFR BLD AUTO: 63.6 % (ref 41–71)
PLATELET # BLD AUTO: 137 10*3/MM3 (ref 150–450)
PMV BLD AUTO: 8.3 FL (ref 6–12)
POTASSIUM BLD-SCNC: 4.6 MMOL/L (ref 3.5–5.5)
PROT SERPL-MCNC: 6.2 G/DL (ref 5.7–8.2)
RBC # BLD AUTO: 4.97 10*6/MM3 (ref 3.89–5.14)
SODIUM BLD-SCNC: 140 MMOL/L (ref 132–146)
WBC NRBC COR # BLD: 6.2 10*3/MM3 (ref 3.5–10.8)

## 2017-11-08 PROCEDURE — 85025 COMPLETE CBC W/AUTO DIFF WBC: CPT

## 2017-11-08 PROCEDURE — 80053 COMPREHEN METABOLIC PANEL: CPT | Performed by: INTERNAL MEDICINE

## 2017-11-08 PROCEDURE — 86334 IMMUNOFIX E-PHORESIS SERUM: CPT | Performed by: INTERNAL MEDICINE

## 2017-11-08 PROCEDURE — 99213 OFFICE O/P EST LOW 20 MIN: CPT | Performed by: INTERNAL MEDICINE

## 2017-11-08 PROCEDURE — 36415 COLL VENOUS BLD VENIPUNCTURE: CPT

## 2017-11-08 PROCEDURE — 84165 PROTEIN E-PHORESIS SERUM: CPT | Performed by: INTERNAL MEDICINE

## 2017-11-08 NOTE — PROGRESS NOTES
"  PROBLEM LIST:  1. Waldenstrom's macroglobulinemia:  a) Splenomegaly was noted on a CT of the chest on 06/06/2015 done for weight  loss, decreased appetite and left upper quadrant pain.   b) Lab evaluation on 06/16/2015 showed an anemia with hemoglobin 10.2,  thrombocytopenia with platelets 116, a 0.6 g/dL IgM lambda monoclonal protein  was identified on SPEP, beta-2 microglobulin was 10.4, . 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   c) Treatment with Ibrutinib was started 07/2015.   2. Hypertension.   3. Essential tremor.   4. Scoliosis.     Subjective     HISTORY OF PRESENT ILLNESS:   Ms. Yusuf returns for two-month follow-up.  She is doing pretty well.  She has days where she feels pretty tired but she will still get up and do her normal activities.   She has had some loose bowel movements recently - she has about 2 loose stools daily. She has been eating a lot of bananas on the advice of Dr. Vasques because her K was low.  She also started a new medication - chlortalidone, which started around the same time as her diarrhea.  She notes a cough when she first gets up in the morning but no the rest of the day.      Past Medical History, Past Surgical History, Social History, Family History have been reviewed and are without significant changes except as mentioned.    Review of Systems   A comprehensive 14 point review of systems was performed and was negative except as mentioned.    Medications:  The current medication list was reviewed in the EMR    ALLERGIES:    Allergies   Allergen Reactions   • Bactrim [Sulfamethoxazole-Trimethoprim]    • Sulfa Antibiotics      Sulfa Drugs   • Vancomycin    • Zosyn [Piperacillin Sod-Tazobactam So]        Objective      /92 Comment: LUE  Pulse 55  Temp 97.3 °F (36.3 °C) (Temporal Artery )   Resp 16  Ht 62\" (157.5 cm)  Wt 146 lb " (66.2 kg)  BMI 26.7 kg/m2     Performance Status: 1    General: well appearing female in no acute distress  Neuro: alert and oriented  HEENT: sclera anicteric, oropharynx clear  Lymphatics: no cervical, supraclavicular, or axillary adenopathy  Cardiovascular: regular rate and rhythm, no murmurs  Lungs: clear to auscultation bilaterally  Abdomen: soft, nontender, nondistended.  No palpable organomegaly  Extremeties: trace ankle edema bilaterally  Skin: no rashes  Psych: mood and affect appropriate      RECENT LABS:  IgM 150, M spike 0.1 g/dl on 9/13/17    CBC today shows WBC 6.2, hgb 13.1, plt 137.                  Assessment/Plan   Carin Yusuf is a 77 y.o. female with Waldenström's macroglobulinemia who continues on imbruvica.  She has a stable M protein and no evidence of disease progression.      She has been having some diarrhea recently.  This is a side effect of Imbruvica, but she has not had in the past 2 years of taking it.  It may be related to increased fluid intake.  We discussed alternative foods that also contained potassium.    I will see her back in 3 months.                        Visit time was 15 minutes, greater than 50% spent in counseling      Kasey Alvarez MD  [facility] On[facility]    11/8/2017          CC:

## 2017-11-09 LAB
ALBUMIN SERPL-MCNC: 4 G/DL (ref 2.9–4.4)
ALBUMIN/GLOB SERPL: 1.8 {RATIO} (ref 0.7–1.7)
ALPHA1 GLOB FLD ELPH-MCNC: 0.3 G/DL (ref 0–0.4)
ALPHA2 GLOB SERPL ELPH-MCNC: 0.8 G/DL (ref 0.4–1)
B-GLOBULIN SERPL ELPH-MCNC: 0.8 G/DL (ref 0.7–1.3)
GAMMA GLOB SERPL ELPH-MCNC: 0.5 G/DL (ref 0.4–1.8)
GLOBULIN SER CALC-MCNC: 2.3 G/DL (ref 2.2–3.9)
IGA SERPL-MCNC: 18 MG/DL (ref 64–422)
IGG SERPL-MCNC: 389 MG/DL (ref 700–1600)
IGM SERPL-MCNC: 191 MG/DL (ref 26–217)
INTERPRETATION SERPL IEP-IMP: ABNORMAL
Lab: ABNORMAL
M-SPIKE: 0.1 G/DL
PROT SERPL-MCNC: 6.3 G/DL (ref 6–8.5)

## 2017-11-22 RX ORDER — NIFEDIPINE 30 MG/1
TABLET, FILM COATED, EXTENDED RELEASE ORAL
Qty: 30 TABLET | Refills: 1 | Status: SHIPPED | OUTPATIENT
Start: 2017-11-22 | End: 2017-12-18

## 2017-11-22 RX ORDER — MONTELUKAST SODIUM 10 MG/1
TABLET ORAL
Qty: 30 TABLET | Refills: 1 | Status: SHIPPED | OUTPATIENT
Start: 2017-11-22 | End: 2017-12-18 | Stop reason: SDUPTHER

## 2017-11-22 RX ORDER — BENZONATATE 100 MG/1
CAPSULE ORAL
Qty: 50 CAPSULE | Refills: 1 | OUTPATIENT
Start: 2017-11-22

## 2017-11-26 RX ORDER — BENZONATATE 100 MG/1
CAPSULE ORAL
Qty: 50 CAPSULE | Refills: 1 | OUTPATIENT
Start: 2017-11-26

## 2017-12-01 RX ORDER — BENZONATATE 100 MG/1
CAPSULE ORAL
Qty: 50 CAPSULE | Refills: 1 | OUTPATIENT
Start: 2017-12-01

## 2017-12-18 ENCOUNTER — OFFICE VISIT (OUTPATIENT)
Dept: FAMILY MEDICINE CLINIC | Facility: CLINIC | Age: 77
End: 2017-12-18

## 2017-12-18 VITALS
HEIGHT: 62 IN | OXYGEN SATURATION: 97 % | WEIGHT: 141.6 LBS | RESPIRATION RATE: 16 BRPM | SYSTOLIC BLOOD PRESSURE: 146 MMHG | BODY MASS INDEX: 26.06 KG/M2 | HEART RATE: 59 BPM | DIASTOLIC BLOOD PRESSURE: 74 MMHG

## 2017-12-18 DIAGNOSIS — M19.90 ARTHRITIS: ICD-10-CM

## 2017-12-18 DIAGNOSIS — I10 ESSENTIAL HYPERTENSION: Primary | Chronic | ICD-10-CM

## 2017-12-18 PROCEDURE — 99213 OFFICE O/P EST LOW 20 MIN: CPT | Performed by: FAMILY MEDICINE

## 2017-12-18 PROCEDURE — 96372 THER/PROPH/DIAG INJ SC/IM: CPT | Performed by: FAMILY MEDICINE

## 2017-12-18 RX ORDER — BENZONATATE 100 MG/1
100 CAPSULE ORAL 3 TIMES DAILY PRN
Qty: 50 CAPSULE | Refills: 1 | Status: SHIPPED | OUTPATIENT
Start: 2017-12-18 | End: 2017-12-27 | Stop reason: SDUPTHER

## 2017-12-18 RX ORDER — MONTELUKAST SODIUM 10 MG/1
10 TABLET ORAL NIGHTLY
Qty: 90 TABLET | Refills: 1 | Status: SHIPPED | OUTPATIENT
Start: 2017-12-18 | End: 2019-10-20

## 2017-12-18 RX ORDER — LOSARTAN POTASSIUM 100 MG/1
100 TABLET ORAL DAILY
Qty: 90 TABLET | Refills: 1 | Status: SHIPPED | OUTPATIENT
Start: 2017-12-18 | End: 2018-06-01 | Stop reason: SDUPTHER

## 2017-12-18 RX ORDER — AMLODIPINE BESYLATE 5 MG/1
5 TABLET ORAL DAILY
Qty: 30 TABLET | Refills: 0
Start: 2017-12-18 | End: 2018-03-21

## 2017-12-18 RX ORDER — METHYLPREDNISOLONE ACETATE 40 MG/ML
40 INJECTION, SUSPENSION INTRA-ARTICULAR; INTRALESIONAL; INTRAMUSCULAR; SOFT TISSUE ONCE
Status: COMPLETED | OUTPATIENT
Start: 2017-12-18 | End: 2017-12-18

## 2017-12-18 RX ADMIN — METHYLPREDNISOLONE ACETATE 40 MG: 40 INJECTION, SUSPENSION INTRA-ARTICULAR; INTRALESIONAL; INTRAMUSCULAR; SOFT TISSUE at 11:04

## 2017-12-18 NOTE — PROGRESS NOTES
"Subjective   Carin Yusuf is a 77 y.o. female.     Hypertension   This is a chronic (Pressure medicine calls swelling of her ankles so she discontinued the nifedipine.) problem. The problem is unchanged. The problem is uncontrolled. Associated symptoms include peripheral edema. Pertinent negatives include no chest pain, headaches, malaise/fatigue, palpitations or shortness of breath. Risk factors for coronary artery disease include post-menopausal state and sedentary lifestyle. Past treatments include angiotensin blockers, calcium channel blockers and diuretics. The current treatment provides moderate improvement. Compliance problems include medication side effects.  There is no history of angina, kidney disease or CAD/MI.   Arthritis   Presents for follow-up (steroid injection helped a lot in past) visit. She complains of pain and stiffness. Affected location: generalized. Pertinent negatives include no dysuria, fatigue or rash.        The following portions of the patient's history were reviewed and updated as appropriate: allergies, current medications, past social history and problem list.    Review of Systems   Constitutional: Negative for fatigue, malaise/fatigue and unexpected weight change.   HENT: Negative for congestion and sinus pressure.    Respiratory: Negative for cough, chest tightness and shortness of breath.    Cardiovascular: Negative for chest pain, palpitations and leg swelling.   Gastrointestinal: Negative for nausea.   Genitourinary: Negative for dysuria and hematuria.   Musculoskeletal: Positive for arthralgias, arthritis, back pain and stiffness. Negative for myalgias.   Skin: Negative for color change and rash.   Neurological: Positive for tremors. Negative for dizziness, syncope, weakness and headaches.       Objective   /74  Pulse 59  Resp 16  Ht 157.5 cm (62\")  Wt 64.2 kg (141 lb 9.6 oz)  SpO2 97%  BMI 25.9 kg/m2  Physical Exam   Constitutional: She is oriented to person, " place, and time. She appears well-developed and well-nourished. She is cooperative.   HENT:   Head: Normocephalic.   Right Ear: External ear normal.   Left Ear: External ear normal.   Nose: Nose normal.   Mouth/Throat: Oropharynx is clear and moist.   Eyes: Conjunctivae are normal. Pupils are equal, round, and reactive to light. No scleral icterus.   Neck: Neck supple. Carotid bruit is not present. No thyromegaly present.   Cardiovascular: Normal rate, regular rhythm and normal heart sounds.    Pulmonary/Chest: Effort normal and breath sounds normal.   Abdominal: There is no hepatosplenomegaly.   Musculoskeletal: Normal range of motion. She exhibits tenderness. She exhibits no edema.   Significant scoliosis unchanged   Neurological: She is alert and oriented to person, place, and time.   Tremor bilaterally stable   Skin: Skin is warm and dry. No rash noted.   Psychiatric: She has a normal mood and affect. Cognition and memory are normal.   Nursing note and vitals reviewed.      Assessment/Plan   Problem List Items Addressed This Visit     Hypertension - Primary (Chronic)    Relevant Medications    losartan (COZAAR) 100 MG tablet    amLODIPine (NORVASC) 5 MG tablet      Other Visit Diagnoses     Arthritis        Relevant Medications    methylPREDNISolone acetate (DEPO-medrol) injection 40 mg (Completed)          New Medications Ordered This Visit   Medications   • losartan (COZAAR) 100 MG tablet     Sig: Take 1 tablet by mouth Daily.     Dispense:  90 tablet     Refill:  1   • montelukast (SINGULAIR) 10 MG tablet     Sig: Take 1 tablet by mouth Every Night.     Dispense:  90 tablet     Refill:  1   • beclomethasone (QVAR) 80 MCG/ACT inhaler     Sig: Inhale 2 puffs 2 (Two) Times a Day.     Dispense:  8.7 g     Refill:  5   • benzonatate (TESSALON PERLES) 100 MG capsule     Sig: Take 1 capsule by mouth 3 (Three) Times a Day As Needed for Cough.     Dispense:  50 capsule     Refill:  1   • amLODIPine (NORVASC) 5 MG  tablet     Sig: Take 1 tablet by mouth Daily.     Dispense:  30 tablet     Refill:  0   • methylPREDNISolone acetate (DEPO-medrol) injection 40 mg

## 2017-12-26 ENCOUNTER — TELEPHONE (OUTPATIENT)
Dept: FAMILY MEDICINE CLINIC | Facility: CLINIC | Age: 77
End: 2017-12-26

## 2017-12-26 NOTE — TELEPHONE ENCOUNTER
----- Message from Mirian Peoples sent at 12/26/2017 10:42 AM EST -----  Regarding: rx pa  Contact: [phone]  PATIENT'S QVAR 80MICOGRAMS NEEDS PA'D.  [facility] THEY HAVE SENT THIS TO US 3X WITHOUT ANY RESPONSE.  [facility]          Spoke to Jon at the pharmacy, wanted an update on PA.      Re sent PA 12/26/2017  PA CASE: [id number]  KEY: [id number]  Letter of determination will be sent in 24-72 hours from covermymeds

## 2017-12-26 NOTE — TELEPHONE ENCOUNTER
----- Message from Mirian Peoples sent at 12/26/2017 10:42 AM EST -----  Regarding: rx pa  Contact: [phone]  PATIENT'S QVAR 80MICOGRAMS NEEDS RIKI.  [facility] THEY HAVE SENT THIS TO US 3X WITHOUT ANY RESPONSE.  PLEASE CALL JON AT [facility].

## 2017-12-27 ENCOUNTER — OFFICE VISIT (OUTPATIENT)
Dept: FAMILY MEDICINE CLINIC | Facility: CLINIC | Age: 77
End: 2017-12-27

## 2017-12-27 VITALS
HEIGHT: 62 IN | BODY MASS INDEX: 26.02 KG/M2 | HEART RATE: 64 BPM | RESPIRATION RATE: 16 BRPM | SYSTOLIC BLOOD PRESSURE: 122 MMHG | DIASTOLIC BLOOD PRESSURE: 72 MMHG | OXYGEN SATURATION: 97 % | WEIGHT: 141.4 LBS

## 2017-12-27 DIAGNOSIS — J40 BRONCHITIS: Primary | ICD-10-CM

## 2017-12-27 DIAGNOSIS — I10 ESSENTIAL HYPERTENSION: ICD-10-CM

## 2017-12-27 PROCEDURE — 99213 OFFICE O/P EST LOW 20 MIN: CPT | Performed by: FAMILY MEDICINE

## 2017-12-27 RX ORDER — BENZONATATE 100 MG/1
100 CAPSULE ORAL 3 TIMES DAILY PRN
Qty: 50 CAPSULE | Refills: 1 | Status: SHIPPED | OUTPATIENT
Start: 2017-12-27 | End: 2018-03-21

## 2017-12-27 RX ORDER — DOXYCYCLINE HYCLATE 100 MG/1
100 CAPSULE ORAL 2 TIMES DAILY
Qty: 20 CAPSULE | Refills: 1 | Status: SHIPPED | OUTPATIENT
Start: 2017-12-27 | End: 2018-01-30 | Stop reason: HOSPADM

## 2017-12-28 ENCOUNTER — TELEPHONE (OUTPATIENT)
Dept: FAMILY MEDICINE CLINIC | Facility: CLINIC | Age: 77
End: 2017-12-28

## 2017-12-28 NOTE — PROGRESS NOTES
Subjective   Carin Yusuf is a 77 y.o. female.     Cough   This is a new problem. The current episode started in the past 7 days. The problem has been unchanged. The problem occurs every few minutes. The cough is productive of sputum. Associated symptoms include postnasal drip and shortness of breath. Pertinent negatives include no chest pain, chills, fever, headaches, hemoptysis, rash or sore throat. She has tried OTC cough suppressant for the symptoms. The treatment provided no relief. Her past medical history is significant for bronchitis.   Hypertension   This is a chronic problem. The problem is unchanged. Condition status: Patient has spikes of elevated systolic blood pressure. Associated symptoms include shortness of breath. Pertinent negatives include no chest pain, headaches or palpitations. Risk factors for coronary artery disease include post-menopausal state and sedentary lifestyle. Past treatments include calcium channel blockers, beta blockers, diuretics and angiotensin blockers. The current treatment provides significant improvement. There are no compliance problems.  There is no history of angina, kidney disease or CAD/MI.        The following portions of the patient's history were reviewed and updated as appropriate: allergies, current medications, past social history and problem list.    Review of Systems   Constitutional: Negative for chills, fatigue, fever and unexpected weight change.   HENT: Positive for postnasal drip. Negative for congestion and sore throat.    Respiratory: Positive for cough and shortness of breath. Negative for hemoptysis and chest tightness.    Cardiovascular: Negative for chest pain, palpitations and leg swelling.   Gastrointestinal: Negative for nausea.   Genitourinary: Negative for dysuria and hematuria.   Musculoskeletal: Positive for arthralgias and back pain.   Skin: Negative for color change and rash.   Neurological: Negative for dizziness, syncope, weakness  "and headaches.   Psychiatric/Behavioral: Negative.        Objective   /72  Pulse 64  Resp 16  Ht 157.5 cm (62\")  Wt 64.1 kg (141 lb 6.4 oz)  SpO2 97%  BMI 25.86 kg/m2  Physical Exam   Constitutional: She is oriented to person, place, and time. She appears well-developed and well-nourished. She is cooperative.   HENT:   Head: Normocephalic.   Right Ear: External ear normal.   Left Ear: External ear normal.   Nose: Nose normal.   Mouth/Throat: Oropharynx is clear and moist.   Cloudy postnasal drip   Eyes: Conjunctivae are normal. Pupils are equal, round, and reactive to light. No scleral icterus.   Neck: Neck supple. Carotid bruit is not present. No thyromegaly present.   Cardiovascular: Normal rate and regular rhythm.    Pulmonary/Chest: Effort normal. She has no wheezes. She has no rales.   Scattered upper airway rhonchi no rales   Abdominal: There is no hepatosplenomegaly.   Musculoskeletal: Normal range of motion.   Neurological: She is alert and oriented to person, place, and time.   No focal deficits no lateralizing signs   Skin: Skin is warm and dry. No rash noted.   Psychiatric: She has a normal mood and affect. Cognition and memory are normal.   Nursing note and vitals reviewed.      Assessment/Plan   Problem List Items Addressed This Visit     Essential hypertension      Other Visit Diagnoses     Bronchitis    -  Primary    Relevant Medications    benzonatate (TESSALON PERLES) 100 MG capsule    Fluticasone Propionate, Inhal, 100 MCG/BLIST aerosol powder           New Medications Ordered This Visit   Medications   • doxycycline (VIBRAMYCIN) 100 MG capsule     Sig: Take 1 capsule by mouth 2 (Two) Times a Day.     Dispense:  20 capsule     Refill:  1   • benzonatate (TESSALON PERLES) 100 MG capsule     Sig: Take 1 capsule by mouth 3 (Three) Times a Day As Needed for Cough.     Dispense:  50 capsule     Refill:  1   • Fluticasone Propionate, Inhal, 100 MCG/BLIST aerosol powder      Sig: Inhale 1 puff " 2 (Two) Times a Day.     Dispense:  60 each     Refill:  3

## 2017-12-28 NOTE — TELEPHONE ENCOUNTER
----- Message from Misa Akins MA sent at 12/26/2017  2:44 PM EST -----  Regarding: FW: rx pa  Contact: [phone]      ----- Message -----     From: Mirian Peoples     Sent: 12/26/2017  10:42 AM       To: Misa Akins MA  Subject: rx pa                                            PATIENT'S QVAR 80MICOGRAMS NEEDS PA'D.  [facility] THEY HAVE SENT THIS TO US 3X WITHOUT ANY RESPONSE.  PLEASE CALL JON AT [email or web address].      Dr. Vasques prescribed pt a different inhaler.  EUGENE COX

## 2018-01-04 ENCOUNTER — TELEPHONE (OUTPATIENT)
Dept: FAMILY MEDICINE CLINIC | Facility: CLINIC | Age: 78
End: 2018-01-04

## 2018-01-04 NOTE — TELEPHONE ENCOUNTER
Called informed pt and regarding the Flovent I suggested that instead of taking together to wait 1 to 2 hours between doses.  She verbalized understanding.  EUGENE COX    ----- Message from Hilario Vasques MD sent at 1/4/2018  3:55 PM EST -----  Contact: [phone]  Try to finish antibiotic if possible if not I will change  HH must be homebound  ----- Message -----     From: Ranjeet Tejada MA     Sent: 1/4/2018   2:36 PM       To: Hilario Vasques MD    Pt says that she gets sick to her stomach and a pain in her back. She also says that when she coughs her BP goes up and down.  She feels that the antibiotic and the Flovent is effecting her and also wants to know if she can get HH referral.

## 2018-01-17 ENCOUNTER — TELEPHONE (OUTPATIENT)
Dept: ONCOLOGY | Facility: CLINIC | Age: 78
End: 2018-01-17

## 2018-01-17 DIAGNOSIS — R05.9 COUGH: Primary | ICD-10-CM

## 2018-01-17 NOTE — TELEPHONE ENCOUNTER
Returned call to patient, informed her that I had to discuss her symptoms with Dr. Alvarez and Dr. Alvarez is wanting her to go in to have a chest xray.

## 2018-01-17 NOTE — TELEPHONE ENCOUNTER
----- Message from Geena Andujar sent at 1/17/2018  9:17 AM EST -----  Regarding: BALWINDER - PAIN IN ABDOMEN AND CHEST   Contact: [phone]  PATIENT CALLED AND SAID SHE HASN'T BEEN FEELING VERY GOOD. SHE IS WANTING TO KNOW IF DR. BRITT WILL ORDER X-RAY CHEST AND ABDOMEN?     SHE IS HAVE PAIN IN HER ABDOMEN AND CHEST, AND HAS BEEN COUGHING A LOT. HER FAMILY DOCTOR PUT HER ON AN ANTIBIOTIC AND SAID SHE HAD A BACTERIAL INFECTION. HE DIDN'T ORDER ANY TESTING. THE ANTIBIOTICS DON'T SEEM TO BE HELPING, MUCH CAUSE SHE STILL HAS SYMPTOMS. THE SYMPTOMS AREN'T AS BAD AS IT WAS BUT SHE THINKS IT MAY BE SOMETHING ELSE.

## 2018-01-22 ENCOUNTER — HOSPITAL ENCOUNTER (OUTPATIENT)
Dept: GENERAL RADIOLOGY | Facility: HOSPITAL | Age: 78
Discharge: HOME | End: 2018-01-22
Attending: INTERNAL MEDICINE | Admitting: INTERNAL MEDICINE

## 2018-01-22 DIAGNOSIS — R05.9 COUGH: ICD-10-CM

## 2018-01-22 PROCEDURE — 71046 X-RAY EXAM CHEST 2 VIEWS: CPT

## 2018-01-23 ENCOUNTER — TELEPHONE (OUTPATIENT)
Dept: ONCOLOGY | Facility: CLINIC | Age: 78
End: 2018-01-23

## 2018-01-23 NOTE — TELEPHONE ENCOUNTER
Called patient to let her know that the CXR showed no evidence of active infection.    She reports some LUQ pain that she has had for quite some time.  It occasionally radiates over to the right upper quadrant. She says it is not severe but isn't going away.  She is eating and drinking normally.  No fevers or change in bowel movements.      I will see her in early February and will further evaluate at that visit.  She will call if it significantly worsens in the meantime.

## 2018-01-29 ENCOUNTER — APPOINTMENT (OUTPATIENT)
Dept: CT IMAGING | Facility: HOSPITAL | Age: 78
End: 2018-01-29

## 2018-01-29 ENCOUNTER — ANESTHESIA EVENT (OUTPATIENT)
Dept: GASTROENTEROLOGY | Facility: HOSPITAL | Age: 78
End: 2018-01-29

## 2018-01-29 ENCOUNTER — HOSPITAL ENCOUNTER (OUTPATIENT)
Facility: HOSPITAL | Age: 78
Discharge: HOME | End: 2018-01-30
Attending: EMERGENCY MEDICINE | Admitting: INTERNAL MEDICINE

## 2018-01-29 ENCOUNTER — ANESTHESIA (OUTPATIENT)
Dept: GASTROENTEROLOGY | Facility: HOSPITAL | Age: 78
End: 2018-01-29

## 2018-01-29 ENCOUNTER — APPOINTMENT (OUTPATIENT)
Dept: GENERAL RADIOLOGY | Facility: HOSPITAL | Age: 78
End: 2018-01-29

## 2018-01-29 DIAGNOSIS — C88.0 WALDENSTROM'S MACROGLOBULINEMIA (HCC): ICD-10-CM

## 2018-01-29 DIAGNOSIS — M41.04 INFANTILE IDIOPATHIC SCOLIOSIS OF THORACIC REGION: Chronic | ICD-10-CM

## 2018-01-29 DIAGNOSIS — K31.89 GASTRIC WALL THICKENING: ICD-10-CM

## 2018-01-29 DIAGNOSIS — R10.12 LUQ ABDOMINAL PAIN: Primary | ICD-10-CM

## 2018-01-29 DIAGNOSIS — N18.30 CHRONIC RENAL IMPAIRMENT, STAGE 3 (MODERATE) (HCC): ICD-10-CM

## 2018-01-29 DIAGNOSIS — C88.0 WALDENSTROM MACROGLOBULINEMIA (HCC): Chronic | ICD-10-CM

## 2018-01-29 DIAGNOSIS — R10.9 INTRACTABLE ABDOMINAL PAIN: ICD-10-CM

## 2018-01-29 DIAGNOSIS — I10 ESSENTIAL HYPERTENSION: ICD-10-CM

## 2018-01-29 PROBLEM — R16.1 SPLENOMEGALY: Status: ACTIVE | Noted: 2018-01-29

## 2018-01-29 PROBLEM — J45.909 ASTHMA: Chronic | Status: ACTIVE | Noted: 2018-01-29

## 2018-01-29 PROBLEM — D69.6 THROMBOCYTOPENIA (HCC): Status: ACTIVE | Noted: 2018-01-29

## 2018-01-29 LAB
ALBUMIN SERPL-MCNC: 4.1 G/DL (ref 3.2–4.8)
ALBUMIN/GLOB SERPL: 2 G/DL (ref 1.5–2.5)
ALP SERPL-CCNC: 89 U/L (ref 25–100)
ALT SERPL W P-5'-P-CCNC: 5 U/L (ref 7–40)
ANION GAP SERPL CALCULATED.3IONS-SCNC: 4 MMOL/L (ref 3–11)
AST SERPL-CCNC: 15 U/L (ref 0–33)
BASOPHILS # BLD AUTO: 0.03 10*3/MM3 (ref 0–0.2)
BASOPHILS NFR BLD AUTO: 0.5 % (ref 0–1)
BILIRUB SERPL-MCNC: 1.1 MG/DL (ref 0.3–1.2)
BILIRUB UR QL STRIP: NEGATIVE
BNP SERPL-MCNC: 137 PG/ML (ref 0–100)
BUN BLD-MCNC: 16 MG/DL (ref 9–23)
BUN/CREAT SERPL: 10 (ref 7–25)
CALCIUM SPEC-SCNC: 9.2 MG/DL (ref 8.7–10.4)
CHLORIDE SERPL-SCNC: 102 MMOL/L (ref 99–109)
CLARITY UR: CLEAR
CO2 SERPL-SCNC: 32 MMOL/L (ref 20–31)
COLOR UR: YELLOW
CREAT BLD-MCNC: 1.6 MG/DL (ref 0.6–1.3)
DEPRECATED RDW RBC AUTO: 43.8 FL (ref 37–54)
EOSINOPHIL # BLD AUTO: 0.06 10*3/MM3 (ref 0–0.3)
EOSINOPHIL NFR BLD AUTO: 0.9 % (ref 0–3)
ERYTHROCYTE [DISTWIDTH] IN BLOOD BY AUTOMATED COUNT: 14.5 % (ref 11.3–14.5)
GFR SERPL CREATININE-BSD FRML MDRD: 31 ML/MIN/1.73
GLOBULIN UR ELPH-MCNC: 2.1 GM/DL
GLUCOSE BLD-MCNC: 101 MG/DL (ref 70–100)
GLUCOSE UR STRIP-MCNC: NEGATIVE MG/DL
HCT VFR BLD AUTO: 41.7 % (ref 34.5–44)
HGB BLD-MCNC: 13.1 G/DL (ref 11.5–15.5)
HGB UR QL STRIP.AUTO: NEGATIVE
HOLD SPECIMEN: NORMAL
HOLD SPECIMEN: NORMAL
IMM GRANULOCYTES # BLD: 0.01 10*3/MM3 (ref 0–0.03)
IMM GRANULOCYTES NFR BLD: 0.2 % (ref 0–0.6)
KETONES UR QL STRIP: NEGATIVE
LEUKOCYTE ESTERASE UR QL STRIP.AUTO: NEGATIVE
LIPASE SERPL-CCNC: 75 U/L (ref 6–51)
LYMPHOCYTES # BLD AUTO: 2.44 10*3/MM3 (ref 0.6–4.8)
LYMPHOCYTES NFR BLD AUTO: 36.8 % (ref 24–44)
MCH RBC QN AUTO: 26.1 PG (ref 27–31)
MCHC RBC AUTO-ENTMCNC: 31.4 G/DL (ref 32–36)
MCV RBC AUTO: 83.1 FL (ref 80–99)
MONOCYTES # BLD AUTO: 0.3 10*3/MM3 (ref 0–1)
MONOCYTES NFR BLD AUTO: 4.5 % (ref 0–12)
NEUTROPHILS # BLD AUTO: 3.79 10*3/MM3 (ref 1.5–8.3)
NEUTROPHILS NFR BLD AUTO: 57.1 % (ref 41–71)
NITRITE UR QL STRIP: NEGATIVE
PH UR STRIP.AUTO: 7 [PH] (ref 5–8)
PLATELET # BLD AUTO: 108 10*3/MM3 (ref 150–450)
PMV BLD AUTO: 11.2 FL (ref 6–12)
POTASSIUM BLD-SCNC: 3.9 MMOL/L (ref 3.5–5.5)
PROT SERPL-MCNC: 6.2 G/DL (ref 5.7–8.2)
PROT UR QL STRIP: ABNORMAL
RBC # BLD AUTO: 5.02 10*6/MM3 (ref 3.89–5.14)
SODIUM BLD-SCNC: 138 MMOL/L (ref 132–146)
SP GR UR STRIP: 1.01 (ref 1–1.03)
TROPONIN I SERPL-MCNC: 0.01 NG/ML (ref 0–0.07)
UROBILINOGEN UR QL STRIP: ABNORMAL
WBC NRBC COR # BLD: 6.63 10*3/MM3 (ref 3.5–10.8)
WHOLE BLOOD HOLD SPECIMEN: NORMAL
WHOLE BLOOD HOLD SPECIMEN: NORMAL

## 2018-01-29 PROCEDURE — 99232 SBSQ HOSP IP/OBS MODERATE 35: CPT | Performed by: INTERNAL MEDICINE

## 2018-01-29 PROCEDURE — 96361 HYDRATE IV INFUSION ADD-ON: CPT

## 2018-01-29 PROCEDURE — 74176 CT ABD & PELVIS W/O CONTRAST: CPT

## 2018-01-29 PROCEDURE — 99219 PR INITIAL OBSERVATION CARE/DAY 50 MINUTES: CPT | Performed by: INTERNAL MEDICINE

## 2018-01-29 PROCEDURE — 83690 ASSAY OF LIPASE: CPT | Performed by: EMERGENCY MEDICINE

## 2018-01-29 PROCEDURE — 96374 THER/PROPH/DIAG INJ IV PUSH: CPT

## 2018-01-29 PROCEDURE — 99284 EMERGENCY DEPT VISIT MOD MDM: CPT

## 2018-01-29 PROCEDURE — G0378 HOSPITAL OBSERVATION PER HR: HCPCS

## 2018-01-29 PROCEDURE — 88305 TISSUE EXAM BY PATHOLOGIST: CPT | Performed by: INTERNAL MEDICINE

## 2018-01-29 PROCEDURE — 83880 ASSAY OF NATRIURETIC PEPTIDE: CPT | Performed by: EMERGENCY MEDICINE

## 2018-01-29 PROCEDURE — 96375 TX/PRO/DX INJ NEW DRUG ADDON: CPT

## 2018-01-29 PROCEDURE — 82784 ASSAY IGA/IGD/IGG/IGM EACH: CPT | Performed by: INTERNAL MEDICINE

## 2018-01-29 PROCEDURE — 85025 COMPLETE CBC W/AUTO DIFF WBC: CPT | Performed by: EMERGENCY MEDICINE

## 2018-01-29 PROCEDURE — 84484 ASSAY OF TROPONIN QUANT: CPT

## 2018-01-29 PROCEDURE — 81003 URINALYSIS AUTO W/O SCOPE: CPT | Performed by: EMERGENCY MEDICINE

## 2018-01-29 PROCEDURE — 80053 COMPREHEN METABOLIC PANEL: CPT | Performed by: EMERGENCY MEDICINE

## 2018-01-29 PROCEDURE — 82785 ASSAY OF IGE: CPT | Performed by: INTERNAL MEDICINE

## 2018-01-29 PROCEDURE — 25010000002 PROPOFOL 10 MG/ML EMULSION: Performed by: NURSE ANESTHETIST, CERTIFIED REGISTERED

## 2018-01-29 PROCEDURE — 93005 ELECTROCARDIOGRAM TRACING: CPT | Performed by: EMERGENCY MEDICINE

## 2018-01-29 PROCEDURE — 84165 PROTEIN E-PHORESIS SERUM: CPT | Performed by: INTERNAL MEDICINE

## 2018-01-29 PROCEDURE — 25010000002 MORPHINE SULFATE (PF) 2 MG/ML SOLUTION: Performed by: NURSE PRACTITIONER

## 2018-01-29 PROCEDURE — 86334 IMMUNOFIX E-PHORESIS SERUM: CPT | Performed by: INTERNAL MEDICINE

## 2018-01-29 PROCEDURE — 96376 TX/PRO/DX INJ SAME DRUG ADON: CPT

## 2018-01-29 PROCEDURE — 99204 OFFICE O/P NEW MOD 45 MIN: CPT | Performed by: PHYSICIAN ASSISTANT

## 2018-01-29 PROCEDURE — 25010000002 ONDANSETRON PER 1 MG: Performed by: EMERGENCY MEDICINE

## 2018-01-29 PROCEDURE — 25010000002 MORPHINE SULFATE (PF) 2 MG/ML SOLUTION: Performed by: EMERGENCY MEDICINE

## 2018-01-29 PROCEDURE — 71045 X-RAY EXAM CHEST 1 VIEW: CPT

## 2018-01-29 RX ORDER — LOSARTAN POTASSIUM 50 MG/1
100 TABLET ORAL DAILY
Status: DISCONTINUED | OUTPATIENT
Start: 2018-01-29 | End: 2018-01-30 | Stop reason: HOSPADM

## 2018-01-29 RX ORDER — PROPOFOL 10 MG/ML
VIAL (ML) INTRAVENOUS AS NEEDED
Status: DISCONTINUED | OUTPATIENT
Start: 2018-01-29 | End: 2018-01-29 | Stop reason: SURG

## 2018-01-29 RX ORDER — ONDANSETRON 2 MG/ML
4 INJECTION INTRAMUSCULAR; INTRAVENOUS ONCE AS NEEDED
Status: DISCONTINUED | OUTPATIENT
Start: 2018-01-29 | End: 2018-01-29 | Stop reason: HOSPADM

## 2018-01-29 RX ORDER — SODIUM CHLORIDE 9 MG/ML
75 INJECTION, SOLUTION INTRAVENOUS CONTINUOUS
Status: DISCONTINUED | OUTPATIENT
Start: 2018-01-29 | End: 2018-01-29

## 2018-01-29 RX ORDER — FLUTICASONE PROPIONATE 50 MCG
1 SPRAY, SUSPENSION (ML) NASAL DAILY PRN
Status: DISCONTINUED | OUTPATIENT
Start: 2018-01-29 | End: 2018-01-30 | Stop reason: HOSPADM

## 2018-01-29 RX ORDER — ALBUTEROL SULFATE 2.5 MG/3ML
2.5 SOLUTION RESPIRATORY (INHALATION) EVERY 4 HOURS PRN
Status: DISCONTINUED | OUTPATIENT
Start: 2018-01-29 | End: 2018-01-30 | Stop reason: HOSPADM

## 2018-01-29 RX ORDER — MORPHINE SULFATE 2 MG/ML
2 INJECTION, SOLUTION INTRAMUSCULAR; INTRAVENOUS
Status: DISCONTINUED | OUTPATIENT
Start: 2018-01-29 | End: 2018-01-30 | Stop reason: HOSPADM

## 2018-01-29 RX ORDER — FAMOTIDINE 10 MG/ML
20 INJECTION, SOLUTION INTRAVENOUS ONCE
Status: COMPLETED | OUTPATIENT
Start: 2018-01-29 | End: 2018-01-29

## 2018-01-29 RX ORDER — SODIUM CHLORIDE 9 MG/ML
75 INJECTION, SOLUTION INTRAVENOUS CONTINUOUS
Status: ACTIVE | OUTPATIENT
Start: 2018-01-29 | End: 2018-01-29

## 2018-01-29 RX ORDER — LIDOCAINE HYDROCHLORIDE 10 MG/ML
INJECTION, SOLUTION INFILTRATION; PERINEURAL AS NEEDED
Status: DISCONTINUED | OUTPATIENT
Start: 2018-01-29 | End: 2018-01-29 | Stop reason: SURG

## 2018-01-29 RX ORDER — AMLODIPINE BESYLATE 5 MG/1
5 TABLET ORAL DAILY
Status: DISCONTINUED | OUTPATIENT
Start: 2018-01-29 | End: 2018-01-30 | Stop reason: HOSPADM

## 2018-01-29 RX ORDER — FAMOTIDINE 10 MG/ML
20 INJECTION, SOLUTION INTRAVENOUS DAILY
Status: DISCONTINUED | OUTPATIENT
Start: 2018-01-29 | End: 2018-01-29

## 2018-01-29 RX ORDER — PANTOPRAZOLE SODIUM 40 MG/1
40 TABLET, DELAYED RELEASE ORAL
Status: DISCONTINUED | OUTPATIENT
Start: 2018-01-29 | End: 2018-01-30 | Stop reason: HOSPADM

## 2018-01-29 RX ORDER — ASPIRIN 81 MG/1
324 TABLET, CHEWABLE ORAL ONCE
Status: DISCONTINUED | OUTPATIENT
Start: 2018-01-29 | End: 2018-01-29

## 2018-01-29 RX ORDER — ACETAMINOPHEN 325 MG/1
650 TABLET ORAL EVERY 4 HOURS PRN
Status: DISCONTINUED | OUTPATIENT
Start: 2018-01-29 | End: 2018-01-30 | Stop reason: HOSPADM

## 2018-01-29 RX ORDER — ONDANSETRON 2 MG/ML
4 INJECTION INTRAMUSCULAR; INTRAVENOUS EVERY 6 HOURS PRN
Status: DISCONTINUED | OUTPATIENT
Start: 2018-01-29 | End: 2018-01-30 | Stop reason: HOSPADM

## 2018-01-29 RX ORDER — SODIUM CHLORIDE 0.9 % (FLUSH) 0.9 %
1-10 SYRINGE (ML) INJECTION AS NEEDED
Status: DISCONTINUED | OUTPATIENT
Start: 2018-01-29 | End: 2018-01-30 | Stop reason: HOSPADM

## 2018-01-29 RX ORDER — MORPHINE SULFATE 2 MG/ML
2 INJECTION, SOLUTION INTRAMUSCULAR; INTRAVENOUS ONCE
Status: COMPLETED | OUTPATIENT
Start: 2018-01-29 | End: 2018-01-29

## 2018-01-29 RX ORDER — MONTELUKAST SODIUM 10 MG/1
10 TABLET ORAL NIGHTLY
Status: DISCONTINUED | OUTPATIENT
Start: 2018-01-29 | End: 2018-01-30 | Stop reason: HOSPADM

## 2018-01-29 RX ORDER — ATENOLOL 50 MG/1
100 TABLET ORAL
Status: DISCONTINUED | OUTPATIENT
Start: 2018-01-29 | End: 2018-01-30

## 2018-01-29 RX ORDER — BUDESONIDE 0.5 MG/2ML
0.5 INHALANT ORAL
Status: DISCONTINUED | OUTPATIENT
Start: 2018-01-29 | End: 2018-01-30 | Stop reason: HOSPADM

## 2018-01-29 RX ORDER — ONDANSETRON 2 MG/ML
4 INJECTION INTRAMUSCULAR; INTRAVENOUS ONCE
Status: COMPLETED | OUTPATIENT
Start: 2018-01-29 | End: 2018-01-29

## 2018-01-29 RX ORDER — ONDANSETRON 4 MG/1
4 TABLET, FILM COATED ORAL EVERY 6 HOURS PRN
Status: DISCONTINUED | OUTPATIENT
Start: 2018-01-29 | End: 2018-01-30 | Stop reason: HOSPADM

## 2018-01-29 RX ORDER — SODIUM CHLORIDE 0.9 % (FLUSH) 0.9 %
10 SYRINGE (ML) INJECTION AS NEEDED
Status: DISCONTINUED | OUTPATIENT
Start: 2018-01-29 | End: 2018-01-29

## 2018-01-29 RX ADMIN — AMLODIPINE BESYLATE 5 MG: 5 TABLET ORAL at 08:44

## 2018-01-29 RX ADMIN — LOSARTAN POTASSIUM 100 MG: 50 TABLET ORAL at 08:44

## 2018-01-29 RX ADMIN — EPHEDRINE SULFATE 10 MG: 50 INJECTION INTRAMUSCULAR; INTRAVENOUS; SUBCUTANEOUS at 13:31

## 2018-01-29 RX ADMIN — SODIUM CHLORIDE 500 ML: 9 INJECTION, SOLUTION INTRAVENOUS at 04:19

## 2018-01-29 RX ADMIN — SODIUM CHLORIDE 75 ML/HR: 9 INJECTION, SOLUTION INTRAVENOUS at 06:40

## 2018-01-29 RX ADMIN — MORPHINE SULFATE 2 MG: 25 INJECTION, SOLUTION, CONCENTRATE INTRAVENOUS at 04:23

## 2018-01-29 RX ADMIN — SODIUM CHLORIDE: 9 INJECTION, SOLUTION INTRAVENOUS at 13:14

## 2018-01-29 RX ADMIN — PROPOFOL 90 MG: 10 INJECTION, EMULSION INTRAVENOUS at 13:28

## 2018-01-29 RX ADMIN — FAMOTIDINE 20 MG: 10 INJECTION, SOLUTION INTRAVENOUS at 04:21

## 2018-01-29 RX ADMIN — MORPHINE SULFATE 2 MG: 25 INJECTION, SOLUTION, CONCENTRATE INTRAVENOUS at 08:40

## 2018-01-29 RX ADMIN — PROPOFOL 50 MG: 10 INJECTION, EMULSION INTRAVENOUS at 13:18

## 2018-01-29 RX ADMIN — LIDOCAINE HYDROCHLORIDE 50 MG: 10 INJECTION, SOLUTION INFILTRATION; PERINEURAL at 13:18

## 2018-01-29 RX ADMIN — ONDANSETRON 4 MG: 2 INJECTION INTRAMUSCULAR; INTRAVENOUS at 04:19

## 2018-01-29 RX ADMIN — PANTOPRAZOLE SODIUM 40 MG: 40 TABLET, DELAYED RELEASE ORAL at 06:55

## 2018-01-29 RX ADMIN — MORPHINE SULFATE 2 MG: 25 INJECTION, SOLUTION, CONCENTRATE INTRAVENOUS at 11:50

## 2018-01-29 NOTE — ANESTHESIA POSTPROCEDURE EVALUATION
Patient: Carin Yusuf    Procedure Summary     Date Anesthesia Start Anesthesia Stop Room / Location    01/29/18 1314 1336  [facility]       Procedure Diagnosis Surgeon Provider    ESOPHAGOGASTRODUODENOSCOPY WITH BIOPSY (N/A Esophagus) Intractable abdominal pain; Gastric wall thickening  (Intractable abdominal pain [R10.9]; Gastric wall thickening [K31.89]) Mark I Brunner, MD Stephen Kronenberg, MD          Anesthesia Type: general  Last vitals  BP   106/46 (01/29/18 1336)   Temp   97.4 °F (36.3 °C) (01/29/18 1336)   Pulse   (!) 44 (01/29/18 1336)   Resp   16 (01/29/18 1336)     SpO2   99 % (01/29/18 1336)     Post Anesthesia Care and Evaluation    Patient location during evaluation: PACU  Patient participation: waiting for patient participation  Level of consciousness: sleepy but conscious  Pain score: 0  Pain management: adequate  Airway patency: patent  Anesthetic complications: No anesthetic complications  PONV Status: none  Cardiovascular status: hemodynamically stable and acceptable  Respiratory status: nonlabored ventilation, acceptable and nasal cannula  Hydration status: acceptable

## 2018-01-29 NOTE — ANESTHESIA PREPROCEDURE EVALUATION
Anesthesia Evaluation     Patient summary reviewed and Nursing notes reviewed   no history of anesthetic complications:  NPO Solid Status: > 8 hours  NPO Liquid Status: > 8 hours     Airway   Mallampati: II  TM distance: >3 FB  Neck ROM: full  no difficulty expected  Dental      Pulmonary - normal exam   (+) pneumonia , asthma,   Cardiovascular - normal exam    (+) hypertension,       Neuro/Psych  GI/Hepatic/Renal/Endo    (+)  GERD,     Musculoskeletal     Abdominal    Substance History      OB/GYN          Other      history of cancer                                            Anesthesia Plan    ASA 2     general     intravenous induction   Anesthetic plan and risks discussed with patient.    Plan discussed with CRNA.

## 2018-01-30 ENCOUNTER — EPISODE CHANGES (OUTPATIENT)
Dept: CASE MANAGEMENT | Facility: OTHER | Age: 78
End: 2018-01-30

## 2018-01-30 VITALS
RESPIRATION RATE: 18 BRPM | HEART RATE: 53 BPM | SYSTOLIC BLOOD PRESSURE: 170 MMHG | HEIGHT: 62 IN | OXYGEN SATURATION: 87 % | DIASTOLIC BLOOD PRESSURE: 68 MMHG | BODY MASS INDEX: 25.8 KG/M2 | TEMPERATURE: 97.5 F | WEIGHT: 140.2 LBS

## 2018-01-30 LAB
ALBUMIN SERPL-MCNC: 3.1 G/DL (ref 2.9–4.4)
ALBUMIN/GLOB SERPL: 1.9 {RATIO} (ref 0.7–1.7)
ALPHA1 GLOB FLD ELPH-MCNC: 0.2 G/DL (ref 0–0.4)
ALPHA2 GLOB SERPL ELPH-MCNC: 0.6 G/DL (ref 0.4–1)
B-GLOBULIN SERPL ELPH-MCNC: 0.5 G/DL (ref 0.7–1.3)
CYTO UR: NORMAL
GAMMA GLOB SERPL ELPH-MCNC: 0.4 G/DL (ref 0.4–1.8)
GLOBULIN SER CALC-MCNC: 1.7 G/DL (ref 2.2–3.9)
IGA SERPL-MCNC: 15 MG/DL (ref 64–422)
IGG SERPL-MCNC: 266 MG/DL (ref 700–1600)
IGM SERPL-MCNC: 136 MG/DL (ref 26–217)
INTERPRETATION SERPL IEP-IMP: ABNORMAL
LAB AP CASE REPORT: NORMAL
LAB AP CLINICAL INFORMATION: NORMAL
Lab: ABNORMAL
Lab: NORMAL
M-SPIKE: 0.1 G/DL
PATH REPORT.FINAL DX SPEC: NORMAL
PATH REPORT.GROSS SPEC: NORMAL
PROT SERPL-MCNC: 4.8 G/DL (ref 6–8.5)

## 2018-01-30 PROCEDURE — 63710000001 LOSARTAN 50 MG TABLET: Performed by: NURSE PRACTITIONER

## 2018-01-30 PROCEDURE — 63710000001 AMLODIPINE 5 MG TABLET: Performed by: NURSE PRACTITIONER

## 2018-01-30 PROCEDURE — A9270 NON-COVERED ITEM OR SERVICE: HCPCS | Performed by: NURSE PRACTITIONER

## 2018-01-30 PROCEDURE — 99217 PR OBSERVATION CARE DISCHARGE MANAGEMENT: CPT | Performed by: INTERNAL MEDICINE

## 2018-01-30 PROCEDURE — G0378 HOSPITAL OBSERVATION PER HR: HCPCS

## 2018-01-30 PROCEDURE — A9270 NON-COVERED ITEM OR SERVICE: HCPCS | Performed by: INTERNAL MEDICINE

## 2018-01-30 PROCEDURE — 63710000001 PANTOPRAZOLE 40 MG TABLET DELAYED-RELEASE: Performed by: INTERNAL MEDICINE

## 2018-01-30 PROCEDURE — 94799 UNLISTED PULMONARY SVC/PX: CPT

## 2018-01-30 RX ORDER — OXYCODONE HYDROCHLORIDE AND ACETAMINOPHEN 5; 325 MG/1; MG/1
1 TABLET ORAL EVERY 6 HOURS PRN
Qty: 21 TABLET | Refills: 0 | Status: SHIPPED | OUTPATIENT
Start: 2018-01-30 | End: 2018-02-28

## 2018-01-30 RX ORDER — ATENOLOL 50 MG/1
50 TABLET ORAL
Status: DISCONTINUED | OUTPATIENT
Start: 2018-01-30 | End: 2018-01-30 | Stop reason: HOSPADM

## 2018-01-30 RX ORDER — HEPARIN SODIUM 5000 [USP'U]/ML
5000 INJECTION, SOLUTION INTRAVENOUS; SUBCUTANEOUS EVERY 8 HOURS SCHEDULED
Status: DISCONTINUED | OUTPATIENT
Start: 2018-01-30 | End: 2018-01-30 | Stop reason: HOSPADM

## 2018-01-30 RX ADMIN — AMLODIPINE BESYLATE 5 MG: 5 TABLET ORAL at 09:11

## 2018-01-30 RX ADMIN — PANTOPRAZOLE SODIUM 40 MG: 40 TABLET, DELAYED RELEASE ORAL at 06:08

## 2018-01-30 RX ADMIN — LOSARTAN POTASSIUM 100 MG: 50 TABLET ORAL at 09:11

## 2018-01-31 ENCOUNTER — TRANSITIONAL CARE MANAGEMENT TELEPHONE ENCOUNTER (OUTPATIENT)
Dept: FAMILY MEDICINE CLINIC | Facility: CLINIC | Age: 78
End: 2018-01-31

## 2018-01-31 NOTE — OUTREACH NOTE
"JESSICA call completed.  Please refer to TCM call flowsheet for call documentation.  Patient reports that she is doing well.  They do have some confusion about the patient's atenolol.  They were told that if her \"blood oxygen was below 50 to not take the atenolol.\"  I can't find this in the chart.  Please advise patient on parameters of medication.  I think that this may have been confused with HR.  Patient's HR has been 55--63 and her O2 has been 85% to 92%.  It dops in the 80's when she is walking.  no c/o dizziness.  Please advise patient today.  She has not taken the medication yet.  Thank you.  "

## 2018-02-02 LAB
IGA SERPL-MCNC: 14 MG/DL (ref 64–422)
IGG SERPL-MCNC: 276 MG/DL (ref 700–1600)
IGM SERPL-MCNC: 133 MG/DL (ref 26–217)
TOTAL IGE SMQN RAST: 2 IU/ML (ref 0–100)

## 2018-02-02 RX ORDER — ATENOLOL 100 MG/1
TABLET ORAL
Qty: 90 TABLET | Refills: 0 | Status: SHIPPED | OUTPATIENT
Start: 2018-02-02 | End: 2018-02-08

## 2018-02-06 ENCOUNTER — TELEPHONE (OUTPATIENT)
Dept: ONCOLOGY | Facility: CLINIC | Age: 78
End: 2018-02-06

## 2018-02-06 ENCOUNTER — OFFICE VISIT (OUTPATIENT)
Dept: ONCOLOGY | Facility: CLINIC | Age: 78
End: 2018-02-06

## 2018-02-06 ENCOUNTER — LAB (OUTPATIENT)
Dept: LAB | Facility: HOSPITAL | Age: 78
End: 2018-02-06

## 2018-02-06 VITALS
BODY MASS INDEX: 25.36 KG/M2 | SYSTOLIC BLOOD PRESSURE: 182 MMHG | RESPIRATION RATE: 18 BRPM | HEIGHT: 62 IN | DIASTOLIC BLOOD PRESSURE: 75 MMHG | WEIGHT: 137.8 LBS | TEMPERATURE: 97.7 F | HEART RATE: 58 BPM | OXYGEN SATURATION: 88 %

## 2018-02-06 DIAGNOSIS — K31.89 GASTRIC WALL THICKENING: ICD-10-CM

## 2018-02-06 DIAGNOSIS — C88.0 WALDENSTROM MACROGLOBULINEMIA (HCC): ICD-10-CM

## 2018-02-06 DIAGNOSIS — C88.0 WALDENSTROM MACROGLOBULINEMIA (HCC): Primary | Chronic | ICD-10-CM

## 2018-02-06 DIAGNOSIS — N18.30 CHRONIC KIDNEY DISEASE, STAGE III (MODERATE) (HCC): Primary | ICD-10-CM

## 2018-02-06 LAB
ALBUMIN SERPL-MCNC: 4.3 G/DL (ref 3.2–4.8)
ALBUMIN/GLOB SERPL: 2.5 G/DL (ref 1.5–2.5)
ALP SERPL-CCNC: 82 U/L (ref 25–100)
ALT SERPL W P-5'-P-CCNC: 4 U/L (ref 7–40)
ANION GAP SERPL CALCULATED.3IONS-SCNC: 9 MMOL/L (ref 3–11)
AST SERPL-CCNC: 13 U/L (ref 0–33)
BACTERIA UR QL AUTO: ABNORMAL /HPF
BILIRUB SERPL-MCNC: 1 MG/DL (ref 0.3–1.2)
BILIRUB UR QL STRIP: NEGATIVE
BLADDER EPITHELIAL: ABNORMAL /LPF
BUN BLD-MCNC: 25 MG/DL (ref 9–23)
BUN/CREAT SERPL: 13.9 (ref 7–25)
CALCIUM SPEC-SCNC: 8.8 MG/DL (ref 8.7–10.4)
CHLORIDE SERPL-SCNC: 97 MMOL/L (ref 99–109)
CLARITY UR: CLEAR
CO2 SERPL-SCNC: 33 MMOL/L (ref 20–31)
COLOR UR: YELLOW
CREAT BLD-MCNC: 1.8 MG/DL (ref 0.6–1.3)
CREAT UR-MCNC: 295.8 MG/DL
ERYTHROCYTE [DISTWIDTH] IN BLOOD BY AUTOMATED COUNT: 16.3 % (ref 11.3–14.5)
GFR SERPL CREATININE-BSD FRML MDRD: 27 ML/MIN/1.73
GLOBULIN UR ELPH-MCNC: 1.7 GM/DL
GLUCOSE BLD-MCNC: 99 MG/DL (ref 70–100)
GLUCOSE UR STRIP-MCNC: NEGATIVE MG/DL
HCT VFR BLD AUTO: 40.3 % (ref 34.5–44)
HGB BLD-MCNC: 12.8 G/DL (ref 11.5–15.5)
HGB UR QL STRIP.AUTO: NEGATIVE
HYALINE CASTS UR QL AUTO: ABNORMAL /LPF
KETONES UR QL STRIP: NEGATIVE
LEUKOCYTE ESTERASE UR QL STRIP.AUTO: NEGATIVE
LYMPHOCYTES # BLD AUTO: 2.3 10*3/MM3 (ref 0.6–4.8)
LYMPHOCYTES NFR BLD AUTO: 31.8 % (ref 24–44)
MCH RBC QN AUTO: 25.9 PG (ref 27–31)
MCHC RBC AUTO-ENTMCNC: 31.8 G/DL (ref 32–36)
MCV RBC AUTO: 81.4 FL (ref 80–99)
MONOCYTES # BLD AUTO: 0.2 10*3/MM3 (ref 0–1)
MONOCYTES NFR BLD AUTO: 2.1 % (ref 0–12)
MUCOUS THREADS URNS QL MICRO: ABNORMAL /HPF
NEUTROPHILS # BLD AUTO: 4.8 10*3/MM3 (ref 1.5–8.3)
NEUTROPHILS NFR BLD AUTO: 66.1 % (ref 41–71)
NITRITE UR QL STRIP: NEGATIVE
PH UR STRIP.AUTO: 6 [PH] (ref 5–8)
PHOSPHATE SERPL-MCNC: 4.5 MG/DL (ref 2.4–5.1)
PLATELET # BLD AUTO: 162 10*3/MM3 (ref 150–450)
PMV BLD AUTO: 9 FL (ref 6–12)
POTASSIUM BLD-SCNC: 3.6 MMOL/L (ref 3.5–5.5)
PROT SERPL-MCNC: 6 G/DL (ref 5.7–8.2)
PROT UR QL STRIP: ABNORMAL
PROT UR-MCNC: 62 MG/DL (ref 1–14)
RBC # BLD AUTO: 4.95 10*6/MM3 (ref 3.89–5.14)
RBC # UR: ABNORMAL /HPF
REF LAB TEST METHOD: ABNORMAL
SODIUM BLD-SCNC: 139 MMOL/L (ref 132–146)
SP GR UR STRIP: 1.02 (ref 1–1.03)
SQUAMOUS #/AREA URNS HPF: ABNORMAL /HPF
URATE SERPL-MCNC: 8.3 MG/DL (ref 3.1–7.8)
UROBILINOGEN UR QL STRIP: ABNORMAL
WBC NRBC COR # BLD: 7.2 10*3/MM3 (ref 3.5–10.8)
WBC UR QL AUTO: ABNORMAL /HPF

## 2018-02-06 PROCEDURE — 99214 OFFICE O/P EST MOD 30 MIN: CPT | Performed by: INTERNAL MEDICINE

## 2018-02-06 PROCEDURE — 84550 ASSAY OF BLOOD/URIC ACID: CPT

## 2018-02-06 PROCEDURE — 86334 IMMUNOFIX E-PHORESIS SERUM: CPT

## 2018-02-06 PROCEDURE — 82784 ASSAY IGA/IGD/IGG/IGM EACH: CPT

## 2018-02-06 PROCEDURE — 85025 COMPLETE CBC W/AUTO DIFF WBC: CPT

## 2018-02-06 PROCEDURE — 80053 COMPREHEN METABOLIC PANEL: CPT

## 2018-02-06 PROCEDURE — 82570 ASSAY OF URINE CREATININE: CPT

## 2018-02-06 PROCEDURE — 36415 COLL VENOUS BLD VENIPUNCTURE: CPT

## 2018-02-06 PROCEDURE — 84100 ASSAY OF PHOSPHORUS: CPT

## 2018-02-06 PROCEDURE — 84156 ASSAY OF PROTEIN URINE: CPT

## 2018-02-06 PROCEDURE — 84165 PROTEIN E-PHORESIS SERUM: CPT

## 2018-02-06 PROCEDURE — 81001 URINALYSIS AUTO W/SCOPE: CPT

## 2018-02-06 NOTE — TELEPHONE ENCOUNTER
Called patient. Let her know Dr. Nichole, who is a partner of Dr. Brunner who did per scope, called Dr. Alvarez back. They will plan to do a repeat egd this Friday. She will need to stop Imbruvica today. Their office will be contacting her with apt details and location of their office.

## 2018-02-06 NOTE — PROGRESS NOTES
PROBLEM LIST:  1. Waldenstrom's macroglobulinemia:  a) Splenomegaly was noted on a CT of the chest on 06/06/2015 done for weight  loss, decreased appetite and left upper quadrant pain.   b) Lab evaluation on 06/16/2015 showed an anemia with hemoglobin 10.2,  thrombocytopenia with platelets 116, a 0.6 g/dL IgM lambda monoclonal protein  was identified on SPEP, beta-2 microglobulin was 10.4, . 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   c) Treatment with Ibrutinib was started 07/2015.   2. Hypertension.   3. Essential tremor.   4. Scoliosis.     Subjective     HISTORY OF PRESENT ILLNESS:   Ms. Yusuf returns for two-month follow-up.  She is here after admission last week for abdominal pain.  She had an EGD and biopsy.  Her pain improved while in the hospital. She says that she still feels sore in the upper abdomen but it is not nearly as bad as it was last week.  She is able to control the pain with 2 extra strength Tylenol per day.  She has Percocet at home but does not like taking it.  She says for the past month her energy level has just been a lot lower.    She was sent home from the hospital on oxygen but does not really feel like she needs it.  She has been checking her oxygen level at home and says it ranges between 94 and 97%    Past Medical History, Past Surgical History, Social History, Family History have been reviewed and are without significant changes except as mentioned.    Review of Systems   A comprehensive 14 point review of systems was performed and was negative except as mentioned.    Medications:  The current medication list was reviewed in the EMR    ALLERGIES:    Allergies   Allergen Reactions   • Bactrim [Sulfamethoxazole-Trimethoprim] Other (See Comments)     unknown   • Sulfa Antibiotics Other (See Comments)     unknown   • Vancomycin Other (See Comments)  "    unknown   • Zosyn [Piperacillin Sod-Tazobactam So] Other (See Comments)     unknown       Objective      BP (!) 182/75 Comment: pt nervous this morning  Pulse 58  Temp 97.7 °F (36.5 °C) (Temporal Artery )   Resp 18  Ht 157.5 cm (62.01\")  Wt 62.5 kg (137 lb 12.8 oz)  SpO2 96%  BMI 25.2 kg/m2     Performance Status: 1    General: well appearing female in no acute distress  Neuro: alert and oriented  HEENT: sclera anicteric, oropharynx clear  Lymphatics: no cervical, supraclavicular, or axillary adenopathy  Cardiovascular: regular rate and rhythm, no murmurs  Lungs: clear to auscultation bilaterally  Abdomen: Upper abdominal tenderness to palpation.  Extremeties: trace ankle edema bilaterally  Skin: no rashes  Psych: mood and affect appropriate      RECENT LABS:  IgM 136, M spike 0.1 g/dl on 1/29/18                    Assessment/Plan   Carin Yusuf is a 77 y.o. female with Waldenström's macroglobulinemia who has been on imbruvica for about 2 1/2 years with stable disease.  Recent imaging showed stable splenomegaly, but new gastric antral thickening and on EGD she had a large gastric fold that by appearance was worrisome for involvement with lymphoma.    Biopsies of this area showed only hyperplastic gastric mucosa and no evidence of lymphoma.    She does not have other evidence of disease progression, with a low stable IgM and M spike, and no other adenopathy.  At this point it is unclear if her gastric thickening is related to lymphoma or some other cause.  It is possible that lymphoma involvement may be deeper in the wall and missed on the recent biopsies.If this is the case then we will likely have to think about a different treatment other than ibrutinib.  She may potentially benefit from single agent Rituxan.  However, I do not want to subject her to the potential side effects of chemotherapy or radiation without a clear pathologic diagnosis.      I will plan to talk to Dr. Mann about his " impressions and how we could potentially try to repeat a biopsy to get a definite diagnosis.  I will schedule her to return in 3 weeks.      We did check her O2 levels with ambulation in the office today - she was mostly in the 90s on room air, but got down to 88%.  She has already paid for O2 at home through the end of the month.  I told her to check her o2 sats during activity/exertion.  If she remains mostly in the 90s she may not need home O2.                    Visit time was 25 minutes, greater than 50% spent in counseling      Kasey Alvarez MD  [facility]    2/6/2018          CC:

## 2018-02-07 LAB
ALBUMIN SERPL-MCNC: 3.7 G/DL (ref 2.9–4.4)
ALBUMIN/GLOB SERPL: 1.7 {RATIO} (ref 0.7–1.7)
ALPHA1 GLOB FLD ELPH-MCNC: 0.3 G/DL (ref 0–0.4)
ALPHA2 GLOB SERPL ELPH-MCNC: 0.8 G/DL (ref 0.4–1)
B-GLOBULIN SERPL ELPH-MCNC: 0.8 G/DL (ref 0.7–1.3)
GAMMA GLOB SERPL ELPH-MCNC: 0.5 G/DL (ref 0.4–1.8)
GLOBULIN SER CALC-MCNC: 2.3 G/DL (ref 2.2–3.9)
IGA SERPL-MCNC: 18 MG/DL (ref 64–422)
IGG SERPL-MCNC: 347 MG/DL (ref 700–1600)
IGM SERPL-MCNC: 203 MG/DL (ref 26–217)
INTERPRETATION SERPL IEP-IMP: ABNORMAL
Lab: ABNORMAL
M-SPIKE: 0.1 G/DL
PROT SERPL-MCNC: 6 G/DL (ref 6–8.5)

## 2018-02-08 ENCOUNTER — OFFICE VISIT (OUTPATIENT)
Dept: FAMILY MEDICINE CLINIC | Facility: CLINIC | Age: 78
End: 2018-02-08

## 2018-02-08 ENCOUNTER — ANESTHESIA EVENT (OUTPATIENT)
Dept: GASTROENTEROLOGY | Facility: HOSPITAL | Age: 78
End: 2018-02-08

## 2018-02-08 VITALS
WEIGHT: 140 LBS | HEART RATE: 60 BPM | BODY MASS INDEX: 25.76 KG/M2 | RESPIRATION RATE: 16 BRPM | SYSTOLIC BLOOD PRESSURE: 128 MMHG | HEIGHT: 62 IN | OXYGEN SATURATION: 97 % | DIASTOLIC BLOOD PRESSURE: 72 MMHG

## 2018-02-08 DIAGNOSIS — R16.1 SPLENOMEGALY: ICD-10-CM

## 2018-02-08 DIAGNOSIS — K31.89 GASTRIC WALL THICKENING: Primary | ICD-10-CM

## 2018-02-08 DIAGNOSIS — I10 ESSENTIAL HYPERTENSION: Primary | ICD-10-CM

## 2018-02-08 DIAGNOSIS — R10.10 PAIN OF UPPER ABDOMEN: ICD-10-CM

## 2018-02-08 PROCEDURE — 99213 OFFICE O/P EST LOW 20 MIN: CPT | Performed by: FAMILY MEDICINE

## 2018-02-08 RX ORDER — FAMOTIDINE 20 MG/1
20 TABLET, FILM COATED ORAL ONCE
Status: CANCELLED | OUTPATIENT
Start: 2018-02-08 | End: 2018-02-08

## 2018-02-08 RX ORDER — ATENOLOL 50 MG/1
100 TABLET ORAL DAILY
COMMUNITY
Start: 2018-02-05 | End: 2018-05-08 | Stop reason: SDUPTHER

## 2018-02-08 NOTE — PROGRESS NOTES
Subjective   Carin Yusuf is a 77 y.o. female.     Hypertension   This is a chronic problem. The problem is unchanged. The problem is controlled. Pertinent negatives include no chest pain, headaches, malaise/fatigue, palpitations, peripheral edema or shortness of breath. Risk factors for coronary artery disease include dyslipidemia, post-menopausal state and sedentary lifestyle. Past treatments include beta blockers, ACE inhibitors and diuretics. The current treatment provides significant improvement. There are no compliance problems.  There is no history of angina, kidney disease or CAD/MI.   Abdominal Pain   This is a recurrent (Recent hospitalization followed by an outpatient endoscopy no specific diagnosis so is having another end endoscopy tomorrow) problem. The current episode started more than 1 month ago. The pain is located in the LUQ. The pain is mild. The quality of the pain is dull. The abdominal pain does not radiate. Associated symptoms include anorexia and arthralgias. Pertinent negatives include no diarrhea, dysuria, headaches, hematuria, nausea or vomiting. Associated symptoms comments: Is eating some better and is gaining some weight back but finds that she needs to eat slowly. Nothing aggravates the pain. The pain is relieved by nothing. The treatment provided moderate relief. Prior diagnostic workup includes upper endoscopy and CT scan.        The following portions of the patient's history were reviewed and updated as appropriate: allergies, current medications, past social history and problem list.    Review of Systems   Constitutional: Negative for fatigue, malaise/fatigue and unexpected weight change.   HENT: Negative for congestion and sinus pressure.    Respiratory: Negative for cough, chest tightness and shortness of breath.    Cardiovascular: Negative for chest pain, palpitations and leg swelling.   Gastrointestinal: Positive for abdominal pain and anorexia. Negative for diarrhea,  "nausea and vomiting.   Genitourinary: Negative for dysuria and hematuria.   Musculoskeletal: Positive for arthralgias. Negative for joint swelling.   Skin: Negative for color change and rash.   Neurological: Positive for tremors. Negative for dizziness, syncope, weakness and headaches.       Objective   /72  Pulse 60  Resp 16  Ht 157.5 cm (62\")  Wt 63.5 kg (140 lb)  SpO2 97%  BMI 25.61 kg/m2  Physical Exam   Constitutional: She is oriented to person, place, and time. She appears well-developed and well-nourished. She is cooperative.   HENT:   Head: Normocephalic.   Right Ear: External ear normal.   Left Ear: External ear normal.   Nose: Nose normal.   Mouth/Throat: Oropharynx is clear and moist.   Eyes: Conjunctivae are normal. Pupils are equal, round, and reactive to light. No scleral icterus.   Neck: Neck supple. Carotid bruit is not present. No thyromegaly present.   Cardiovascular: Normal rate, regular rhythm and normal heart sounds.    Pulmonary/Chest: Effort normal and breath sounds normal. She has no wheezes. She has no rales.   Abdominal: Bowel sounds are normal. There is no hepatosplenomegaly.   Musculoskeletal: Normal range of motion.   Neurological: She is alert and oriented to person, place, and time.   No focal deficits no lateralizing signs   Skin: Skin is warm and dry. No rash noted.   Psychiatric: She has a normal mood and affect. Cognition and memory are normal.   Nursing note and vitals reviewed.      Assessment/Plan   Problem List Items Addressed This Visit     Essential hypertension - Primary    Relevant Medications    atenolol (TENORMIN) 50 MG tablet    Splenomegaly      Other Visit Diagnoses     Pain of upper abdomen              New Medications Ordered This Visit   Medications   • atenolol (TENORMIN) 50 MG tablet   Agree with repeat endoscopy courage to eat smaller but more frequent meals.  Keep follow-up visit with me in the latter part of March.       "

## 2018-02-09 ENCOUNTER — HOSPITAL ENCOUNTER (OUTPATIENT)
Facility: HOSPITAL | Age: 78
Setting detail: HOSPITAL OUTPATIENT SURGERY
Discharge: HOME | End: 2018-02-09
Attending: INTERNAL MEDICINE | Admitting: INTERNAL MEDICINE

## 2018-02-09 ENCOUNTER — ANESTHESIA (OUTPATIENT)
Dept: GASTROENTEROLOGY | Facility: HOSPITAL | Age: 78
End: 2018-02-09

## 2018-02-09 VITALS
RESPIRATION RATE: 16 BRPM | OXYGEN SATURATION: 96 % | HEART RATE: 71 BPM | TEMPERATURE: 98.1 F | DIASTOLIC BLOOD PRESSURE: 73 MMHG | SYSTOLIC BLOOD PRESSURE: 155 MMHG

## 2018-02-09 DIAGNOSIS — K31.89 GASTRIC WALL THICKENING: ICD-10-CM

## 2018-02-09 PROCEDURE — 25010000002 ONDANSETRON PER 1 MG: Performed by: NURSE ANESTHETIST, CERTIFIED REGISTERED

## 2018-02-09 PROCEDURE — 88184 FLOWCYTOMETRY/ TC 1 MARKER: CPT

## 2018-02-09 PROCEDURE — 25010000002 DEXAMETHASONE PER 1 MG: Performed by: NURSE ANESTHETIST, CERTIFIED REGISTERED

## 2018-02-09 PROCEDURE — 88185 FLOWCYTOMETRY/TC ADD-ON: CPT

## 2018-02-09 PROCEDURE — 88334 PATH CONSLTJ SURG CYTO XM EA: CPT | Performed by: INTERNAL MEDICINE

## 2018-02-09 PROCEDURE — 88305 TISSUE EXAM BY PATHOLOGIST: CPT | Performed by: INTERNAL MEDICINE

## 2018-02-09 PROCEDURE — 25010000002 PROPOFOL 10 MG/ML EMULSION: Performed by: NURSE ANESTHETIST, CERTIFIED REGISTERED

## 2018-02-09 PROCEDURE — 25010000002 NEOSTIGMINE PER 0.5 MG: Performed by: NURSE ANESTHETIST, CERTIFIED REGISTERED

## 2018-02-09 RX ORDER — SODIUM CHLORIDE, SODIUM LACTATE, POTASSIUM CHLORIDE, CALCIUM CHLORIDE 600; 310; 30; 20 MG/100ML; MG/100ML; MG/100ML; MG/100ML
9 INJECTION, SOLUTION INTRAVENOUS CONTINUOUS
Status: DISCONTINUED | OUTPATIENT
Start: 2018-02-09 | End: 2018-02-09 | Stop reason: HOSPADM

## 2018-02-09 RX ORDER — GLYCOPYRROLATE 0.2 MG/ML
INJECTION INTRAMUSCULAR; INTRAVENOUS AS NEEDED
Status: DISCONTINUED | OUTPATIENT
Start: 2018-02-09 | End: 2018-02-09 | Stop reason: SURG

## 2018-02-09 RX ORDER — LIDOCAINE HYDROCHLORIDE 10 MG/ML
0.5 INJECTION, SOLUTION EPIDURAL; INFILTRATION; INTRACAUDAL; PERINEURAL ONCE AS NEEDED
Status: DISCONTINUED | OUTPATIENT
Start: 2018-02-09 | End: 2018-02-09 | Stop reason: HOSPADM

## 2018-02-09 RX ORDER — ONDANSETRON 2 MG/ML
INJECTION INTRAMUSCULAR; INTRAVENOUS AS NEEDED
Status: DISCONTINUED | OUTPATIENT
Start: 2018-02-09 | End: 2018-02-09 | Stop reason: SURG

## 2018-02-09 RX ORDER — SODIUM CHLORIDE 0.9 % (FLUSH) 0.9 %
1-10 SYRINGE (ML) INJECTION AS NEEDED
Status: DISCONTINUED | OUTPATIENT
Start: 2018-02-09 | End: 2018-02-09 | Stop reason: HOSPADM

## 2018-02-09 RX ORDER — DEXAMETHASONE SODIUM PHOSPHATE 4 MG/ML
INJECTION, SOLUTION INTRA-ARTICULAR; INTRALESIONAL; INTRAMUSCULAR; INTRAVENOUS; SOFT TISSUE AS NEEDED
Status: DISCONTINUED | OUTPATIENT
Start: 2018-02-09 | End: 2018-02-09 | Stop reason: SURG

## 2018-02-09 RX ORDER — FAMOTIDINE 10 MG/ML
20 INJECTION, SOLUTION INTRAVENOUS ONCE
Status: COMPLETED | OUTPATIENT
Start: 2018-02-09 | End: 2018-02-09

## 2018-02-09 RX ORDER — PROPOFOL 10 MG/ML
VIAL (ML) INTRAVENOUS AS NEEDED
Status: DISCONTINUED | OUTPATIENT
Start: 2018-02-09 | End: 2018-02-09 | Stop reason: SURG

## 2018-02-09 RX ORDER — ATRACURIUM BESYLATE 10 MG/ML
INJECTION, SOLUTION INTRAVENOUS AS NEEDED
Status: DISCONTINUED | OUTPATIENT
Start: 2018-02-09 | End: 2018-02-09 | Stop reason: SURG

## 2018-02-09 RX ORDER — LIDOCAINE HYDROCHLORIDE 10 MG/ML
INJECTION, SOLUTION INFILTRATION; PERINEURAL AS NEEDED
Status: DISCONTINUED | OUTPATIENT
Start: 2018-02-09 | End: 2018-02-09 | Stop reason: SURG

## 2018-02-09 RX ORDER — ONDANSETRON 2 MG/ML
4 INJECTION INTRAMUSCULAR; INTRAVENOUS ONCE AS NEEDED
Status: DISCONTINUED | OUTPATIENT
Start: 2018-02-09 | End: 2018-02-09 | Stop reason: HOSPADM

## 2018-02-09 RX ORDER — SODIUM CHLORIDE 9 MG/ML
9 INJECTION, SOLUTION INTRAVENOUS CONTINUOUS
Status: DISCONTINUED | OUTPATIENT
Start: 2018-02-09 | End: 2018-02-09 | Stop reason: HOSPADM

## 2018-02-09 RX ADMIN — Medication 2 MG: at 11:55

## 2018-02-09 RX ADMIN — EPHEDRINE SULFATE 5 MG: 50 INJECTION INTRAMUSCULAR; INTRAVENOUS; SUBCUTANEOUS at 11:13

## 2018-02-09 RX ADMIN — GLYCOPYRROLATE 0.4 MG: 0.2 INJECTION, SOLUTION INTRAMUSCULAR; INTRAVENOUS at 11:55

## 2018-02-09 RX ADMIN — PROPOFOL 140 MG: 10 INJECTION, EMULSION INTRAVENOUS at 10:49

## 2018-02-09 RX ADMIN — ATRACURIUM BESYLATE 30 MG: 10 INJECTION, SOLUTION INTRAVENOUS at 10:49

## 2018-02-09 RX ADMIN — GLYCOPYRROLATE 0.2 MG: 0.2 INJECTION, SOLUTION INTRAMUSCULAR; INTRAVENOUS at 11:10

## 2018-02-09 RX ADMIN — SODIUM CHLORIDE, POTASSIUM CHLORIDE, SODIUM LACTATE AND CALCIUM CHLORIDE: 600; 310; 30; 20 INJECTION, SOLUTION INTRAVENOUS at 09:13

## 2018-02-09 RX ADMIN — EPHEDRINE SULFATE 10 MG: 50 INJECTION INTRAMUSCULAR; INTRAVENOUS; SUBCUTANEOUS at 11:12

## 2018-02-09 RX ADMIN — EPHEDRINE SULFATE 5 MG: 50 INJECTION INTRAMUSCULAR; INTRAVENOUS; SUBCUTANEOUS at 11:15

## 2018-02-09 RX ADMIN — FAMOTIDINE 20 MG: 10 INJECTION INTRAVENOUS at 09:01

## 2018-02-09 RX ADMIN — ONDANSETRON 4 MG: 2 INJECTION INTRAMUSCULAR; INTRAVENOUS at 11:02

## 2018-02-09 RX ADMIN — LIDOCAINE HYDROCHLORIDE 80 MG: 10 INJECTION, SOLUTION INFILTRATION; PERINEURAL at 10:49

## 2018-02-09 RX ADMIN — DEXAMETHASONE SODIUM PHOSPHATE 4 MG: 4 INJECTION, SOLUTION INTRAMUSCULAR; INTRAVENOUS at 11:02

## 2018-02-09 RX ADMIN — SODIUM CHLORIDE 9 ML/HR: 9 INJECTION, SOLUTION INTRAVENOUS at 09:04

## 2018-02-09 NOTE — ANESTHESIA POSTPROCEDURE EVALUATION
Patient: Carin Yusuf    Procedure Summary     Date Anesthesia Start Anesthesia Stop Room / Location    02/09/18 1047   [facility]       Procedure Diagnosis Surgeon Provider    ENDOSCOPIC ULTRASOUND (UPPER) (N/A ); ESOPHAGOGASTRODUODENOSCOPY (N/A Esophagus) Gastric wall thickening  (Gastric wall thickening [K31.89]) MD Camron Byrd MD          Anesthesia Type: general  Last vitals  BP   179/69 (02/09/18 0843)   Temp   96.3 °F (35.7 °C) (02/09/18 0843)   Pulse   55 (02/09/18 0843)   Resp   24 (02/09/18 0843)     SpO2   94 % (02/09/18 0843)     Post Anesthesia Care and Evaluation    Patient location during evaluation: PACU  Patient participation: complete - patient participated  Level of consciousness: awake and alert  Pain score: 0  Pain management: adequate  Airway patency: patent  Anesthetic complications: No anesthetic complications  PONV Status: none  Cardiovascular status: hemodynamically stable and acceptable  Respiratory status: nonlabored ventilation, acceptable and nasal cannula  Hydration status: acceptable

## 2018-02-09 NOTE — H&P (VIEW-ONLY)
[facility]    Referring Provider: Angel Donohue MD   PCP: Hilario Vasques MD    Reason for Consultation: Abdominal pain     Chief complaint: Abdominal pain     History of present illness:    Carin Yusuf is a 77 y.o. female who is admitted with abdominal pain.  She reports persistent left upper quadrant pain for one month.  Pain is described as sharp, severe discomfort radiating to the epigastric region.  It is unrelated to meals.  She has associated nausea but no vomiting nor change in appetite.  Symptoms acutely worsened yesterday.  Pain relieved by IV Morphine.    She denies any recent weight loss.  She has a history of Waldenstrom's macroglobulinemia followed by Dr. Martinez and is on Imbruvica.  Her CT abdomen showed slightly worsening splenomegaly (15 x 7.5 cm and was previously 14 x 6.5 cm) and gastric antral thickening.    She denies any NSAID use.  She recently completed doxycycline for bronchitis.  She denies dysphagia nor odynophagia.  She reports normal bowel movements.      Allergies:  Bactrim [sulfamethoxazole-trimethoprim]; Sulfa antibiotics; Vancomycin; and Zosyn [piperacillin sod-tazobactam so]    Scheduled Meds:    amLODIPine 5 mg Oral Daily   atenolol 100 mg Oral Q24H   budesonide 0.5 mg Nebulization BID - RT   losartan 100 mg Oral Daily   montelukast 10 mg Oral Nightly   pantoprazole 40 mg Oral Q AM        Infusions:    sodium chloride 75 mL/hr Last Rate: 75 mL/hr (01/29/18 0640)       PRN Meds:  •  acetaminophen  •  albuterol  •  fluticasone  •  Morphine  •  ondansetron **OR** ondansetron  •  sodium chloride    Home Meds:  Prescriptions Prior to Admission   Medication Sig Dispense Refill Last Dose   • albuterol (PROAIR HFA) 108 (90 BASE) MCG/ACT inhaler Inhale 2 puffs Every 4 (Four) Hours As Needed for Wheezing. 1 inhaler 2 Taking   • albuterol (PROVENTIL) (2.5 MG/3ML) 0.083% nebulizer solution Use one  Vial via nebulizer by mouth every 8 hours as needed for wheezing 225  mL 0 Taking   • amLODIPine (NORVASC) 5 MG tablet Take 1 tablet by mouth Daily. 30 tablet 0 Taking   • atenolol (TENORMIN) 100 MG tablet take 1 tablet by mouth once daily 90 tablet 0 Taking   • Azelastine-Fluticasone (DYMISTA NA) into each nostril.   Taking   • RAYSA ASPIRIN PO Take 81 mg by mouth daily.   Taking   • benzonatate (TESSALON PERLES) 100 MG capsule Take 1 capsule by mouth 3 (Three) Times a Day As Needed for Cough. 50 capsule 1    • Calcium-Vitamin D-Vitamin K (VIACTIV PO) Take 1 tablet by mouth daily.   Taking   • chlorthalidone (HYGROTON) 25 MG tablet Take 25 mg by mouth Daily. Take 1/2 daily .   Taking   • doxycycline (VIBRAMYCIN) 100 MG capsule Take 1 capsule by mouth 2 (Two) Times a Day. 20 capsule 1    • furosemide (LASIX) 20 MG tablet 1 or 2 qd as directed 60 tablet 5 Taking   • ibrutinib (IMBRUVICA) 140 MG capsule capsule Take 2 capsules by mouth Every Night. 60 capsule 5 Taking   • losartan (COZAAR) 100 MG tablet Take 1 tablet by mouth Daily. 90 tablet 1 Taking   • montelukast (SINGULAIR) 10 MG tablet Take 1 tablet by mouth Every Night. 90 tablet 1 Taking   • Multiple Vitamins-Minerals (MULTIVITAMIN ADULT PO) Take 1 tablet by mouth Daily.   Taking   • Fluticasone Propionate, Inhal, 100 MCG/BLIST aerosol powder  Inhale 1 puff 2 (Two) Times a Day. 60 each 3        ROS: Review of Systems   Constitutional: Positive for fatigue. Negative for chills, fever and unexpected weight change.   HENT: Negative.    Eyes: Negative.    Respiratory: Negative.    Cardiovascular: Negative.    Gastrointestinal: Positive for abdominal pain and nausea. Negative for constipation, diarrhea and vomiting.   Endocrine: Negative.    Genitourinary: Negative.    Skin: Negative.    Neurological: Negative.    Hematological: Negative.    Psychiatric/Behavioral: Negative.        PAST MED HX:  Past Medical History:   Diagnosis Date   • Abdominal pressure    • Abnormal laboratory test    • Allergic rhinitis    • Asthma    • Chronic  "kidney disease    • Encounter for screening for cardiovascular disorders    • Essential tremor    • Hypertension    • Lymphoma    • Pneumonia     Cough   • Scoliosis    • Tendonitis        PAST SURG HX:  Past Surgical History:   Procedure Laterality Date   • BACK SURGERY      1953   • HEMORRHOIDECTOMY     • HYSTERECTOMY         FAM HX:  Family History   Problem Relation Age of Onset   • Stroke Sister    • Stroke Brother    • Hypertension Son    • Esophageal cancer Sister    • Heart attack Mother    • Heart attack Father        SOC HX:  Social History     Social History   • Marital status:      Spouse name: N/A   • Number of children: N/A   • Years of education: N/A     Occupational History   • Not on file.     Social History Main Topics   • Smoking status: Never Smoker   • Smokeless tobacco: Never Used   • Alcohol use No   • Drug use: No   • Sexual activity: Defer     Other Topics Concern   • Not on file     Social History Narrative    Carin Yusuf is a 77 year old white female. She lives with her son in [facility].       PHYSICAL EXAM  BP (!) 181/63 (BP Location: Right arm, Patient Position: Lying)  Pulse 50  Temp 97.8 °F (36.6 °C) (Oral)   Resp 18  Ht 157.5 cm (62\")  Wt 63.6 kg (140 lb 3.2 oz)  SpO2 100%  BMI 25.64 kg/m2  Wt Readings from Last 3 Encounters:   01/29/18 63.6 kg (140 lb 3.2 oz)   12/27/17 64.1 kg (141 lb 6.4 oz)   12/18/17 64.2 kg (141 lb 9.6 oz)   ,body mass index is 25.64 kg/(m^2).  Physical Exam   Constitutional: She is oriented to person, place, and time. She appears well-developed and well-nourished. No distress.   Neck: Normal range of motion.   Cardiovascular: Normal rate and regular rhythm.    Pulmonary/Chest: Effort normal. No respiratory distress.   Abdominal: Soft. Bowel sounds are normal. She exhibits no distension.   Mild tenderness to palpation of the LUQ.  No guarding or rebound.     Musculoskeletal: She exhibits no edema.   Neurological: She is alert and oriented " to person, place, and time.   Skin: Skin is warm and dry. She is not diaphoretic.   Psychiatric: She has a normal mood and affect. Her behavior is normal.         Results Review:   I reviewed the patient's new clinical results.    Lab Results   Component Value Date    WBC 6.63 01/29/2018    HGB 13.1 01/29/2018    HGB 13.1 11/08/2017    HGB 12.0 10/09/2017    HCT 41.7 01/29/2018    MCV 83.1 01/29/2018     (L) 01/29/2018       Lab Results   Component Value Date    INR 0.99 08/21/2016    INR 1.08 08/10/2015       Lab Results   Component Value Date    GLUCOSE 101 (H) 01/29/2018    BUN 16 01/29/2018    CREATININE 1.60 (H) 01/29/2018    EGFRIFNONA 31 (L) 01/29/2018    BCR 10.0 01/29/2018    CO2 32.0 (H) 01/29/2018    CALCIUM 9.2 01/29/2018    PROTENTOTREF 6.3 11/08/2017    ALBUMIN 4.10 01/29/2018    AST 15 01/29/2018    ALT 5 (L) 01/29/2018         ASSESSMENTS/PLANS    1. Left upper quadrant pain   2. Abnormal CT scan showing gastric antral thickening   3. Splenomegaly  4. Waldenstrom's macroglobulinemia     >>> LUQ pain possibly secondary to splenomegaly.   CT does reveal some gastric antral thickening.  Recommend EGD today to further evaluate.      I discussed the patients findings and my recommendations with patient    PASCUAL Ocampo  01/29/18  9:01 AM

## 2018-02-09 NOTE — ANESTHESIA PROCEDURE NOTES
Airway  Urgency: elective    Airway not difficult    General Information and Staff    Patient location during procedure: OR  CRNA: MILLI LÓPEZ    Indications and Patient Condition  Indications for airway management: airway protection    Preoxygenated: yes  MILS not maintained throughout  Mask difficulty assessment: 1 - vent by mask    Final Airway Details  Final airway type: endotracheal airway      Successful airway: ETT  Cuffed: yes   Successful intubation technique: direct laryngoscopy  Facilitating devices/methods: cricoid pressure and intubating stylet  Endotracheal tube insertion site: oral  Blade: Amanda  Blade size: #3  ETT size: 6.5 mm  Cormack-Lehane Classification: grade IIa - partial view of glottis  Placement verified by: chest auscultation and capnometry   Measured from: lips  ETT to lips (cm): 20  Number of attempts at approach: 1    Additional Comments  Negative epigastric sounds, Breath sound equal bilaterally with symmetric chest rise and fall

## 2018-02-09 NOTE — ANESTHESIA PREPROCEDURE EVALUATION
Anesthesia Evaluation                  Airway   Mallampati: II  Dental      Pulmonary    (+) asthma,   Cardiovascular     (+) hypertension,       Neuro/Psych  GI/Hepatic/Renal/Endo      Musculoskeletal     Abdominal    Substance History      OB/GYN          Other                      Anesthesia Plan    ASA 3     general     intravenous induction   Anesthetic plan and risks discussed with patient.    Plan discussed with CRNA.

## 2018-02-13 LAB
CYTO UR: NORMAL
LAB AP CASE REPORT: NORMAL
LAB AP CLINICAL INFORMATION: NORMAL
LAB AP FLOW CYTOMETRY SUMMARY: NORMAL
Lab: NORMAL
Lab: NORMAL
PATH REPORT.FINAL DX SPEC: NORMAL
PATH REPORT.GROSS SPEC: NORMAL

## 2018-02-14 ENCOUNTER — EPISODE CHANGES (OUTPATIENT)
Dept: CASE MANAGEMENT | Facility: OTHER | Age: 78
End: 2018-02-14

## 2018-02-14 PROCEDURE — 88185 FLOWCYTOMETRY/TC ADD-ON: CPT

## 2018-02-14 PROCEDURE — 88184 FLOWCYTOMETRY/ TC 1 MARKER: CPT

## 2018-02-15 ENCOUNTER — TELEPHONE (OUTPATIENT)
Dept: GASTROENTEROLOGY | Facility: CLINIC | Age: 78
End: 2018-02-15

## 2018-02-22 ENCOUNTER — TELEPHONE (OUTPATIENT)
Dept: GASTROENTEROLOGY | Facility: CLINIC | Age: 78
End: 2018-02-22

## 2018-02-22 NOTE — TELEPHONE ENCOUNTER
I called and spoke with Ms. Yusuf regarding the pathology report.  There is no evidence of any dysplasia or malignancy.  She denies any nausea or vomiting.  The patient states that she does experience some acid reflux.  I  recommend to follow-up with regular endoscopy later this summer. She is going to see Dr. Alvarez next week.

## 2018-02-28 ENCOUNTER — OFFICE VISIT (OUTPATIENT)
Dept: ONCOLOGY | Facility: CLINIC | Age: 78
End: 2018-02-28

## 2018-02-28 VITALS
WEIGHT: 139 LBS | DIASTOLIC BLOOD PRESSURE: 67 MMHG | SYSTOLIC BLOOD PRESSURE: 163 MMHG | TEMPERATURE: 97.7 F | HEART RATE: 56 BPM | BODY MASS INDEX: 25.58 KG/M2 | HEIGHT: 62 IN | RESPIRATION RATE: 16 BRPM

## 2018-02-28 DIAGNOSIS — C88.0 WALDENSTROM MACROGLOBULINEMIA (HCC): Primary | Chronic | ICD-10-CM

## 2018-02-28 PROCEDURE — 99214 OFFICE O/P EST MOD 30 MIN: CPT | Performed by: INTERNAL MEDICINE

## 2018-02-28 NOTE — PROGRESS NOTES
"  PROBLEM LIST:  1. Waldenstrom's macroglobulinemia:  a) Splenomegaly was noted on a CT of the chest on 06/06/2015 done for weight  loss, decreased appetite and left upper quadrant pain.   b) Lab evaluation on 06/16/2015 showed an anemia with hemoglobin 10.2,  thrombocytopenia with platelets 116, a 0.6 g/dL IgM lambda monoclonal protein  was identified on SPEP, beta-2 microglobulin was 10.4, . 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   c) Treatment with Ibrutinib was started 07/2015.   2. Hypertension.   3. Essential tremor.   4. Scoliosis.     Subjective     HISTORY OF PRESENT ILLNESS:   Ms. Yusuf returns for follow-up.  Since she was last here she had a endoscopic ultrasound with biopsy.  The biopsies again showed only benign findings.  She has not had as much stomach pain recently.  When she does she takes Tylenol and this helps.  She has not taken Tylenol at all this week.    Past Medical History, Past Surgical History, Social History, Family History have been reviewed and are without significant changes except as mentioned.    Review of Systems   A comprehensive 14 point review of systems was performed and was negative except as mentioned.    Medications:  The current medication list was reviewed in the EMR    ALLERGIES:    Allergies   Allergen Reactions   • Bactrim [Sulfamethoxazole-Trimethoprim] Other (See Comments)     unknown   • Sulfa Antibiotics Other (See Comments)     unknown   • Vancomycin Other (See Comments)     unknown   • Zosyn [Piperacillin Sod-Tazobactam So] Other (See Comments)     unknown       Objective      /67  Pulse 56  Temp 97.7 °F (36.5 °C)  Resp 16  Ht 157.5 cm (62\")  Wt 63 kg (139 lb)  BMI 25.42 kg/m2     Performance Status: 1    General: well appearing female in no acute distress  Neuro: alert and oriented  HEENT: sclera anicteric, " oropharynx clear  Lymphatics: no cervical, supraclavicular, or axillary adenopathy  Cardiovascular: regular rate and rhythm, no murmurs  Lungs: clear to auscultation bilaterally  Abdomen: Upper abdominal tenderness to palpation.  Extremeties: trace ankle edema bilaterally  Skin: no rashes  Psych: mood and affect appropriate      RECENT LABS:  On 2/6/2018 M spike is 0.1 g, CBC within normal limits                    Assessment/Plan   Carin Yusuf is a 77 y.o. female with Waldenström's macroglobulinemia who has been on imbruvica with stable disease.  She has now had 2 biopsies of gastric thickening with giant folds.  Both of these are benign.  Her stomach pain is mild at this point and she has not had any severe attacks like the one that initially landed her in the hospital.  At this point we will plan to continue improve with an monitor her symptoms.  She is reluctant to have another endoscopy.  If she does not have any more severe symptoms I think she does not need further workup for the time being.  I do think the recommendation from Dr. Estrella for a repeat endoscopy later this summer is very reasonable.  For now I will see her back in 2 months with repeat labs.                Visit time was 25 minutes, greater than 50% spent in counseling      Kasey Alvarez MD  [facility] Hematology and Oncology    2/28/2018          CC:

## 2018-03-09 RX ORDER — FUROSEMIDE 20 MG/1
TABLET ORAL
Qty: 60 TABLET | Refills: 5 | Status: SHIPPED | OUTPATIENT
Start: 2018-03-09 | End: 2018-11-12 | Stop reason: SDUPTHER

## 2018-03-09 RX ORDER — AMLODIPINE BESYLATE 5 MG/1
TABLET ORAL
Qty: 90 TABLET | Refills: 0 | Status: SHIPPED | OUTPATIENT
Start: 2018-03-09 | End: 2018-06-01 | Stop reason: SDUPTHER

## 2018-03-21 ENCOUNTER — OFFICE VISIT (OUTPATIENT)
Dept: FAMILY MEDICINE CLINIC | Facility: CLINIC | Age: 78
End: 2018-03-21

## 2018-03-21 VITALS
SYSTOLIC BLOOD PRESSURE: 142 MMHG | OXYGEN SATURATION: 95 % | HEIGHT: 62 IN | HEART RATE: 59 BPM | BODY MASS INDEX: 25.58 KG/M2 | DIASTOLIC BLOOD PRESSURE: 86 MMHG | WEIGHT: 139 LBS | RESPIRATION RATE: 16 BRPM

## 2018-03-21 DIAGNOSIS — I10 ESSENTIAL HYPERTENSION: Primary | ICD-10-CM

## 2018-03-21 DIAGNOSIS — G25.0 HEREDITARY ESSENTIAL TREMOR: Chronic | ICD-10-CM

## 2018-03-21 PROCEDURE — 99213 OFFICE O/P EST LOW 20 MIN: CPT | Performed by: FAMILY MEDICINE

## 2018-03-21 RX ORDER — IBRUTINIB 140 MG/1
TABLET, FILM COATED ORAL
COMMUNITY
Start: 2018-02-28 | End: 2018-03-27

## 2018-03-21 NOTE — PROGRESS NOTES
Subjective   Carin Yusuf is a 77 y.o. female.     Hypertension   This is a chronic (Does not like her new generic amlodipine) problem. The current episode started more than 1 year ago. The problem has been gradually improving since onset. The problem is controlled. Associated symptoms include peripheral edema. Pertinent negatives include no chest pain, headaches, palpitations or shortness of breath. Risk factors for coronary artery disease include post-menopausal state and sedentary lifestyle. Past treatments include calcium channel blockers, beta blockers, diuretics and angiotensin blockers. There are no compliance problems.  There is no history of angina, kidney disease or CAD/MI. Current antihypertension treatment includes calcium channel blockers, beta blockers, diuretics and angiotensin blockers.   Arthritis   Presents for follow-up visit. She complains of stiffness. She reports no joint swelling. The symptoms have been stable. Affected locations include the left shoulder and right shoulder. Pertinent negatives include no diarrhea, dry mouth, dysuria, fatigue, fever, rash or Raynaud's syndrome.        The following portions of the patient's history were reviewed and updated as appropriate: allergies, current medications, past social history and problem list.    Review of Systems   Constitutional: Negative for fatigue, fever and unexpected weight change.   HENT: Negative for congestion and sinus pressure.    Respiratory: Negative for cough, chest tightness and shortness of breath.    Cardiovascular: Negative for chest pain, palpitations and leg swelling.   Gastrointestinal: Negative for diarrhea, nausea and vomiting.   Genitourinary: Negative for dysuria and hematuria.   Musculoskeletal: Positive for arthralgias, arthritis and stiffness. Negative for joint swelling and myalgias.   Skin: Negative for color change and rash.   Neurological: Negative for dizziness, syncope, weakness and headaches.  "  Hematological:        Followed by oncology   Psychiatric/Behavioral:        Worries a lot       Objective   /86   Pulse 59   Resp 16   Ht 157.5 cm (62\")   Wt 63 kg (139 lb)   SpO2 95%   BMI 25.42 kg/m²   Physical Exam   Constitutional: She is oriented to person, place, and time. She appears well-developed and well-nourished. She is cooperative.   HENT:   Head: Normocephalic.   Right Ear: External ear normal.   Left Ear: External ear normal.   Nose: Nose normal.   Mouth/Throat: Oropharynx is clear and moist.   Eyes: Conjunctivae are normal. Pupils are equal, round, and reactive to light. No scleral icterus.   Neck: Neck supple. Carotid bruit is not present. No thyromegaly present.   Cardiovascular: Normal rate, regular rhythm and normal heart sounds.    Pulmonary/Chest: Effort normal and breath sounds normal.   Abdominal: There is no hepatosplenomegaly.   Musculoskeletal: Normal range of motion. She exhibits no edema.   Neurological: She is alert and oriented to person, place, and time.   No focal deficits no lateralizing signs   Skin: Skin is warm and dry. No rash noted.   Psychiatric: She has a normal mood and affect. Cognition and memory are normal.   Nursing note and vitals reviewed.      Assessment/Plan   Problem List Items Addressed This Visit     Hereditary essential tremor (Chronic)    Essential hypertension - Primary      Other Visit Diagnoses    None.         New Medications Ordered This Visit   Medications   • IMBRUVICA 140 MG tablet tablet     May need to consider changing amlodipine to another calcium blocker on return to clinic.         "

## 2018-03-27 ENCOUNTER — SPECIALTY PHARMACY (OUTPATIENT)
Dept: ONCOLOGY | Facility: HOSPITAL | Age: 78
End: 2018-03-27

## 2018-03-27 DIAGNOSIS — C88.0 WALDENSTROM MACROGLOBULINEMIA (HCC): Primary | ICD-10-CM

## 2018-03-27 NOTE — PROGRESS NOTES
Transitioning patient to IBRUTINIB TABLETS as the capsule formulation will be phased out in the coming months. Submitted script for Ibrutinib 280mg tablet - Take one tablet PO daily #28 with 11RF for continuation of treatment. Submitted to [facility]

## 2018-04-05 ENCOUNTER — SPECIALTY PHARMACY (OUTPATIENT)
Dept: ONCOLOGY | Facility: HOSPITAL | Age: 78
End: 2018-04-05

## 2018-04-05 DIAGNOSIS — C88.0 WALDENSTROM MACROGLOBULINEMIA (HCC): ICD-10-CM

## 2018-04-05 DIAGNOSIS — C88.0 WALDENSTROM MACROGLOBULINEMIA (HCC): Primary | ICD-10-CM

## 2018-04-05 NOTE — PROGRESS NOTES
"Received the following Fax from [facility] \" Thank you for sending a new RX of Imbruvica Tablets. We are in need of a Rx for Imbruvica CAPSULES. The patient needs to meet an Out of Pocket amount to obtain assistance from the . We can not assist in this is we do not have a Rx for the Imbruvica Capsules. The day supply cannot be split with the tablet formulation, but can be done with CAPSULE prescriptions due to packaging.\"    Thus, submitted a script for Imbruvica 140mg capsules - take TWO CAPSULES (280mg) by mouth once daily #60 with 0RF to process.    "

## 2018-04-12 ENCOUNTER — TELEPHONE (OUTPATIENT)
Dept: ONCOLOGY | Facility: CLINIC | Age: 78
End: 2018-04-12

## 2018-04-12 NOTE — TELEPHONE ENCOUNTER
----- Message from Benton Ohara sent at 4/12/2018 11:00 AM EDT -----  Regarding: Kim-Clarification  Contact: [phone]  [facility] called and said they need clarification on the script they received. The ext. Is [phone]

## 2018-04-12 NOTE — TELEPHONE ENCOUNTER
Returned call to rep at [employer]. Confirmed dosing for patient is 280mg daily. OK'd 11 refills.

## 2018-04-24 ENCOUNTER — LAB (OUTPATIENT)
Dept: LAB | Facility: HOSPITAL | Age: 78
End: 2018-04-24

## 2018-04-24 ENCOUNTER — OFFICE VISIT (OUTPATIENT)
Dept: ONCOLOGY | Facility: CLINIC | Age: 78
End: 2018-04-24

## 2018-04-24 VITALS
BODY MASS INDEX: 25.76 KG/M2 | RESPIRATION RATE: 16 BRPM | SYSTOLIC BLOOD PRESSURE: 135 MMHG | HEIGHT: 62 IN | HEART RATE: 57 BPM | DIASTOLIC BLOOD PRESSURE: 64 MMHG | TEMPERATURE: 98.2 F | WEIGHT: 140 LBS

## 2018-04-24 DIAGNOSIS — C88.0 WALDENSTROM MACROGLOBULINEMIA (HCC): ICD-10-CM

## 2018-04-24 DIAGNOSIS — C88.0 WALDENSTROM MACROGLOBULINEMIA (HCC): Primary | Chronic | ICD-10-CM

## 2018-04-24 LAB
ALBUMIN SERPL-MCNC: 4.1 G/DL (ref 3.2–4.8)
ALBUMIN/GLOB SERPL: 2.3 G/DL (ref 1.5–2.5)
ALP SERPL-CCNC: 87 U/L (ref 25–100)
ALT SERPL W P-5'-P-CCNC: 4 U/L (ref 7–40)
ANION GAP SERPL CALCULATED.3IONS-SCNC: 3 MMOL/L (ref 3–11)
AST SERPL-CCNC: 15 U/L (ref 0–33)
BILIRUB SERPL-MCNC: 1.1 MG/DL (ref 0.3–1.2)
BUN BLD-MCNC: 23 MG/DL (ref 9–23)
BUN/CREAT SERPL: 13.5 (ref 7–25)
CALCIUM SPEC-SCNC: 9.4 MG/DL (ref 8.7–10.4)
CHLORIDE SERPL-SCNC: 101 MMOL/L (ref 99–109)
CO2 SERPL-SCNC: 35 MMOL/L (ref 20–31)
CREAT BLD-MCNC: 1.7 MG/DL (ref 0.6–1.3)
ERYTHROCYTE [DISTWIDTH] IN BLOOD BY AUTOMATED COUNT: 15.1 % (ref 11.3–14.5)
GFR SERPL CREATININE-BSD FRML MDRD: 29 ML/MIN/1.73
GLOBULIN UR ELPH-MCNC: 1.8 GM/DL
GLUCOSE BLD-MCNC: 95 MG/DL (ref 70–100)
HCT VFR BLD AUTO: 39.3 % (ref 34.5–44)
HGB BLD-MCNC: 12.1 G/DL (ref 11.5–15.5)
LYMPHOCYTES # BLD AUTO: 1.8 10*3/MM3 (ref 0.6–4.8)
LYMPHOCYTES NFR BLD AUTO: 31.3 % (ref 24–44)
MCH RBC QN AUTO: 25.5 PG (ref 27–31)
MCHC RBC AUTO-ENTMCNC: 30.7 G/DL (ref 32–36)
MCV RBC AUTO: 83.2 FL (ref 80–99)
MONOCYTES # BLD AUTO: 0.3 10*3/MM3 (ref 0–1)
MONOCYTES NFR BLD AUTO: 4.7 % (ref 0–12)
NEUTROPHILS # BLD AUTO: 3.6 10*3/MM3 (ref 1.5–8.3)
NEUTROPHILS NFR BLD AUTO: 64 % (ref 41–71)
PLATELET # BLD AUTO: 138 10*3/MM3 (ref 150–450)
PMV BLD AUTO: 8.4 FL (ref 6–12)
POTASSIUM BLD-SCNC: 3.8 MMOL/L (ref 3.5–5.5)
PROT SERPL-MCNC: 5.9 G/DL (ref 5.7–8.2)
RBC # BLD AUTO: 4.72 10*6/MM3 (ref 3.89–5.14)
SODIUM BLD-SCNC: 139 MMOL/L (ref 132–146)
WBC NRBC COR # BLD: 5.7 10*3/MM3 (ref 3.5–10.8)

## 2018-04-24 PROCEDURE — 99213 OFFICE O/P EST LOW 20 MIN: CPT | Performed by: INTERNAL MEDICINE

## 2018-04-24 PROCEDURE — 84165 PROTEIN E-PHORESIS SERUM: CPT

## 2018-04-24 PROCEDURE — 80053 COMPREHEN METABOLIC PANEL: CPT

## 2018-04-24 PROCEDURE — 36415 COLL VENOUS BLD VENIPUNCTURE: CPT

## 2018-04-24 PROCEDURE — 85025 COMPLETE CBC W/AUTO DIFF WBC: CPT

## 2018-04-24 PROCEDURE — 82784 ASSAY IGA/IGD/IGG/IGM EACH: CPT

## 2018-04-24 PROCEDURE — 86334 IMMUNOFIX E-PHORESIS SERUM: CPT

## 2018-04-24 RX ORDER — IBRUTINIB 140 MG/1
TABLET, FILM COATED ORAL
COMMUNITY
Start: 2018-04-07 | End: 2018-06-01 | Stop reason: DRUGHIGH

## 2018-04-24 NOTE — PROGRESS NOTES
"  PROBLEM LIST:  1. Waldenstrom's macroglobulinemia:  a) Splenomegaly was noted on a CT of the chest on 06/06/2015 done for weight  loss, decreased appetite and left upper quadrant pain.   b) Lab evaluation on 06/16/2015 showed an anemia with hemoglobin 10.2,  thrombocytopenia with platelets 116, a 0.6 g/dL IgM lambda monoclonal protein  was identified on SPEP, beta-2 microglobulin was 10.4, . 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   c) Treatment with Ibrutinib was started 07/2015.   2. Hypertension.   3. Essential tremor.   4. Scoliosis.     Subjective     HISTORY OF PRESENT ILLNESS:   Ms. Yusuf returns for follow-up.  Overall she has been feeling ok, but she has had 2 episodes of diarrhea, which she describes as severe and watery.  Both times it lasted all night but then resolved by the morning.    It was associated with some abdominal cramping.  The last episode was about 2 weeks ago.  Other than that, appetite is good and sh ehas not had abdominal pain.     Past Medical History, Past Surgical History, Social History, Family History have been reviewed and are without significant changes except as mentioned.    Review of Systems   A comprehensive 14 point review of systems was performed and was negative except as mentioned.    Medications:  The current medication list was reviewed in the EMR    ALLERGIES:    Allergies   Allergen Reactions   • Bactrim [Sulfamethoxazole-Trimethoprim] Other (See Comments)     unknown   • Sulfa Antibiotics Other (See Comments)     unknown   • Vancomycin Other (See Comments)     unknown   • Zosyn [Piperacillin Sod-Tazobactam So] Other (See Comments)     unknown       Objective      /64   Pulse 57   Temp 98.2 °F (36.8 °C)   Resp 16   Ht 157.5 cm (62\")   Wt 63.5 kg (140 lb)   BMI 25.61 kg/m²      Performance Status: 1    General: well " appearing female in no acute distress  Neuro: alert and oriented  HEENT: sclera anicteric, oropharynx clear  Lymphatics: no cervical, supraclavicular, or axillary adenopathy  Cardiovascular: regular rate and rhythm, no murmurs  Lungs: clear to auscultation bilaterally  Abdomen: Upper abdominal tenderness to palpation.  Extremeties: trace ankle edema bilaterally  Skin: no rashes  Psych: mood and affect appropriate    1 CBC shows a white count of 5.7, hemoglobin 12.1, platelets 138        Assessment/Plan   Carin Yusuf is a 77 y.o. female with Waldenström's macroglobulinemia who has been on imbruvica with stable disease.  Clinically she is stable.  She continues to take Imbruvica 280 mg daily.  We discussed today Imbruvica can be associated with diarrhea, although she has not had that difficulty in almost 3 years of taking it, so I would be surprised if that were the cause of her recent episodes.  We will continue to monitor this.  She continues to have difficulty with diarrhea or abdominal pain, we will consider repeating the CT scan of the abdomen.  Otherwise I will see her back again in 2 months.                Visit time was 15 minutes, greater than 50% spent in counseling      Kasey Alvarez MD  [facility] Hematology and Oncology    4/24/2018          CC:

## 2018-04-25 LAB
ALBUMIN SERPL-MCNC: 3.7 G/DL (ref 2.9–4.4)
ALBUMIN/GLOB SERPL: 1.7 {RATIO} (ref 0.7–1.7)
ALPHA1 GLOB FLD ELPH-MCNC: 0.3 G/DL (ref 0–0.4)
ALPHA2 GLOB SERPL ELPH-MCNC: 0.7 G/DL (ref 0.4–1)
B-GLOBULIN SERPL ELPH-MCNC: 0.7 G/DL (ref 0.7–1.3)
GAMMA GLOB SERPL ELPH-MCNC: 0.5 G/DL (ref 0.4–1.8)
GLOBULIN SER CALC-MCNC: 2.2 G/DL (ref 2.2–3.9)
IGA SERPL-MCNC: 15 MG/DL (ref 64–422)
IGG SERPL-MCNC: 308 MG/DL (ref 700–1600)
IGM SERPL-MCNC: 336 MG/DL (ref 26–217)
INTERPRETATION SERPL IEP-IMP: ABNORMAL
Lab: ABNORMAL
M-SPIKE: 0.2 G/DL
PROT SERPL-MCNC: 5.9 G/DL (ref 6–8.5)

## 2018-05-08 RX ORDER — ATENOLOL 50 MG/1
100 TABLET ORAL DAILY
Qty: 60 TABLET | Refills: 4 | Status: SHIPPED | OUTPATIENT
Start: 2018-05-08 | End: 2018-06-01

## 2018-06-01 ENCOUNTER — OFFICE VISIT (OUTPATIENT)
Dept: FAMILY MEDICINE CLINIC | Facility: CLINIC | Age: 78
End: 2018-06-01

## 2018-06-01 VITALS
TEMPERATURE: 98.1 F | HEIGHT: 62 IN | RESPIRATION RATE: 16 BRPM | SYSTOLIC BLOOD PRESSURE: 124 MMHG | BODY MASS INDEX: 25.76 KG/M2 | HEART RATE: 55 BPM | WEIGHT: 140 LBS | DIASTOLIC BLOOD PRESSURE: 72 MMHG | OXYGEN SATURATION: 95 %

## 2018-06-01 DIAGNOSIS — I10 ESSENTIAL HYPERTENSION: ICD-10-CM

## 2018-06-01 DIAGNOSIS — E55.9 VITAMIN D DEFICIENCY: ICD-10-CM

## 2018-06-01 DIAGNOSIS — J30.1 ACUTE SEASONAL ALLERGIC RHINITIS DUE TO POLLEN: ICD-10-CM

## 2018-06-01 DIAGNOSIS — Z00.00 MEDICARE ANNUAL WELLNESS VISIT, SUBSEQUENT: Primary | ICD-10-CM

## 2018-06-01 PROBLEM — R10.9 INTRACTABLE ABDOMINAL PAIN: Status: RESOLVED | Noted: 2018-01-29 | Resolved: 2018-06-01

## 2018-06-01 LAB
25(OH)D3 SERPL-MCNC: 37.2 NG/ML
ALBUMIN SERPL-MCNC: 4.3 G/DL (ref 3.2–4.8)
ALBUMIN/GLOB SERPL: 2.2 G/DL (ref 1.5–2.5)
ALP SERPL-CCNC: 90 U/L (ref 25–100)
ALT SERPL W P-5'-P-CCNC: 7 U/L (ref 7–40)
ANION GAP SERPL CALCULATED.3IONS-SCNC: 8 MMOL/L (ref 3–11)
ARTICHOKE IGE QN: 91 MG/DL (ref 0–130)
AST SERPL-CCNC: 17 U/L (ref 0–33)
BASOPHILS # BLD AUTO: 0.04 10*3/MM3 (ref 0–0.2)
BASOPHILS NFR BLD AUTO: 0.6 % (ref 0–1)
BILIRUB BLD-MCNC: NEGATIVE MG/DL
BILIRUB SERPL-MCNC: 1.2 MG/DL (ref 0.3–1.2)
BUN BLD-MCNC: 27 MG/DL (ref 9–23)
BUN/CREAT SERPL: 16.9 (ref 7–25)
CALCIUM SPEC-SCNC: 8.8 MG/DL (ref 8.7–10.4)
CHLORIDE SERPL-SCNC: 100 MMOL/L (ref 99–109)
CHOLEST SERPL-MCNC: 150 MG/DL (ref 0–200)
CLARITY, POC: CLEAR
CO2 SERPL-SCNC: 35 MMOL/L (ref 20–31)
COLOR UR: YELLOW
CREAT BLD-MCNC: 1.6 MG/DL (ref 0.6–1.3)
DEPRECATED RDW RBC AUTO: 42.3 FL (ref 37–54)
EOSINOPHIL # BLD AUTO: 0.07 10*3/MM3 (ref 0–0.3)
EOSINOPHIL NFR BLD AUTO: 1 % (ref 0–3)
ERYTHROCYTE [DISTWIDTH] IN BLOOD BY AUTOMATED COUNT: 13.7 % (ref 11.3–14.5)
GFR SERPL CREATININE-BSD FRML MDRD: 31 ML/MIN/1.73
GLOBULIN UR ELPH-MCNC: 2 GM/DL
GLUCOSE BLD-MCNC: 103 MG/DL (ref 70–100)
GLUCOSE UR STRIP-MCNC: NEGATIVE MG/DL
HCT VFR BLD AUTO: 41.9 % (ref 34.5–44)
HDLC SERPL-MCNC: 60 MG/DL (ref 40–60)
HGB BLD-MCNC: 12.8 G/DL (ref 11.5–15.5)
IMM GRANULOCYTES # BLD: 0.01 10*3/MM3 (ref 0–0.03)
IMM GRANULOCYTES NFR BLD: 0.1 % (ref 0–0.6)
KETONES UR QL: NEGATIVE
LEUKOCYTE EST, POC: NEGATIVE
LYMPHOCYTES # BLD AUTO: 2.18 10*3/MM3 (ref 0.6–4.8)
LYMPHOCYTES NFR BLD AUTO: 30.5 % (ref 24–44)
MCH RBC QN AUTO: 26 PG (ref 27–31)
MCHC RBC AUTO-ENTMCNC: 30.5 G/DL (ref 32–36)
MCV RBC AUTO: 85.2 FL (ref 80–99)
MONOCYTES # BLD AUTO: 0.39 10*3/MM3 (ref 0–1)
MONOCYTES NFR BLD AUTO: 5.5 % (ref 0–12)
NEUTROPHILS # BLD AUTO: 4.47 10*3/MM3 (ref 1.5–8.3)
NEUTROPHILS NFR BLD AUTO: 62.4 % (ref 41–71)
NITRITE UR-MCNC: NEGATIVE MG/ML
PH UR: 7 [PH] (ref 5–8)
PLATELET # BLD AUTO: 151 10*3/MM3 (ref 150–450)
PMV BLD AUTO: 12 FL (ref 6–12)
POTASSIUM BLD-SCNC: 3.9 MMOL/L (ref 3.5–5.5)
PROT SERPL-MCNC: 6.3 G/DL (ref 5.7–8.2)
PROT UR STRIP-MCNC: ABNORMAL MG/DL
RBC # BLD AUTO: 4.92 10*6/MM3 (ref 3.89–5.14)
RBC # UR STRIP: NEGATIVE /UL
SODIUM BLD-SCNC: 143 MMOL/L (ref 132–146)
SP GR UR: 1.01 (ref 1–1.03)
TRIGL SERPL-MCNC: 40 MG/DL (ref 0–150)
TSH SERPL DL<=0.05 MIU/L-ACNC: 4.33 MIU/ML (ref 0.35–5.35)
UROBILINOGEN UR QL: NORMAL
WBC NRBC COR # BLD: 7.15 10*3/MM3 (ref 3.5–10.8)

## 2018-06-01 PROCEDURE — 80053 COMPREHEN METABOLIC PANEL: CPT | Performed by: FAMILY MEDICINE

## 2018-06-01 PROCEDURE — 84443 ASSAY THYROID STIM HORMONE: CPT | Performed by: FAMILY MEDICINE

## 2018-06-01 PROCEDURE — 81003 URINALYSIS AUTO W/O SCOPE: CPT | Performed by: FAMILY MEDICINE

## 2018-06-01 PROCEDURE — 80061 LIPID PANEL: CPT | Performed by: FAMILY MEDICINE

## 2018-06-01 PROCEDURE — 82306 VITAMIN D 25 HYDROXY: CPT | Performed by: FAMILY MEDICINE

## 2018-06-01 PROCEDURE — 96372 THER/PROPH/DIAG INJ SC/IM: CPT | Performed by: FAMILY MEDICINE

## 2018-06-01 PROCEDURE — 85025 COMPLETE CBC W/AUTO DIFF WBC: CPT | Performed by: FAMILY MEDICINE

## 2018-06-01 PROCEDURE — G0439 PPPS, SUBSEQ VISIT: HCPCS | Performed by: FAMILY MEDICINE

## 2018-06-01 PROCEDURE — 36415 COLL VENOUS BLD VENIPUNCTURE: CPT | Performed by: FAMILY MEDICINE

## 2018-06-01 RX ORDER — LOSARTAN POTASSIUM 100 MG/1
100 TABLET ORAL DAILY
Qty: 90 TABLET | Refills: 3 | Status: SHIPPED | OUTPATIENT
Start: 2018-06-01 | End: 2019-06-26 | Stop reason: SDUPTHER

## 2018-06-01 RX ORDER — ATENOLOL 100 MG/1
100 TABLET ORAL DAILY
Qty: 90 TABLET | Refills: 3 | Status: SHIPPED | OUTPATIENT
Start: 2018-06-01 | End: 2019-05-29 | Stop reason: SDUPTHER

## 2018-06-01 RX ORDER — METHYLPREDNISOLONE ACETATE 40 MG/ML
40 INJECTION, SUSPENSION INTRA-ARTICULAR; INTRALESIONAL; INTRAMUSCULAR; SOFT TISSUE ONCE
Status: COMPLETED | OUTPATIENT
Start: 2018-06-01 | End: 2018-06-01

## 2018-06-01 RX ORDER — AMLODIPINE BESYLATE 5 MG/1
5 TABLET ORAL DAILY
Qty: 90 TABLET | Refills: 3 | Status: SHIPPED | OUTPATIENT
Start: 2018-06-01 | End: 2019-03-18

## 2018-06-01 RX ADMIN — METHYLPREDNISOLONE ACETATE 40 MG: 40 INJECTION, SUSPENSION INTRA-ARTICULAR; INTRALESIONAL; INTRAMUSCULAR; SOFT TISSUE at 09:15

## 2018-06-01 NOTE — PROGRESS NOTES
QUICK REFERENCE INFORMATION:  The ABCs of the Annual Wellness Visit    Subsequent Medicare Wellness Visit    HEALTH RISK ASSESSMENT    1940    Recent Hospitalizations:  No hospitalization(s) within the last year..        Current Medical Providers:  Patient Care Team:  Hilario Vasques MD as PCP - General  Hilario Vasques MD as PCP - Family Medicine  Kasey Alvarez MD as PCP - Claims Attributed  Jenny Cardoso RN as Care Coordinator (Population Health)        Smoking Status:  History   Smoking Status   • Never Smoker   Smokeless Tobacco   • Never Used       Alcohol Consumption:  History   Alcohol Use No       Depression Screen:   PHQ-2/PHQ-9 Depression Screening 6/1/2018   Little interest or pleasure in doing things 0   Feeling down, depressed, or hopeless 1   Total Score 1       Health Habits and Functional and Cognitive Screening:  Functional & Cognitive Status 6/1/2018   Do you have difficulty preparing food and eating? No   Do you have difficulty bathing yourself, getting dressed or grooming yourself? No   Do you have difficulty using the toilet? No   Do you have difficulty moving around from place to place? No   Do you have trouble with steps or getting out of a bed or a chair? No   In the past year have you fallen or experienced a near fall? No   Current Diet Well Balanced Diet   Dental Exam Up to date   Eye Exam Up to date   Exercise (times per week) 2 times per week   Current Exercise Activities Include Walking   Do you need help using the phone?  No   Are you deaf or do you have serious difficulty hearing?  No   Do you need help with transportation? Yes   Do you need help shopping? No   Do you need help preparing meals?  No   Do you need help with housework?  No   Do you need help with laundry? No   Do you need help taking your medications? No   Do you need help managing money? No   Do you ever drive or ride in a car without wearing a seat belt? No   Have you felt unusual stress, anger or loneliness  in the last month? No   Who do you live with? Child   If you need help, do you have trouble finding someone available to you? No   Do you have difficulty concentrating, remembering or making decisions? No           Does the patient have evidence of cognitive impairment? No    Aspirin use counseling: Taking ASA appropriately as indicated      Recent Lab Results:  CMP:  Lab Results   Component Value Date    BUN 23 04/24/2018    CREATININE 1.70 (H) 04/24/2018    EGFRIFNONA 29 (L) 04/24/2018    BCR 13.5 04/24/2018     04/24/2018    K 3.8 04/24/2018    CO2 35.0 (H) 04/24/2018    CALCIUM 9.4 04/24/2018    PROTENTOTREF 5.9 (L) 04/24/2018    ALBUMIN 3.7 04/24/2018    ALBUMIN 4.10 04/24/2018    LABGLOBREF 2.2 04/24/2018    LABIL2 1.7 04/24/2018    LABIL2 2.3 04/24/2018    BILITOT 1.1 04/24/2018    ALKPHOS 87 04/24/2018    AST 15 04/24/2018    ALT 4 (L) 04/24/2018     Lipid Panel:  Lab Results   Component Value Date    CHOL 156 05/23/2017    TRIG 43 05/23/2017    HDL 62 (H) 05/23/2017     HbA1c:       Visual Acuity:  No exam data present    Age-appropriate Screening Schedule:  Refer to the list below for future screening recommendations based on patient's age, sex and/or medical conditions. Orders for these recommended tests are listed in the plan section. The patient has been provided with a written plan.    Health Maintenance   Topic Date Due   • PNEUMOCOCCAL VACCINES (65+ LOW/MEDIUM RISK) (2 of 2 - PPSV23) 03/25/2009   • ZOSTER VACCINE (2 of 2) 12/01/2009   • INFLUENZA VACCINE  08/01/2018   • TDAP/TD VACCINES (2 - Td) 05/15/2022        Subjective   History of Present Illness    Carin Yusuf is a 77 y.o. female who presents for an Subsequent Wellness Visit.    The following portions of the patient's history were reviewed and updated as appropriate: allergies, current medications, past family history, past medical history, past social history, past surgical history and problem list.    Outpatient Medications Prior  to Visit   Medication Sig Dispense Refill   • albuterol (PROAIR HFA) 108 (90 BASE) MCG/ACT inhaler Inhale 2 puffs Every 4 (Four) Hours As Needed for Wheezing. 1 inhaler 2   • albuterol (PROVENTIL) (2.5 MG/3ML) 0.083% nebulizer solution Use one  Vial via nebulizer by mouth every 8 hours as needed for wheezing 225 mL 0   • Azelastine-Fluticasone (DYMISTA NA) into each nostril.     • RAYSA ASPIRIN PO Take 81 mg by mouth daily.     • Calcium-Vitamin D-Vitamin K (VIACTIV PO) Take 1 tablet by mouth daily.     • chlorthalidone (HYGROTON) 25 MG tablet Take 25 mg by mouth Daily. Take 1/2 daily .     • Fluticasone Propionate, Inhal, 100 MCG/BLIST aerosol powder  Inhale 1 puff 2 (Two) Times a Day. 60 each 3   • furosemide (LASIX) 20 MG tablet TAKE 1 TO 2 TABLETS BY MOUTH DAILY AS DIRECTED 60 tablet 5   • ibrutinib (IMBRUVICA) 280 MG tablet tablet Take 1 tablet by mouth Daily. 28 tablet 11   • montelukast (SINGULAIR) 10 MG tablet Take 1 tablet by mouth Every Night. 90 tablet 1   • Multiple Vitamins-Minerals (MULTIVITAMIN ADULT PO) Take 1 tablet by mouth Daily.     • amLODIPine (NORVASC) 5 MG tablet take 1 tablet by mouth once daily 90 tablet 0   • atenolol (TENORMIN) 50 MG tablet Take 2 tablets by mouth Daily. 60 tablet 4   • losartan (COZAAR) 100 MG tablet Take 1 tablet by mouth Daily. 90 tablet 1   • IMBRUVICA 140 MG tablet tablet        No facility-administered medications prior to visit.        Patient Active Problem List   Diagnosis   • Waldenstrom macroglobulinemia   • Gastroesophageal reflux disease   • Essential hypertension   • Scoliosis   • Hereditary essential tremor   • Prediabetes   • Chronic renal impairment, stage 3 (moderate)   • LUQ abdominal pain   • Asthma   • Splenomegaly   • Thrombocytopenia   • Gastric wall thickening       Advance Care Planning:  has an advance directive - a copy HAS NOT been provided    Identification of Risk Factors:  Risk factors include: cardiovascular risk and increased fall  risk.    Review of Systems   Constitutional: Negative for chills and fever.   HENT: Negative for congestion and sore throat.    Respiratory: Positive for chest tightness. Negative for cough and shortness of breath.    Cardiovascular: Negative for chest pain and leg swelling.   Gastrointestinal: Negative for diarrhea, nausea and vomiting.   Genitourinary: Negative for dysuria and hematuria.   Musculoskeletal: Positive for arthralgias. Negative for joint swelling and myalgias.   Skin: Negative.    Neurological: Positive for tremors. Negative for dizziness and seizures.   Hematological: Negative for adenopathy. Bruises/bleeds easily.   Psychiatric/Behavioral: Negative.        Compared to one year ago, the patient feels her physical health is the same.  Compared to one year ago, the patient feels her mental health is the same.    Objective     Physical Exam   Constitutional: She is oriented to person, place, and time. She appears well-developed and well-nourished. She is cooperative.   HENT:   Head: Normocephalic.   Right Ear: External ear normal.   Left Ear: External ear normal.   Nose: Nose normal.   Mouth/Throat: Oropharynx is clear and moist.   Eyes: Conjunctivae are normal. Pupils are equal, round, and reactive to light. No scleral icterus.   Neck: Neck supple. Carotid bruit is not present. No thyromegaly present.   Cardiovascular: Normal rate, regular rhythm, normal heart sounds and intact distal pulses.    Pulmonary/Chest: Effort normal and breath sounds normal.   Abdominal: Soft. Bowel sounds are normal. She exhibits no mass. There is no hepatosplenomegaly.   Musculoskeletal: Normal range of motion. She exhibits no edema.   Neurological: She is alert and oriented to person, place, and time.   Resting tremor greater in the left upper extremity present on the right upper extremity in the head.   Skin: Skin is warm and dry. No rash noted.   Psychiatric: She has a normal mood and affect. Her behavior is normal.  "Cognition and memory are normal.   Nursing note and vitals reviewed.      Vitals:    06/01/18 0827   BP: 124/72   Pulse: 55   Resp: 16   Temp: 98.1 °F (36.7 °C)   SpO2: 95%   Weight: 63.5 kg (140 lb)   Height: 157.5 cm (62\")       Patient's Body mass index is 25.61 kg/m². BMI is within normal parameters. No follow-up required.      Assessment/Plan   Patient Self-Management and Personalized Health Advice  The patient has been provided with information about: prevention of cardiac or vascular disease and fall prevention and preventive services including:   · Bone densitometry screening, Diabetes screening, see lab orders, Glaucoma screening recommended, Zostavax vaccine (Herpes Zoster).    Visit Diagnoses:    ICD-10-CM ICD-9-CM   1. Medicare annual wellness visit, subsequent Z00.00 V70.0   2. Essential hypertension I10 401.9   3. Acute seasonal allergic rhinitis due to pollen J30.1 477.0   4. Vitamin D deficiency E55.9 268.9       Orders Placed This Encounter   Procedures   • Comprehensive Metabolic Panel   • Lipid Panel   • TSH   • Vitamin D 25 Hydroxy   • CBC Auto Differential   • POC Urinalysis Dipstick, Automated   • CBC & Differential     Order Specific Question:   Manual Differential     Answer:   No       Outpatient Encounter Prescriptions as of 6/1/2018   Medication Sig Dispense Refill   • albuterol (PROAIR HFA) 108 (90 BASE) MCG/ACT inhaler Inhale 2 puffs Every 4 (Four) Hours As Needed for Wheezing. 1 inhaler 2   • albuterol (PROVENTIL) (2.5 MG/3ML) 0.083% nebulizer solution Use one  Vial via nebulizer by mouth every 8 hours as needed for wheezing 225 mL 0   • amLODIPine (NORVASC) 5 MG tablet Take 1 tablet by mouth Daily. 90 tablet 3   • Azelastine-Fluticasone (DYMISTA NA) into each nostril.     • RAYSA ASPIRIN PO Take 81 mg by mouth daily.     • Calcium-Vitamin D-Vitamin K (VIACTIV PO) Take 1 tablet by mouth daily.     • chlorthalidone (HYGROTON) 25 MG tablet Take 25 mg by mouth Daily. Take 1/2 daily .     • " Fluticasone Propionate, Inhal, 100 MCG/BLIST aerosol powder  Inhale 1 puff 2 (Two) Times a Day. 60 each 3   • furosemide (LASIX) 20 MG tablet TAKE 1 TO 2 TABLETS BY MOUTH DAILY AS DIRECTED 60 tablet 5   • ibrutinib (IMBRUVICA) 280 MG tablet tablet Take 1 tablet by mouth Daily. 28 tablet 11   • ibrutinib (IMBRUVICA) 280 MG tablet tablet Take 280 mg by mouth Daily.     • losartan (COZAAR) 100 MG tablet Take 1 tablet by mouth Daily. 90 tablet 3   • montelukast (SINGULAIR) 10 MG tablet Take 1 tablet by mouth Every Night. 90 tablet 1   • Multiple Vitamins-Minerals (MULTIVITAMIN ADULT PO) Take 1 tablet by mouth Daily.     • [DISCONTINUED] amLODIPine (NORVASC) 5 MG tablet take 1 tablet by mouth once daily 90 tablet 0   • [DISCONTINUED] atenolol (TENORMIN) 50 MG tablet Take 2 tablets by mouth Daily. 60 tablet 4   • [DISCONTINUED] losartan (COZAAR) 100 MG tablet Take 1 tablet by mouth Daily. 90 tablet 1   • atenolol (TENORMIN) 100 MG tablet Take 1 tablet by mouth Daily. 90 tablet 3   • [DISCONTINUED] IMBRUVICA 140 MG tablet tablet        Facility-Administered Encounter Medications as of 6/1/2018   Medication Dose Route Frequency Provider Last Rate Last Dose   • [COMPLETED] methylPREDNISolone acetate (DEPO-medrol) injection 40 mg  40 mg Intramuscular Once Hilario Vasques MD   40 mg at 06/01/18 0915       Reviewed use of high risk medication in the elderly: yes  Reviewed for potential of harmful drug interactions in the elderly: not applicable    Follow Up:  Return in about 3 months (around 9/1/2018) for Recheck.     An After Visit Summary and PPPS with all of these plans were given to the patient.    See form

## 2018-06-18 NOTE — PROGRESS NOTES
"  PROBLEM LIST:  1. Waldenstrom's macroglobulinemia:  a) Splenomegaly was noted on a CT of the chest on 06/06/2015 done for weight  loss, decreased appetite and left upper quadrant pain.   b) Lab evaluation on 06/16/2015 showed an anemia with hemoglobin 10.2,  thrombocytopenia with platelets 116, a 0.6 g/dL IgM lambda monoclonal protein  was identified on SPEP, beta-2 microglobulin was 10.4, . 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   c) Treatment with Ibrutinib was started 07/2015.   2. Hypertension.   3. Essential tremor.   4. Scoliosis.     Subjective     HISTORY OF PRESENT ILLNESS:   Ms. Yusuf returns for follow-up.  She says she is feeling okay.  She gets tired easily.  She has a cough that is persistent and annoying to her.  She uses a nebulizer which helps.  She has noticed since starting the ibrutinib tablets that they will get stuck in her throat and cause burning.  She has about 4 bowel movements a day which are loose but not diarrhea.    Past Medical History, Past Surgical History, Social History, Family History have been reviewed and are without significant changes except as mentioned.    Review of Systems   A comprehensive 14 point review of systems was performed and was negative except as mentioned.    Medications:  The current medication list was reviewed in the EMR    ALLERGIES:    Allergies   Allergen Reactions   • Bactrim [Sulfamethoxazole-Trimethoprim] Other (See Comments)     unknown   • Sulfa Antibiotics Other (See Comments)     unknown   • Vancomycin Other (See Comments)     unknown   • Zosyn [Piperacillin Sod-Tazobactam So] Other (See Comments)     unknown       Objective      /73 Comment: pt took a nebulizer treatment before apt.  Pulse 52   Temp 97.7 °F (36.5 °C) (Temporal Artery )   Resp 16   Ht 157.5 cm (62.01\")   Wt 63.6 kg (140 lb 3.2 oz)  "  SpO2 95%   BMI 25.64 kg/m²      Performance Status: 1    General: well appearing female in no acute distress  Neuro: alert and oriented  HEENT: sclera anicteric, oropharynx clear  Lymphatics: no cervical, supraclavicular, or axillary adenopathy  Cardiovascular: regular rate and rhythm, no murmurs  Lungs: clear to auscultation bilaterally  Abdomen: Upper abdominal tenderness to palpation.  Extremeties: trace ankle edema bilaterally  Skin: no rashes  Psych: mood and affect appropriate    Labs: EBCT is stable.  Mild thrombocytopenia.        Assessment/Plan   Carin Yusuf is a 77 y.o. female with Waldenström's macroglobulinemia who has been on imbruvica with stable disease.  Clinically she is stable.  She continues to take Imbruvica 280 mg daily.  She is having more frequent loose bowel movements, but this has improved over the past few months.  She asked if she needed to follow up with Dr. Estrella.  I encouraged her to schedule an appointment.  The utility for repeat endoscopy given the improvement in her symptoms is unclear, but I will defer to Dr. Estrella on this.  We will otherwise continue her current treatment.  I will see her back in 3 months.              Visit time was 25 minutes, greater than 50% spent in counseling      Kasey Alvarez MD  [facility]    6/19/2018          CC:

## 2018-06-19 ENCOUNTER — SPECIALTY PHARMACY (OUTPATIENT)
Dept: ONCOLOGY | Facility: HOSPITAL | Age: 78
End: 2018-06-19

## 2018-06-19 ENCOUNTER — LAB (OUTPATIENT)
Dept: LAB | Facility: HOSPITAL | Age: 78
End: 2018-06-19

## 2018-06-19 ENCOUNTER — OFFICE VISIT (OUTPATIENT)
Dept: ONCOLOGY | Facility: CLINIC | Age: 78
End: 2018-06-19

## 2018-06-19 VITALS
OXYGEN SATURATION: 95 % | HEART RATE: 52 BPM | BODY MASS INDEX: 25.8 KG/M2 | WEIGHT: 140.2 LBS | SYSTOLIC BLOOD PRESSURE: 179 MMHG | TEMPERATURE: 97.7 F | DIASTOLIC BLOOD PRESSURE: 73 MMHG | RESPIRATION RATE: 16 BRPM | HEIGHT: 62 IN

## 2018-06-19 DIAGNOSIS — C88.0 WALDENSTROM MACROGLOBULINEMIA (HCC): Primary | Chronic | ICD-10-CM

## 2018-06-19 DIAGNOSIS — N18.30 CHRONIC KIDNEY DISEASE, STAGE III (MODERATE) (HCC): Primary | ICD-10-CM

## 2018-06-19 DIAGNOSIS — C88.0 WALDENSTROM MACROGLOBULINEMIA (HCC): ICD-10-CM

## 2018-06-19 DIAGNOSIS — C88.0 WALDENSTROM MACROGLOBULINEMIA (HCC): Primary | ICD-10-CM

## 2018-06-19 LAB
ALBUMIN SERPL-MCNC: 4.06 G/DL (ref 3.2–4.8)
ALBUMIN/GLOB SERPL: 2.1 G/DL (ref 1.5–2.5)
ALP SERPL-CCNC: 81 U/L (ref 25–100)
ALT SERPL W P-5'-P-CCNC: 5 U/L (ref 7–40)
ANION GAP SERPL CALCULATED.3IONS-SCNC: 8 MMOL/L (ref 3–11)
AST SERPL-CCNC: 14 U/L (ref 0–33)
BACTERIA UR QL AUTO: NORMAL /HPF
BILIRUB SERPL-MCNC: 1 MG/DL (ref 0.3–1.2)
BILIRUB UR QL STRIP: NEGATIVE
BUN BLD-MCNC: 26 MG/DL (ref 9–23)
BUN/CREAT SERPL: 16 (ref 7–25)
CALCIUM SPEC-SCNC: 8.6 MG/DL (ref 8.7–10.4)
CHLORIDE SERPL-SCNC: 101 MMOL/L (ref 99–109)
CLARITY UR: CLEAR
CO2 SERPL-SCNC: 32 MMOL/L (ref 20–31)
COLOR UR: YELLOW
CREAT BLD-MCNC: 1.62 MG/DL (ref 0.6–1.3)
ERYTHROCYTE [DISTWIDTH] IN BLOOD BY AUTOMATED COUNT: 15.1 % (ref 11.3–14.5)
GFR SERPL CREATININE-BSD FRML MDRD: 31 ML/MIN/1.73
GLOBULIN UR ELPH-MCNC: 1.9 GM/DL
GLUCOSE BLD-MCNC: 100 MG/DL (ref 70–100)
GLUCOSE UR STRIP-MCNC: NEGATIVE MG/DL
HCT VFR BLD AUTO: 39.6 % (ref 34.5–44)
HGB BLD-MCNC: 11.9 G/DL (ref 11.5–15.5)
HGB UR QL STRIP.AUTO: NEGATIVE
HYALINE CASTS UR QL AUTO: NORMAL /LPF
KETONES UR QL STRIP: NEGATIVE
LEUKOCYTE ESTERASE UR QL STRIP.AUTO: ABNORMAL
LYMPHOCYTES # BLD AUTO: 2 10*3/MM3 (ref 0.6–4.8)
LYMPHOCYTES NFR BLD AUTO: 30.6 % (ref 24–44)
MCH RBC QN AUTO: 24.7 PG (ref 27–31)
MCHC RBC AUTO-ENTMCNC: 30.1 G/DL (ref 32–36)
MCV RBC AUTO: 81.9 FL (ref 80–99)
MONOCYTES # BLD AUTO: 0.4 10*3/MM3 (ref 0–1)
MONOCYTES NFR BLD AUTO: 6.4 % (ref 0–12)
NEUTROPHILS # BLD AUTO: 4.2 10*3/MM3 (ref 1.5–8.3)
NEUTROPHILS NFR BLD AUTO: 63 % (ref 41–71)
NITRITE UR QL STRIP: NEGATIVE
PH UR STRIP.AUTO: 7.5 [PH] (ref 5–8)
PHOSPHATE SERPL-MCNC: 4.4 MG/DL (ref 2.4–5.1)
PLATELET # BLD AUTO: 125 10*3/MM3 (ref 150–450)
PMV BLD AUTO: 9.6 FL (ref 6–12)
POTASSIUM BLD-SCNC: 4.1 MMOL/L (ref 3.5–5.5)
PROT SERPL-MCNC: 6 G/DL (ref 5.7–8.2)
PROT UR QL STRIP: ABNORMAL
RBC # BLD AUTO: 4.83 10*6/MM3 (ref 3.89–5.14)
RBC # UR: NORMAL /HPF
REF LAB TEST METHOD: NORMAL
SODIUM BLD-SCNC: 141 MMOL/L (ref 132–146)
SP GR UR STRIP: 1.02 (ref 1–1.03)
SQUAMOUS #/AREA URNS HPF: NORMAL /HPF
UROBILINOGEN UR QL STRIP: ABNORMAL
WBC NRBC COR # BLD: 6.6 10*3/MM3 (ref 3.5–10.8)
WBC UR QL AUTO: NORMAL /HPF

## 2018-06-19 PROCEDURE — 86334 IMMUNOFIX E-PHORESIS SERUM: CPT

## 2018-06-19 PROCEDURE — 85025 COMPLETE CBC W/AUTO DIFF WBC: CPT

## 2018-06-19 PROCEDURE — 99214 OFFICE O/P EST MOD 30 MIN: CPT | Performed by: INTERNAL MEDICINE

## 2018-06-19 PROCEDURE — 84100 ASSAY OF PHOSPHORUS: CPT

## 2018-06-19 PROCEDURE — 36415 COLL VENOUS BLD VENIPUNCTURE: CPT

## 2018-06-19 PROCEDURE — 81001 URINALYSIS AUTO W/SCOPE: CPT

## 2018-06-19 PROCEDURE — 80053 COMPREHEN METABOLIC PANEL: CPT

## 2018-06-19 PROCEDURE — 82784 ASSAY IGA/IGD/IGG/IGM EACH: CPT

## 2018-06-19 PROCEDURE — 84165 PROTEIN E-PHORESIS SERUM: CPT

## 2018-06-19 NOTE — PROGRESS NOTES
Pt reports difficulty swallowing tablet formulation of Ibrutinib. Thus, will submit script for transition back to Ibrutinib 140mg capsules with instructions to take 2 capsules for dose of 280mg PO once daily to [facility] at [phone] (patient fills through [facility]), provided verbal and transition to capsule formulation on 6/20/18

## 2018-06-20 LAB
ALBUMIN SERPL-MCNC: 3.8 G/DL (ref 2.9–4.4)
ALBUMIN/GLOB SERPL: 1.8 {RATIO} (ref 0.7–1.7)
ALPHA1 GLOB FLD ELPH-MCNC: 0.2 G/DL (ref 0–0.4)
ALPHA2 GLOB SERPL ELPH-MCNC: 0.7 G/DL (ref 0.4–1)
B-GLOBULIN SERPL ELPH-MCNC: 0.7 G/DL (ref 0.7–1.3)
GAMMA GLOB SERPL ELPH-MCNC: 0.6 G/DL (ref 0.4–1.8)
GLOBULIN SER CALC-MCNC: 2.2 G/DL (ref 2.2–3.9)
IGA SERPL-MCNC: 17 MG/DL (ref 64–422)
IGG SERPL-MCNC: 315 MG/DL (ref 700–1600)
IGM SERPL-MCNC: 461 MG/DL (ref 26–217)
INTERPRETATION SERPL IEP-IMP: ABNORMAL
Lab: ABNORMAL
M-SPIKE: 0.2 G/DL
PROT SERPL-MCNC: 6 G/DL (ref 6–8.5)

## 2018-06-28 ENCOUNTER — SPECIALTY PHARMACY (OUTPATIENT)
Dept: ONCOLOGY | Facility: HOSPITAL | Age: 78
End: 2018-06-28

## 2018-06-28 DIAGNOSIS — C88.0 WALDENSTROM MACROGLOBULINEMIA (HCC): Primary | ICD-10-CM

## 2018-06-28 NOTE — PROGRESS NOTES
Patient called today 6/28/18. Requesting TABLET formulation. Reports that although she did initially experience some issues with swallowing, she believes this is due to not taking the medication with enough water. Believes the tablet formulation to be smaller in size and prefers to stick with tablet formulation. Called [facility] to update script to Ibrutinib 280mg Tablet - Take one tablet daily.

## 2018-07-31 RX ORDER — ALBUTEROL SULFATE 90 UG/1
AEROSOL, METERED RESPIRATORY (INHALATION)
Qty: 18 G | Refills: 3 | Status: SHIPPED | OUTPATIENT
Start: 2018-07-31 | End: 2019-03-18 | Stop reason: SDUPTHER

## 2018-07-31 RX ORDER — ALBUTEROL SULFATE 2.5 MG/3ML
SOLUTION RESPIRATORY (INHALATION)
Qty: 225 ML | Refills: 3 | Status: SHIPPED | OUTPATIENT
Start: 2018-07-31 | End: 2019-11-07 | Stop reason: SDUPTHER

## 2018-08-24 ENCOUNTER — TELEPHONE (OUTPATIENT)
Dept: FAMILY MEDICINE CLINIC | Facility: CLINIC | Age: 78
End: 2018-08-24

## 2018-08-24 DIAGNOSIS — N95.9 POST MENOPAUSAL PROBLEMS: Primary | ICD-10-CM

## 2018-08-24 NOTE — TELEPHONE ENCOUNTER
Called informed pt.  EUGENE Tejada RMA    ----- Message from Hilario Vasuqes MD sent at 8/24/2018 12:41 PM EDT -----  Regarding: RE: REFERRAL  Contact: [phone]  Order placed  ----- Message -----  From: Ranjeet Tejada MA  Sent: 8/23/2018   3:03 PM  To: Hilario Vasques MD  Subject: FW: REFERRAL                                         ----- Message -----  From: Misa Espinoza  Sent: 8/23/2018   2:31 PM  To: Ranjeet Tejada MA  Subject: REFERRAL                                         Patient called and said Dr Vasques wanted her to have a bone density test and hasn't heard anything back yet and it's been a couple of months.  Fanta said she didn't see an order for it.  Can you check with Dr Vasques and call and advise Ms. Yusuf.  Thank you.

## 2018-08-29 ENCOUNTER — HOSPITAL ENCOUNTER (OUTPATIENT)
Dept: BONE DENSITY | Facility: HOSPITAL | Age: 78
Discharge: HOME | End: 2018-08-29
Attending: FAMILY MEDICINE | Admitting: FAMILY MEDICINE

## 2018-08-29 DIAGNOSIS — N95.9 POST MENOPAUSAL PROBLEMS: ICD-10-CM

## 2018-08-29 PROCEDURE — 77080 DXA BONE DENSITY AXIAL: CPT

## 2018-09-06 ENCOUNTER — OFFICE VISIT (OUTPATIENT)
Dept: FAMILY MEDICINE CLINIC | Facility: CLINIC | Age: 78
End: 2018-09-06

## 2018-09-06 VITALS
WEIGHT: 144.6 LBS | HEIGHT: 62 IN | SYSTOLIC BLOOD PRESSURE: 144 MMHG | HEART RATE: 64 BPM | BODY MASS INDEX: 26.61 KG/M2 | OXYGEN SATURATION: 97 % | DIASTOLIC BLOOD PRESSURE: 80 MMHG | RESPIRATION RATE: 16 BRPM

## 2018-09-06 DIAGNOSIS — N18.30 CHRONIC RENAL IMPAIRMENT, STAGE 3 (MODERATE) (HCC): ICD-10-CM

## 2018-09-06 DIAGNOSIS — I10 ESSENTIAL HYPERTENSION: Primary | ICD-10-CM

## 2018-09-06 PROCEDURE — 99213 OFFICE O/P EST LOW 20 MIN: CPT | Performed by: FAMILY MEDICINE

## 2018-09-06 RX ORDER — BENZONATATE 100 MG/1
CAPSULE ORAL
Refills: 0 | COMMUNITY
Start: 2018-07-06 | End: 2018-12-07

## 2018-09-07 NOTE — PROGRESS NOTES
"Subjective   Carin Yusuf is a 78 y.o. female.     Hypertension   This is a chronic (Stable bp at home) problem. The current episode started more than 1 year ago. The problem has been gradually improving since onset. The problem is controlled. Associated symptoms include peripheral edema. Pertinent negatives include no chest pain, headaches, palpitations or shortness of breath. Risk factors for coronary artery disease include post-menopausal state and sedentary lifestyle. Current antihypertension treatment includes calcium channel blockers, beta blockers, diuretics and angiotensin blockers. The current treatment provides mild improvement. There are no compliance problems.  There is no history of angina, kidney disease or CAD/MI.   Arthritis   Presents for follow-up visit. She complains of stiffness. She reports no joint swelling. The symptoms have been stable. Affected locations include the left shoulder and right shoulder. Pertinent negatives include no diarrhea, dry mouth, dysuria, fatigue, fever, rash or Raynaud's syndrome.        The following portions of the patient's history were reviewed and updated as appropriate: allergies, current medications, past social history and problem list.    Review of Systems   Constitutional: Negative for fatigue, fever and unexpected weight change.   HENT: Negative for congestion and sore throat.    Respiratory: Negative for cough, chest tightness and shortness of breath.    Cardiovascular: Negative for chest pain, palpitations and leg swelling.   Gastrointestinal: Negative for diarrhea and nausea.   Genitourinary: Negative for dysuria.   Musculoskeletal: Positive for arthritis and stiffness. Negative for joint swelling.   Skin: Negative for color change and rash.   Neurological: Positive for tremors. Negative for dizziness, syncope, weakness and headaches.       Objective   /80   Pulse 64   Resp 16   Ht 157.5 cm (62.01\")   Wt 65.6 kg (144 lb 9.6 oz)   SpO2 97%  "  BMI 26.44 kg/m²   Physical Exam   Constitutional: She is oriented to person, place, and time. She appears well-developed and well-nourished. She is cooperative.   HENT:   Head: Normocephalic.   Right Ear: External ear normal.   Left Ear: External ear normal.   Nose: Nose normal.   Mouth/Throat: Oropharynx is clear and moist.   Eyes: Pupils are equal, round, and reactive to light. Conjunctivae are normal. No scleral icterus.   Neck: Neck supple. No JVD present. Carotid bruit is not present. No thyromegaly present.   Cardiovascular: Normal rate, regular rhythm and normal heart sounds.    Pulmonary/Chest: Effort normal and breath sounds normal.   Abdominal: There is no hepatosplenomegaly.   Musculoskeletal: Normal range of motion.   Neurological: She is alert and oriented to person, place, and time.   Essential tremor   Skin: Skin is warm and dry. No rash noted. No erythema.   Psychiatric: She has a normal mood and affect. Cognition and memory are normal.   Nursing note and vitals reviewed.      Assessment/Plan   Problem List Items Addressed This Visit     Essential hypertension - Primary    Chronic renal impairment, stage 3 (moderate)          No orders of the defined types were placed in this encounter.

## 2018-09-12 ENCOUNTER — OFFICE VISIT (OUTPATIENT)
Dept: ONCOLOGY | Facility: CLINIC | Age: 78
End: 2018-09-12

## 2018-09-12 ENCOUNTER — LAB (OUTPATIENT)
Dept: LAB | Facility: HOSPITAL | Age: 78
End: 2018-09-12

## 2018-09-12 VITALS
WEIGHT: 143 LBS | TEMPERATURE: 97 F | OXYGEN SATURATION: 89 % | HEART RATE: 58 BPM | SYSTOLIC BLOOD PRESSURE: 158 MMHG | DIASTOLIC BLOOD PRESSURE: 69 MMHG | HEIGHT: 62 IN | RESPIRATION RATE: 16 BRPM | BODY MASS INDEX: 26.31 KG/M2

## 2018-09-12 DIAGNOSIS — C88.0 WALDENSTROM MACROGLOBULINEMIA (HCC): ICD-10-CM

## 2018-09-12 DIAGNOSIS — C88.0 WALDENSTROM MACROGLOBULINEMIA (HCC): Primary | Chronic | ICD-10-CM

## 2018-09-12 LAB
ALBUMIN SERPL-MCNC: 4.04 G/DL (ref 3.2–4.8)
ALBUMIN/GLOB SERPL: 2.6 G/DL (ref 1.5–2.5)
ALP SERPL-CCNC: 92 U/L (ref 25–100)
ALT SERPL W P-5'-P-CCNC: 4 U/L (ref 7–40)
ANION GAP SERPL CALCULATED.3IONS-SCNC: 7 MMOL/L (ref 3–11)
AST SERPL-CCNC: 16 U/L (ref 0–33)
BILIRUB SERPL-MCNC: 1.3 MG/DL (ref 0.3–1.2)
BUN BLD-MCNC: 19 MG/DL (ref 9–23)
BUN/CREAT SERPL: 11.7 (ref 7–25)
CALCIUM SPEC-SCNC: 8.7 MG/DL (ref 8.7–10.4)
CHLORIDE SERPL-SCNC: 102 MMOL/L (ref 99–109)
CO2 SERPL-SCNC: 31 MMOL/L (ref 20–31)
CREAT BLD-MCNC: 1.63 MG/DL (ref 0.6–1.3)
ERYTHROCYTE [DISTWIDTH] IN BLOOD BY AUTOMATED COUNT: 15.3 % (ref 11.3–14.5)
GFR SERPL CREATININE-BSD FRML MDRD: 30 ML/MIN/1.73
GLOBULIN UR ELPH-MCNC: 1.6 GM/DL
GLUCOSE BLD-MCNC: 106 MG/DL (ref 70–100)
HCT VFR BLD AUTO: 36.7 % (ref 34.5–44)
HGB BLD-MCNC: 12.1 G/DL (ref 11.5–15.5)
LYMPHOCYTES # BLD AUTO: 1.4 10*3/MM3 (ref 0.6–4.8)
LYMPHOCYTES NFR BLD AUTO: 29.1 % (ref 24–44)
MCH RBC QN AUTO: 27 PG (ref 27–31)
MCHC RBC AUTO-ENTMCNC: 33 G/DL (ref 32–36)
MCV RBC AUTO: 81.9 FL (ref 80–99)
MONOCYTES # BLD AUTO: 0.2 10*3/MM3 (ref 0–1)
MONOCYTES NFR BLD AUTO: 3.9 % (ref 0–12)
NEUTROPHILS # BLD AUTO: 3.2 10*3/MM3 (ref 1.5–8.3)
NEUTROPHILS NFR BLD AUTO: 67 % (ref 41–71)
PLATELET # BLD AUTO: 106 10*3/MM3 (ref 150–450)
PMV BLD AUTO: 9.1 FL (ref 6–12)
POTASSIUM BLD-SCNC: 4 MMOL/L (ref 3.5–5.5)
PROT SERPL-MCNC: 5.6 G/DL (ref 5.7–8.2)
RBC # BLD AUTO: 4.49 10*6/MM3 (ref 3.89–5.14)
SODIUM BLD-SCNC: 140 MMOL/L (ref 132–146)
WBC NRBC COR # BLD: 4.8 10*3/MM3 (ref 3.5–10.8)

## 2018-09-12 PROCEDURE — 85025 COMPLETE CBC W/AUTO DIFF WBC: CPT

## 2018-09-12 PROCEDURE — 99213 OFFICE O/P EST LOW 20 MIN: CPT | Performed by: INTERNAL MEDICINE

## 2018-09-12 PROCEDURE — 86334 IMMUNOFIX E-PHORESIS SERUM: CPT

## 2018-09-12 PROCEDURE — 83883 ASSAY NEPHELOMETRY NOT SPEC: CPT

## 2018-09-12 PROCEDURE — 84165 PROTEIN E-PHORESIS SERUM: CPT

## 2018-09-12 PROCEDURE — 80053 COMPREHEN METABOLIC PANEL: CPT

## 2018-09-12 PROCEDURE — 36415 COLL VENOUS BLD VENIPUNCTURE: CPT

## 2018-09-12 PROCEDURE — 82784 ASSAY IGA/IGD/IGG/IGM EACH: CPT

## 2018-09-12 NOTE — PROGRESS NOTES
"  PROBLEM LIST:  1. Waldenstrom's macroglobulinemia:  a) Splenomegaly was noted on a CT of the chest on 06/06/2015 done for weight  loss, decreased appetite and left upper quadrant pain.   b) Lab evaluation on 06/16/2015 showed an anemia with hemoglobin 10.2,  thrombocytopenia with platelets 116, a 0.6 g/dL IgM lambda monoclonal protein  was identified on SPEP, beta-2 microglobulin was 10.4, . 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   c) Treatment with Ibrutinib was started 07/2015.   2. Hypertension.   3. Essential tremor.   4. Scoliosis.     Subjective     HISTORY OF PRESENT ILLNESS:   Ms. Yusuf returns for follow-up.    She says she is ok.  She did have some diarrhea last night that kept her up, and that has been more frequent recently.    She continues to cough but isn't feeling short of breath.  She brings up whitish sputum.      Past Medical History, Past Surgical History, Social History, Family History have been reviewed and are without significant changes except as mentioned.    Review of Systems   A comprehensive 14 point review of systems was performed and was negative except as mentioned.    Medications:  The current medication list was reviewed in the EMR    ALLERGIES:    Allergies   Allergen Reactions   • Bactrim [Sulfamethoxazole-Trimethoprim] Other (See Comments)     unknown   • Sulfa Antibiotics Other (See Comments)     unknown   • Vancomycin Other (See Comments)     unknown   • Zosyn [Piperacillin Sod-Tazobactam So] Other (See Comments)     unknown       Objective      Ht 157.5 cm (62\")      Performance Status: 1    General: well appearing female in no acute distress  Neuro: alert and oriented  HEENT: sclera anicteric, oropharynx clear  Lymphatics: no cervical, supraclavicular, or axillary adenopathy  Cardiovascular: regular rate and rhythm, no murmurs  Lungs: " clear to auscultation bilaterally  Abdomen: soft, nontender  Extremeties: trace ankle edema bilaterally  Skin: no rashes  Psych: mood and affect appropriate    Labs:   M spike from [facility] - 0.2, stable.        Assessment/Plan   Carin Yusuf is a 78 y.o. female with Waldenström's macroglobulinemia who has been on imbruvica with stable disease.  She is having a little more frequent diarrhea.  I recommended that she use Imodium if needed to control this.  Otherwise she continues to be troubled by a cough.  Today on exam her lungs are clear and she is afebrile.  However if she continues to have worsening of her cough or become short of breath she will need further attention for this.  I will plan to see her back again in 3 months.            Visit time was 15 minutes, greater than 50% spent in counseling      Kasey Alvarez MD  [facility]    9/12/2018          CC:

## 2018-09-13 LAB
ALBUMIN SERPL-MCNC: 3.5 G/DL (ref 2.9–4.4)
ALBUMIN/GLOB SERPL: 1.6 {RATIO} (ref 0.7–1.7)
ALPHA1 GLOB FLD ELPH-MCNC: 0.3 G/DL (ref 0–0.4)
ALPHA2 GLOB SERPL ELPH-MCNC: 0.7 G/DL (ref 0.4–1)
B-GLOBULIN SERPL ELPH-MCNC: 0.7 G/DL (ref 0.7–1.3)
GAMMA GLOB SERPL ELPH-MCNC: 0.6 G/DL (ref 0.4–1.8)
GLOBULIN SER CALC-MCNC: 2.3 G/DL (ref 2.2–3.9)
IGA SERPL-MCNC: 16 MG/DL (ref 64–422)
IGG SERPL-MCNC: 385 MG/DL (ref 700–1600)
IGM SERPL-MCNC: 459 MG/DL (ref 26–217)
INTERPRETATION SERPL IEP-IMP: ABNORMAL
KAPPA LC SERPL-MCNC: 12.5 MG/L (ref 3.3–19.4)
KAPPA LC/LAMBDA SER: 0.63 {RATIO} (ref 0.26–1.65)
LAMBDA LC FREE SERPL-MCNC: 19.9 MG/L (ref 5.7–26.3)
Lab: ABNORMAL
M-SPIKE: 0.2 G/DL
PROT SERPL-MCNC: 5.8 G/DL (ref 6–8.5)

## 2018-10-16 ENCOUNTER — EPISODE CHANGES (OUTPATIENT)
Dept: CASE MANAGEMENT | Facility: OTHER | Age: 78
End: 2018-10-16

## 2018-10-22 ENCOUNTER — OFFICE VISIT (OUTPATIENT)
Dept: ONCOLOGY | Facility: CLINIC | Age: 78
End: 2018-10-22

## 2018-10-22 ENCOUNTER — TELEPHONE (OUTPATIENT)
Dept: ONCOLOGY | Facility: CLINIC | Age: 78
End: 2018-10-22

## 2018-10-22 ENCOUNTER — LAB (OUTPATIENT)
Dept: LAB | Facility: HOSPITAL | Age: 78
End: 2018-10-22

## 2018-10-22 VITALS
HEART RATE: 58 BPM | WEIGHT: 145 LBS | DIASTOLIC BLOOD PRESSURE: 63 MMHG | OXYGEN SATURATION: 92 % | TEMPERATURE: 98.6 F | HEIGHT: 62 IN | RESPIRATION RATE: 16 BRPM | BODY MASS INDEX: 26.68 KG/M2 | SYSTOLIC BLOOD PRESSURE: 191 MMHG

## 2018-10-22 DIAGNOSIS — C88.0 WALDENSTROM MACROGLOBULINEMIA (HCC): Primary | Chronic | ICD-10-CM

## 2018-10-22 DIAGNOSIS — R31.9 HEMATURIA, UNSPECIFIED TYPE: ICD-10-CM

## 2018-10-22 DIAGNOSIS — C88.0 WALDENSTROM MACROGLOBULINEMIA (HCC): ICD-10-CM

## 2018-10-22 DIAGNOSIS — I10 ESSENTIAL HYPERTENSION: ICD-10-CM

## 2018-10-22 LAB
ALBUMIN SERPL-MCNC: 4.07 G/DL (ref 3.2–4.8)
ALBUMIN/GLOB SERPL: 2.5 G/DL (ref 1.5–2.5)
ALP SERPL-CCNC: 88 U/L (ref 25–100)
ALT SERPL W P-5'-P-CCNC: 8 U/L (ref 7–40)
ANION GAP SERPL CALCULATED.3IONS-SCNC: 8 MMOL/L (ref 3–11)
AST SERPL-CCNC: 18 U/L (ref 0–33)
BACTERIA UR QL AUTO: ABNORMAL /HPF
BILIRUB SERPL-MCNC: 1.4 MG/DL (ref 0.3–1.2)
BILIRUB UR QL STRIP: NEGATIVE
BUN BLD-MCNC: 20 MG/DL (ref 9–23)
BUN/CREAT SERPL: 13.2 (ref 7–25)
CALCIUM SPEC-SCNC: 8.8 MG/DL (ref 8.7–10.4)
CHLORIDE SERPL-SCNC: 100 MMOL/L (ref 99–109)
CLARITY UR: CLEAR
CO2 SERPL-SCNC: 31 MMOL/L (ref 20–31)
COLOR UR: YELLOW
CREAT BLD-MCNC: 1.51 MG/DL (ref 0.6–1.3)
ERYTHROCYTE [DISTWIDTH] IN BLOOD BY AUTOMATED COUNT: 15.1 % (ref 11.3–14.5)
GFR SERPL CREATININE-BSD FRML MDRD: 33 ML/MIN/1.73
GLOBULIN UR ELPH-MCNC: 1.6 GM/DL
GLUCOSE BLD-MCNC: 131 MG/DL (ref 70–100)
GLUCOSE UR STRIP-MCNC: NEGATIVE MG/DL
HCT VFR BLD AUTO: 37.2 % (ref 34.5–44)
HGB BLD-MCNC: 12.4 G/DL (ref 11.5–15.5)
HGB UR QL STRIP.AUTO: NEGATIVE
HYALINE CASTS UR QL AUTO: ABNORMAL /LPF
KETONES UR QL STRIP: NEGATIVE
LEUKOCYTE ESTERASE UR QL STRIP.AUTO: NEGATIVE
LYMPHOCYTES # BLD AUTO: 1.7 10*3/MM3 (ref 0.6–4.8)
LYMPHOCYTES NFR BLD AUTO: 30.4 % (ref 24–44)
MCH RBC QN AUTO: 27 PG (ref 27–31)
MCHC RBC AUTO-ENTMCNC: 33.2 G/DL (ref 32–36)
MCV RBC AUTO: 81.3 FL (ref 80–99)
MONOCYTES # BLD AUTO: 0.3 10*3/MM3 (ref 0–1)
MONOCYTES NFR BLD AUTO: 5.2 % (ref 0–12)
NEUTROPHILS # BLD AUTO: 3.5 10*3/MM3 (ref 1.5–8.3)
NEUTROPHILS NFR BLD AUTO: 64.4 % (ref 41–71)
NITRITE UR QL STRIP: NEGATIVE
PH UR STRIP.AUTO: 7.5 [PH] (ref 5–8)
PLATELET # BLD AUTO: 120 10*3/MM3 (ref 150–450)
PMV BLD AUTO: 8.6 FL (ref 6–12)
POTASSIUM BLD-SCNC: 4.5 MMOL/L (ref 3.5–5.5)
PROT SERPL-MCNC: 5.7 G/DL (ref 5.7–8.2)
PROT UR QL STRIP: ABNORMAL
RBC # BLD AUTO: 4.58 10*6/MM3 (ref 3.89–5.14)
RBC # UR: ABNORMAL /HPF
REF LAB TEST METHOD: ABNORMAL
SODIUM BLD-SCNC: 139 MMOL/L (ref 132–146)
SP GR UR STRIP: 1.01 (ref 1–1.03)
SQUAMOUS #/AREA URNS HPF: ABNORMAL /HPF
UROBILINOGEN UR QL STRIP: ABNORMAL
WBC NRBC COR # BLD: 5.5 10*3/MM3 (ref 3.5–10.8)
WBC UR QL AUTO: ABNORMAL /HPF

## 2018-10-22 PROCEDURE — 80053 COMPREHEN METABOLIC PANEL: CPT

## 2018-10-22 PROCEDURE — 36415 COLL VENOUS BLD VENIPUNCTURE: CPT

## 2018-10-22 PROCEDURE — 81001 URINALYSIS AUTO W/SCOPE: CPT

## 2018-10-22 PROCEDURE — 85025 COMPLETE CBC W/AUTO DIFF WBC: CPT

## 2018-10-22 PROCEDURE — 99214 OFFICE O/P EST MOD 30 MIN: CPT | Performed by: NURSE PRACTITIONER

## 2018-10-22 NOTE — PROGRESS NOTES
ONCOLOGY URGENT CARE CLINIC VISIT    Carin Yusuf  [id number]  1940    Chief Complaint: hematuria      History of present illness:  Carin Yusuf is a 78 y.o. year old female who is currently undergoing treatment for Waldenström's macroglobulinemia. Patient called the office with complaint of Hematuria. she has been evaluated by a triage nurse and decision was made to bring patient into the urgent care clinic today for further evaluation.     The patient states that upon awakening this morning and she went to the restroom she had hematuria with the water in the toilet bowl being filled with bright red blood.  She also had a bowel movement but states there is no blood in the stool or dark tarry stool.  She has had no pain associated with this, she denies any fevers.  Since that episode this morning she has had some mild spotting with blood on toilet tissue but no further gross hematuria.  She states otherwise she has felt in her usual state of health.  She denies any headaches, no dizziness or vision changes.  No nausea or vomiting.    Oncology History:     No history exists.       Past Medical History:   Diagnosis Date   • Abdominal pressure    • Abnormal laboratory test    • Allergic rhinitis    • Asthma    • Chronic kidney disease    • Encounter for screening for cardiovascular disorders    • Essential tremor    • Hypertension    • Lymphoma (CMS/HCC)    • Pneumonia     Cough   • Scoliosis    • Tendonitis        Past Surgical History:   Procedure Laterality Date   • BACK SURGERY      1953   • ENDOSCOPY N/A 1/29/2018    Procedure: ESOPHAGOGASTRODUODENOSCOPY WITH BIOPSY;  Surgeon: Mark I Brunner, MD;  Location:  [facility];  Service:    • ENDOSCOPY N/A 2/9/2018    Procedure: ESOPHAGOGASTRODUODENOSCOPY;  Surgeon: Drew Estrella MD;  Location:  [facility];  Service:    • HEMORRHOIDECTOMY     • HYSTERECTOMY         MEDICATIONS: The current medication list was reviewed and reconciled.  "    Allergies:  is allergic to bactrim [sulfamethoxazole-trimethoprim]; sulfa antibiotics; vancomycin; and zosyn [piperacillin sod-tazobactam so].    Family History   Problem Relation Age of Onset   • Stroke Sister    • Stroke Brother    • Hypertension Son    • Esophageal cancer Sister    • Heart attack Mother    • Heart attack Father        Review of Systems   Constitutional: Negative for activity change, appetite change, fever and unexpected weight change.   HENT: Negative for mouth sores.    Eyes: Negative.    Respiratory: Negative for cough and shortness of breath.    Cardiovascular: Negative for chest pain and leg swelling.   Gastrointestinal: Negative for abdominal pain, blood in stool, constipation, diarrhea, nausea and vomiting.   Endocrine: Negative.    Genitourinary: Positive for hematuria. Negative for difficulty urinating, pelvic pain, urgency and vaginal bleeding.   Musculoskeletal: Negative.  Negative for back pain and neck pain.   Skin: Negative.    Allergic/Immunologic: Negative.    Neurological: Negative for weakness, light-headedness and headaches.   Hematological: Negative.    Psychiatric/Behavioral: Negative.        Physical Exam  Vital Signs: BP (!) 191/63   Pulse 58   Temp 98.6 °F (37 °C)   Resp 16   Ht 157.5 cm (62\")   Wt 65.8 kg (145 lb)   SpO2 92%   BMI 26.52 kg/m²    General Appearance:  alert, cooperative, no apparent distress, appears stated age and normal weight   Neurologic/Psychiatric: A&O x 3, gait steady, appropriate affect   HEENT:  Normocephalic, without obvious abnormality, mucous membranes moist   Neck: Supple, symmetrical, trachea midline, no adenopathy;  No thyromegaly, masses, or tenderness   Back:   Symmetric, no curvature, ROM normal, no CVA tenderness   Lungs:   Clear to auscultation bilaterally; respirations regular, even, and unlabored bilaterally   Heart:  Regular rate and rhythm, no murmurs appreciated   Abdomen:   Soft, non-tender and non-distended   Lymph " nodes: No cervical, supraclavicular, inguinal or axillary adenopathy noted   Extremities: Normal, atraumatic; no clubbing, cyanosis, or edema    Skin: No rashes, ulcers, or suspicious lesions      ECOG Performance Status: (1) Restricted in physically strenuous activity, ambulatory and able to do work of light nature      Assessment and Plan:    Diagnoses and all orders for this visit:    Waldenstrom macroglobulinemia (CMS/HCC)  -     Comprehensive Metabolic Panel; Future  -     CT Abdomen Pelvis Without Contrast; Future    Hematuria, unspecified type  -     CT Abdomen Pelvis Without Contrast; Future    Essential hypertension        The patient had 1 episode of gross hematuria this morning upon awakening when she went to the restroom.  She has had some mild spotting since, this is not associated with any pain or fevers.  She is also hypertensive currently blood pressure 191/63.  Her CBC was unremarkable, hemoglobin was normal at 12.4, platelets stable at 120,000 which is about her baseline.  Normal WBC of 5500.  CMP was pending.  Urinalysis was negative other than for protein which has been noted on previous urinalysis.  She has no neurological symptoms.  I will have her hold her ibrutinib which can cause hemorrhage and also hypertension.  I will get a CT of the abdomen and pelvis without contrast to look for kidney stone that may be the cause of her hematuria.  I will discuss this with Dr. Alvarez when she returns tomorrow and if she wants to cancel the CT scan we will do that.  I will have the patient follow-up with Dr. Alvarez in 1 week to see how she is feeling and to discuss whether or not to resume ibrutinib.      Angela Trevino, APRN   10/22/2018

## 2018-10-22 NOTE — TELEPHONE ENCOUNTER
Received call through triage line. Pt went to the restroom this morning and thought she was peeing, but had filled the toilet with blood. Unknown whether the blood was coming from bladder, vagina, or bowel. She is otherwise feeling ok, no symptoms other that being nervous now from the bleeding. Pt is on imbruvica for lymphoma. Discussed with NP. Advised pt to go to the ER and be evaluated for hemorrhaging, and hold imbruvica. She told me she is not going to the ER, and asked if she could see Dr Alvarez this week. Discussed this with the NP. Pt added onto the [facility] schedule for today at 2pm. Orders placed for stat cbc and ua.

## 2018-10-23 ENCOUNTER — DOCUMENTATION (OUTPATIENT)
Dept: ONCOLOGY | Facility: CLINIC | Age: 78
End: 2018-10-23

## 2018-10-23 NOTE — PROGRESS NOTES
I discussed visit from yesterday with Dr. Alvarez re hematuria.  Will just observe for now and Mrs. Yusuf will follow up with Dr. Alvarez next week.  CT abdomen/pelvis cancelled.

## 2018-10-31 ENCOUNTER — OFFICE VISIT (OUTPATIENT)
Dept: ONCOLOGY | Facility: CLINIC | Age: 78
End: 2018-10-31

## 2018-10-31 ENCOUNTER — SPECIALTY PHARMACY (OUTPATIENT)
Dept: ONCOLOGY | Facility: HOSPITAL | Age: 78
End: 2018-10-31

## 2018-10-31 VITALS
HEART RATE: 59 BPM | BODY MASS INDEX: 26.13 KG/M2 | RESPIRATION RATE: 16 BRPM | HEIGHT: 62 IN | SYSTOLIC BLOOD PRESSURE: 154 MMHG | WEIGHT: 142 LBS | OXYGEN SATURATION: 91 % | DIASTOLIC BLOOD PRESSURE: 80 MMHG | TEMPERATURE: 97.4 F

## 2018-10-31 DIAGNOSIS — C88.0 WALDENSTROM MACROGLOBULINEMIA (HCC): Primary | Chronic | ICD-10-CM

## 2018-10-31 DIAGNOSIS — C88.0 WALDENSTROM MACROGLOBULINEMIA (HCC): ICD-10-CM

## 2018-10-31 PROCEDURE — 99213 OFFICE O/P EST LOW 20 MIN: CPT | Performed by: INTERNAL MEDICINE

## 2018-10-31 NOTE — PROGRESS NOTES
PROBLEM LIST:  1. Waldenstrom's macroglobulinemia:  a) Splenomegaly was noted on a CT of the chest on 06/06/2015 done for weight  loss, decreased appetite and left upper quadrant pain.   b) Lab evaluation on 06/16/2015 showed an anemia with hemoglobin 10.2,  thrombocytopenia with platelets 116, a 0.6 g/dL IgM lambda monoclonal protein  was identified on SPEP, beta-2 microglobulin was 10.4, . 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   c) Treatment with Ibrutinib was started 07/2015.   2. Hypertension.   3. Essential tremor.   4. Scoliosis.     Subjective     HISTORY OF PRESENT ILLNESS:   Ms. Yusuf returns for follow-up.  She was seen in the urgent care clinic recently for hematuria.  She had a single episode of gross hematuria which then resolved.  She was advised to hold ibrutinib and is here for follow up.  She says she has not had any more bleeding.  She describes it is a significant amount of blood with her urine and there was also blood on the toilet paper when she wiped.  When she was seen in the clinic a UA showed no blood, a few white cells, no bacteria.    Past Medical History, Past Surgical History, Social History, Family History have been reviewed and are without significant changes except as mentioned.    Review of Systems   A comprehensive 14 point review of systems was performed and was negative except as mentioned.    Medications:  The current medication list was reviewed in the EMR    ALLERGIES:    Allergies   Allergen Reactions   • Bactrim [Sulfamethoxazole-Trimethoprim] Other (See Comments)     unknown   • Sulfa Antibiotics Other (See Comments)     unknown   • Vancomycin Other (See Comments)     unknown   • Zosyn [Piperacillin Sod-Tazobactam So] Other (See Comments)     unknown       Objective      /80 Comment: LUE  Pulse 59   Temp 97.4 °F (36.3 °C)  "(Temporal Artery )   Resp 16   Ht 157.5 cm (62\")   Wt 64.4 kg (142 lb)   SpO2 91% Comment: RA  BMI 25.97 kg/m²      Performance Status: 1    General: well appearing female in no acute distress  Neuro: alert and oriented  HEENT: sclera anicteric, oropharynx clear  Lymphatics: no cervical, supraclavicular, or axillary adenopathy  Cardiovascular: regular rate and rhythm, no murmurs  Lungs: clear to auscultation bilaterally  Abdomen: soft, nontender  Extremeties: trace ankle edema bilaterally  Skin: no rashes  Psych: mood and affect appropriate    Labs:     CBC from 10/22/2018 showed a hemoglobin of 12.4, platelets 120.      Assessment/Plan   Carin Yusuf is a 78 y.o. female with Waldenström's macroglobulinemia who returns for follow up.    She had a single episode of gross hematuria.  This has not happened again since.  She has been off of ibrutinib for the past week.  Ibrutinib can be associated with an increased risk of hemorrhage, so I will plan to have her resume at a reduced dose of 140 mg daily.  Otherwise she's doing well with no evidence of progressive disease.  I will see her back in 3 months.            Visit time was 15 minutes, greater than 50% spent in counseling      Kasey Alvarez MD  [facility] Hematology and Oncology    10/31/2018          CC:          "

## 2018-11-12 ENCOUNTER — OFFICE VISIT (OUTPATIENT)
Dept: FAMILY MEDICINE CLINIC | Facility: CLINIC | Age: 78
End: 2018-11-12

## 2018-11-12 VITALS
TEMPERATURE: 98.3 F | SYSTOLIC BLOOD PRESSURE: 154 MMHG | OXYGEN SATURATION: 90 % | HEIGHT: 62 IN | RESPIRATION RATE: 16 BRPM | BODY MASS INDEX: 25.91 KG/M2 | DIASTOLIC BLOOD PRESSURE: 78 MMHG | HEART RATE: 62 BPM | WEIGHT: 140.8 LBS

## 2018-11-12 DIAGNOSIS — J40 BRONCHITIS: Primary | ICD-10-CM

## 2018-11-12 PROCEDURE — 99213 OFFICE O/P EST LOW 20 MIN: CPT | Performed by: FAMILY MEDICINE

## 2018-11-12 PROCEDURE — 96372 THER/PROPH/DIAG INJ SC/IM: CPT | Performed by: FAMILY MEDICINE

## 2018-11-12 RX ORDER — LEVOFLOXACIN 500 MG/1
500 TABLET, FILM COATED ORAL DAILY
Qty: 7 TABLET | Refills: 0 | Status: SHIPPED | OUTPATIENT
Start: 2018-11-12 | End: 2018-12-07

## 2018-11-12 RX ORDER — METHYLPREDNISOLONE ACETATE 40 MG/ML
40 INJECTION, SUSPENSION INTRA-ARTICULAR; INTRALESIONAL; INTRAMUSCULAR; SOFT TISSUE ONCE
Status: COMPLETED | OUTPATIENT
Start: 2018-11-12 | End: 2018-11-12

## 2018-11-12 RX ORDER — FUROSEMIDE 20 MG/1
TABLET ORAL
Qty: 60 TABLET | Refills: 3 | Status: SHIPPED | OUTPATIENT
Start: 2018-11-12 | End: 2019-10-20

## 2018-11-12 RX ADMIN — METHYLPREDNISOLONE ACETATE 40 MG: 40 INJECTION, SUSPENSION INTRA-ARTICULAR; INTRALESIONAL; INTRAMUSCULAR; SOFT TISSUE at 15:24

## 2018-11-13 NOTE — PROGRESS NOTES
"Subjective   Carin Yusuf is a 78 y.o. female.     Cough   This is a new problem. The current episode started in the past 7 days. The problem has been gradually worsening. The problem occurs every few minutes. The cough is productive of sputum. Associated symptoms include shortness of breath. Pertinent negatives include no chest pain, chills, ear congestion, hemoptysis, myalgias or sore throat. The symptoms are aggravated by exercise and lying down.   Insomnia   Associated symptoms include arthralgias, coughing and fatigue. Pertinent negatives include no chest pain, chills, congestion, myalgias or sore throat.   Fatigue   Associated symptoms include arthralgias, coughing and fatigue. Pertinent negatives include no chest pain, chills, congestion, myalgias or sore throat.   Shortness of Breath   Pertinent negatives include no chest pain, hemoptysis, leg swelling or sore throat.        The following portions of the patient's history were reviewed and updated as appropriate: allergies, current medications, past social history and problem list.    Review of Systems   Constitutional: Positive for fatigue. Negative for chills.   HENT: Negative for congestion and sore throat.    Respiratory: Positive for cough and shortness of breath. Negative for hemoptysis.    Cardiovascular: Negative for chest pain, palpitations and leg swelling.   Gastrointestinal: Negative for constipation and diarrhea.   Endocrine: Negative for cold intolerance and heat intolerance.   Genitourinary: Negative for dysuria and hematuria.   Musculoskeletal: Positive for arthralgias. Negative for myalgias.   Skin: Negative.    Neurological: Positive for dizziness. Negative for seizures and syncope.   Psychiatric/Behavioral: The patient has insomnia.        Objective   /78   Pulse 62   Temp 98.3 °F (36.8 °C)   Resp 16   Ht 157.5 cm (62\")   Wt 63.9 kg (140 lb 12.8 oz)   SpO2 90%   BMI 25.75 kg/m²   Physical Exam   Constitutional: She is " oriented to person, place, and time. She appears well-developed and well-nourished. She is cooperative.   HENT:   Head: Normocephalic.   Right Ear: External ear normal.   Left Ear: External ear normal.   Nose: Nose normal.   Mouth/Throat: Oropharynx is clear and moist.   Eyes: Conjunctivae are normal. Pupils are equal, round, and reactive to light. No scleral icterus.   Neck: Neck supple. Carotid bruit is not present. No thyromegaly present.   Cardiovascular: Normal rate and regular rhythm.   Pulmonary/Chest: Effort normal.   Coarse upper rhonchi no rales scattered exp wheezes  Low pulse ox at home may need oxygen   Abdominal: There is no hepatosplenomegaly.   Musculoskeletal: Normal range of motion.   Neurological: She is alert and oriented to person, place, and time.   No focal deficits no lateralizing signs   Skin: Skin is warm and dry. No rash noted.   Psychiatric: She has a normal mood and affect. Cognition and memory are normal.   Nursing note and vitals reviewed.      Assessment/Plan   Problem List Items Addressed This Visit     None      Visit Diagnoses     Bronchitis    -  Primary    Relevant Medications    methylPREDNISolone acetate (DEPO-medrol) injection 40 mg (Completed)          New Medications Ordered This Visit   Medications   • furosemide (LASIX) 20 MG tablet     Sig: Take 1 or 2 daily as directed     Dispense:  60 tablet     Refill:  3   • levoFLOXacin (LEVAQUIN) 500 MG tablet     Sig: Take 1 tablet by mouth Daily.     Dispense:  7 tablet     Refill:  0   • methylPREDNISolone acetate (DEPO-medrol) injection 40 mg     Kepp folow up consider home 02 eval and may need portable concentrator.

## 2018-12-07 ENCOUNTER — OFFICE VISIT (OUTPATIENT)
Dept: FAMILY MEDICINE CLINIC | Facility: CLINIC | Age: 78
End: 2018-12-07

## 2018-12-07 VITALS
RESPIRATION RATE: 16 BRPM | HEART RATE: 59 BPM | DIASTOLIC BLOOD PRESSURE: 72 MMHG | OXYGEN SATURATION: 91 % | SYSTOLIC BLOOD PRESSURE: 140 MMHG | BODY MASS INDEX: 24.88 KG/M2 | HEIGHT: 62 IN | WEIGHT: 135.2 LBS

## 2018-12-07 DIAGNOSIS — M19.90 ARTHRITIS: ICD-10-CM

## 2018-12-07 DIAGNOSIS — I10 ESSENTIAL HYPERTENSION: Primary | ICD-10-CM

## 2018-12-07 PROCEDURE — 99213 OFFICE O/P EST LOW 20 MIN: CPT | Performed by: FAMILY MEDICINE

## 2018-12-10 NOTE — PROGRESS NOTES
"Subjective   Carin Yusuf is a 78 y.o. female.     Hypertension   This is a chronic (Stable bp at home) problem. The current episode started more than 1 year ago. The problem has been gradually improving since onset. The problem is controlled. Associated symptoms include peripheral edema. Pertinent negatives include no chest pain, headaches, palpitations or shortness of breath. Risk factors for coronary artery disease include post-menopausal state and sedentary lifestyle. Current antihypertension treatment includes calcium channel blockers, beta blockers, diuretics and angiotensin blockers. The current treatment provides mild improvement. There are no compliance problems.  There is no history of angina, kidney disease or CAD/MI.   Arthritis   Presents for follow-up visit. She complains of stiffness. She reports no joint swelling. The symptoms have been stable. Affected locations include the left shoulder and right shoulder. Pertinent negatives include no diarrhea, dry mouth, dysuria, fatigue, fever, rash or Raynaud's syndrome.        The following portions of the patient's history were reviewed and updated as appropriate: allergies, current medications, past social history and problem list.    Review of Systems   Constitutional: Negative for fatigue, fever and unexpected weight change.   HENT: Negative for congestion and sore throat.    Respiratory: Negative for cough, chest tightness and shortness of breath.    Cardiovascular: Negative for chest pain, palpitations and leg swelling.   Gastrointestinal: Negative for diarrhea and nausea.   Genitourinary: Negative for dysuria and hematuria.   Musculoskeletal: Positive for arthralgias, arthritis and stiffness. Negative for joint swelling.   Skin: Negative for color change and rash.   Neurological: Negative for dizziness, syncope, weakness and headaches.   Psychiatric/Behavioral: Negative.        Objective   /72   Pulse 59   Resp 16   Ht 157.5 cm (62\")   " Wt 61.3 kg (135 lb 3.2 oz)   SpO2 91%   BMI 24.73 kg/m²   Physical Exam   Constitutional: She is oriented to person, place, and time. She appears well-developed and well-nourished. She is cooperative.   HENT:   Head: Normocephalic.   Right Ear: External ear normal.   Left Ear: External ear normal.   Nose: Nose normal.   Mouth/Throat: Oropharynx is clear and moist.   Eyes: Conjunctivae are normal. Pupils are equal, round, and reactive to light. No scleral icterus.   Neck: Neck supple. No JVD present. Carotid bruit is not present. No thyromegaly present.   Cardiovascular: Normal rate, regular rhythm and normal heart sounds.   Pulmonary/Chest: Effort normal and breath sounds normal. She has no wheezes. She has no rales.   Abdominal: There is no hepatosplenomegaly.   Musculoskeletal: Normal range of motion. She exhibits edema.   Lymphadenopathy:     She has no cervical adenopathy.   Neurological: She is alert and oriented to person, place, and time.   No focal deficits no lateralizing signs   Skin: Skin is warm and dry. No rash noted.   Psychiatric: She has a normal mood and affect. Cognition and memory are normal.   Nursing note and vitals reviewed.      Assessment/Plan   Problem List Items Addressed This Visit        Active Problems    Essential hypertension - Primary      Other Visit Diagnoses     Arthritis              No orders of the defined types were placed in this encounter.

## 2018-12-21 ENCOUNTER — TRANSCRIBE ORDERS (OUTPATIENT)
Dept: LAB | Facility: HOSPITAL | Age: 78
End: 2018-12-21

## 2018-12-21 ENCOUNTER — LAB (OUTPATIENT)
Dept: LAB | Facility: HOSPITAL | Age: 78
End: 2018-12-21

## 2018-12-21 DIAGNOSIS — N18.30 CHRONIC KIDNEY DISEASE, STAGE III (MODERATE) (HCC): Primary | ICD-10-CM

## 2018-12-21 DIAGNOSIS — N18.30 CHRONIC KIDNEY DISEASE, STAGE III (MODERATE) (HCC): ICD-10-CM

## 2018-12-21 LAB
ALBUMIN SERPL-MCNC: 4.03 G/DL (ref 3.2–4.8)
ANION GAP SERPL CALCULATED.3IONS-SCNC: 7 MMOL/L (ref 3–11)
BACTERIA UR QL AUTO: NORMAL /HPF
BASOPHILS # BLD AUTO: 0.02 10*3/MM3 (ref 0–0.2)
BASOPHILS NFR BLD AUTO: 0.4 % (ref 0–1)
BILIRUB UR QL STRIP: NEGATIVE
BUN BLD-MCNC: 23 MG/DL (ref 9–23)
BUN/CREAT SERPL: 15 (ref 7–25)
CALCIUM SPEC-SCNC: 8.9 MG/DL (ref 8.7–10.4)
CHLORIDE SERPL-SCNC: 101 MMOL/L (ref 99–109)
CLARITY UR: CLEAR
CO2 SERPL-SCNC: 33 MMOL/L (ref 20–31)
COLOR UR: YELLOW
CREAT BLD-MCNC: 1.53 MG/DL (ref 0.6–1.3)
CREAT UR-MCNC: 107.2 MG/DL
DEPRECATED RDW RBC AUTO: 43.9 FL (ref 37–54)
EOSINOPHIL # BLD AUTO: 0.05 10*3/MM3 (ref 0–0.3)
EOSINOPHIL NFR BLD AUTO: 0.9 % (ref 0–3)
ERYTHROCYTE [DISTWIDTH] IN BLOOD BY AUTOMATED COUNT: 14.3 % (ref 11.3–14.5)
GFR SERPL CREATININE-BSD FRML MDRD: 33 ML/MIN/1.73
GLUCOSE BLD-MCNC: 110 MG/DL (ref 70–100)
GLUCOSE UR STRIP-MCNC: NEGATIVE MG/DL
HCT VFR BLD AUTO: 41 % (ref 34.5–44)
HGB BLD-MCNC: 12.2 G/DL (ref 11.5–15.5)
HGB UR QL STRIP.AUTO: NEGATIVE
HYALINE CASTS UR QL AUTO: NORMAL /LPF
IMM GRANULOCYTES # BLD AUTO: 0.01 10*3/MM3 (ref 0–0.03)
IMM GRANULOCYTES NFR BLD AUTO: 0.2 % (ref 0–0.6)
KETONES UR QL STRIP: NEGATIVE
LEUKOCYTE ESTERASE UR QL STRIP.AUTO: NEGATIVE
LYMPHOCYTES # BLD AUTO: 1.72 10*3/MM3 (ref 0.6–4.8)
LYMPHOCYTES NFR BLD AUTO: 30.9 % (ref 24–44)
MCH RBC QN AUTO: 25.3 PG (ref 27–31)
MCHC RBC AUTO-ENTMCNC: 29.8 G/DL (ref 32–36)
MCV RBC AUTO: 84.9 FL (ref 80–99)
MONOCYTES # BLD AUTO: 0.36 10*3/MM3 (ref 0–1)
MONOCYTES NFR BLD AUTO: 6.5 % (ref 0–12)
NEUTROPHILS # BLD AUTO: 3.42 10*3/MM3 (ref 1.5–8.3)
NEUTROPHILS NFR BLD AUTO: 61.3 % (ref 41–71)
NITRITE UR QL STRIP: NEGATIVE
PH UR STRIP.AUTO: 7.5 [PH] (ref 5–8)
PHOSPHATE SERPL-MCNC: 3.8 MG/DL (ref 2.4–5.1)
PLATELET # BLD AUTO: 127 10*3/MM3 (ref 150–450)
PMV BLD AUTO: 11.4 FL (ref 6–12)
POTASSIUM BLD-SCNC: 4.1 MMOL/L (ref 3.5–5.5)
PROT UR QL STRIP: ABNORMAL
PROT UR-MCNC: 60 MG/DL (ref 1–14)
RBC # BLD AUTO: 4.83 10*6/MM3 (ref 3.89–5.14)
RBC # UR: NORMAL /HPF
REF LAB TEST METHOD: NORMAL
SODIUM BLD-SCNC: 141 MMOL/L (ref 132–146)
SP GR UR STRIP: 1.02 (ref 1–1.03)
SQUAMOUS #/AREA URNS HPF: NORMAL /HPF
UROBILINOGEN UR QL STRIP: ABNORMAL
WBC NRBC COR # BLD: 5.57 10*3/MM3 (ref 3.5–10.8)
WBC UR QL AUTO: NORMAL /HPF

## 2018-12-21 PROCEDURE — 82570 ASSAY OF URINE CREATININE: CPT

## 2018-12-21 PROCEDURE — 84156 ASSAY OF PROTEIN URINE: CPT

## 2018-12-21 PROCEDURE — 85025 COMPLETE CBC W/AUTO DIFF WBC: CPT

## 2018-12-21 PROCEDURE — 36415 COLL VENOUS BLD VENIPUNCTURE: CPT

## 2018-12-21 PROCEDURE — 80069 RENAL FUNCTION PANEL: CPT

## 2018-12-21 PROCEDURE — 81001 URINALYSIS AUTO W/SCOPE: CPT | Performed by: INTERNAL MEDICINE

## 2018-12-24 RX ORDER — BENZONATATE 100 MG/1
CAPSULE ORAL
Qty: 50 CAPSULE | Refills: 0 | OUTPATIENT
Start: 2018-12-24

## 2019-01-01 ENCOUNTER — TELEPHONE (OUTPATIENT)
Dept: FAMILY MEDICINE CLINIC | Facility: CLINIC | Age: 79
End: 2019-01-01

## 2019-01-01 ENCOUNTER — OFFICE VISIT (OUTPATIENT)
Dept: FAMILY MEDICINE CLINIC | Facility: CLINIC | Age: 79
End: 2019-01-01

## 2019-01-01 VITALS
BODY MASS INDEX: 25.58 KG/M2 | OXYGEN SATURATION: 91 % | HEIGHT: 62 IN | WEIGHT: 139 LBS | DIASTOLIC BLOOD PRESSURE: 82 MMHG | SYSTOLIC BLOOD PRESSURE: 122 MMHG | HEART RATE: 59 BPM | RESPIRATION RATE: 16 BRPM

## 2019-01-01 DIAGNOSIS — R60.0 LOCALIZED EDEMA: ICD-10-CM

## 2019-01-01 DIAGNOSIS — I10 ESSENTIAL HYPERTENSION: ICD-10-CM

## 2019-01-01 DIAGNOSIS — J18.9 PNEUMONIA OF LEFT LOWER LOBE DUE TO INFECTIOUS ORGANISM: Primary | ICD-10-CM

## 2019-01-01 PROCEDURE — 99214 OFFICE O/P EST MOD 30 MIN: CPT | Performed by: FAMILY MEDICINE

## 2019-01-01 RX ORDER — ATENOLOL 100 MG/1
TABLET ORAL
Qty: 90 TABLET | Refills: 0 | Status: SHIPPED | OUTPATIENT
Start: 2019-01-01 | End: 2020-01-01

## 2019-01-01 RX ORDER — AMLODIPINE BESYLATE 10 MG/1
10 TABLET ORAL DAILY
Qty: 30 TABLET | Refills: 5 | Status: SHIPPED | OUTPATIENT
Start: 2019-01-01 | End: 2020-01-01

## 2019-01-01 RX ORDER — FUROSEMIDE 40 MG/1
40 TABLET ORAL EVERY MORNING
Qty: 30 TABLET | Refills: 0 | Status: SHIPPED | OUTPATIENT
Start: 2019-01-01 | End: 2019-01-01 | Stop reason: SDUPTHER

## 2019-01-01 RX ORDER — POTASSIUM CHLORIDE 600 MG/1
8 CAPSULE, EXTENDED RELEASE ORAL DAILY
Qty: 90 CAPSULE | Refills: 1 | Status: SHIPPED | OUTPATIENT
Start: 2019-01-01 | End: 2020-01-01 | Stop reason: SDUPTHER

## 2019-01-01 RX ORDER — IPRATROPIUM BROMIDE 42 UG/1
2 SPRAY, METERED NASAL 3 TIMES DAILY
Qty: 15 ML | Refills: 2 | Status: SHIPPED | OUTPATIENT
Start: 2019-01-01 | End: 2020-01-01 | Stop reason: SDUPTHER

## 2019-01-01 RX ORDER — FUROSEMIDE 40 MG/1
40 TABLET ORAL EVERY MORNING
Qty: 90 TABLET | Refills: 1 | Status: SHIPPED | OUTPATIENT
Start: 2019-01-01 | End: 2020-01-01 | Stop reason: SDUPTHER

## 2019-01-01 RX ORDER — POTASSIUM CHLORIDE 600 MG/1
8 CAPSULE, EXTENDED RELEASE ORAL DAILY
Qty: 30 EACH | Refills: 0 | Status: SHIPPED | OUTPATIENT
Start: 2019-01-01 | End: 2019-01-01 | Stop reason: SDUPTHER

## 2019-01-29 ENCOUNTER — APPOINTMENT (OUTPATIENT)
Dept: LAB | Facility: HOSPITAL | Age: 79
End: 2019-01-29

## 2019-01-29 ENCOUNTER — OFFICE VISIT (OUTPATIENT)
Dept: ONCOLOGY | Facility: CLINIC | Age: 79
End: 2019-01-29

## 2019-01-29 VITALS
HEIGHT: 62 IN | TEMPERATURE: 97 F | HEART RATE: 55 BPM | BODY MASS INDEX: 24.84 KG/M2 | WEIGHT: 135 LBS | OXYGEN SATURATION: 90 % | RESPIRATION RATE: 20 BRPM | DIASTOLIC BLOOD PRESSURE: 69 MMHG | SYSTOLIC BLOOD PRESSURE: 155 MMHG

## 2019-01-29 DIAGNOSIS — C88.0 WALDENSTROM MACROGLOBULINEMIA (HCC): Primary | ICD-10-CM

## 2019-01-29 LAB
ALBUMIN SERPL-MCNC: 4.02 G/DL (ref 3.2–4.8)
ALBUMIN/GLOB SERPL: 2.3 G/DL (ref 1.5–2.5)
ALP SERPL-CCNC: 87 U/L (ref 25–100)
ALT SERPL W P-5'-P-CCNC: 6 U/L (ref 7–40)
ANION GAP SERPL CALCULATED.3IONS-SCNC: 6 MMOL/L (ref 3–11)
AST SERPL-CCNC: 15 U/L (ref 0–33)
BILIRUB SERPL-MCNC: 1.1 MG/DL (ref 0.3–1.2)
BUN BLD-MCNC: 27 MG/DL (ref 9–23)
BUN/CREAT SERPL: 16.5 (ref 7–25)
CALCIUM SPEC-SCNC: 8.7 MG/DL (ref 8.7–10.4)
CHLORIDE SERPL-SCNC: 99 MMOL/L (ref 99–109)
CO2 SERPL-SCNC: 35 MMOL/L (ref 20–31)
CREAT BLD-MCNC: 1.64 MG/DL (ref 0.6–1.3)
ERYTHROCYTE [DISTWIDTH] IN BLOOD BY AUTOMATED COUNT: 16.5 % (ref 11.3–14.5)
GFR SERPL CREATININE-BSD FRML MDRD: 30 ML/MIN/1.73
GLOBULIN UR ELPH-MCNC: 1.8 GM/DL
GLUCOSE BLD-MCNC: 86 MG/DL (ref 70–100)
HCT VFR BLD AUTO: 36.6 % (ref 34.5–44)
HGB BLD-MCNC: 12.4 G/DL (ref 11.5–15.5)
LYMPHOCYTES # BLD AUTO: 1.9 10*3/MM3 (ref 0.6–4.8)
LYMPHOCYTES NFR BLD AUTO: 34.3 % (ref 24–44)
MCH RBC QN AUTO: 27.3 PG (ref 27–31)
MCHC RBC AUTO-ENTMCNC: 33.8 G/DL (ref 32–36)
MCV RBC AUTO: 80.8 FL (ref 80–99)
MONOCYTES # BLD AUTO: 0.4 10*3/MM3 (ref 0–1)
MONOCYTES NFR BLD AUTO: 6.5 % (ref 0–12)
NEUTROPHILS # BLD AUTO: 3.2 10*3/MM3 (ref 1.5–8.3)
NEUTROPHILS NFR BLD AUTO: 59.2 % (ref 41–71)
PLATELET # BLD AUTO: 149 10*3/MM3 (ref 150–450)
PMV BLD AUTO: 8.4 FL (ref 6–12)
POTASSIUM BLD-SCNC: 3.4 MMOL/L (ref 3.5–5.5)
PROT SERPL-MCNC: 5.8 G/DL (ref 5.7–8.2)
RBC # BLD AUTO: 4.53 10*6/MM3 (ref 3.89–5.14)
SODIUM BLD-SCNC: 140 MMOL/L (ref 132–146)
WBC NRBC COR # BLD: 5.4 10*3/MM3 (ref 3.5–10.8)

## 2019-01-29 PROCEDURE — 82784 ASSAY IGA/IGD/IGG/IGM EACH: CPT | Performed by: NURSE PRACTITIONER

## 2019-01-29 PROCEDURE — 85025 COMPLETE CBC W/AUTO DIFF WBC: CPT | Performed by: NURSE PRACTITIONER

## 2019-01-29 PROCEDURE — 99213 OFFICE O/P EST LOW 20 MIN: CPT | Performed by: NURSE PRACTITIONER

## 2019-01-29 PROCEDURE — 86334 IMMUNOFIX E-PHORESIS SERUM: CPT | Performed by: NURSE PRACTITIONER

## 2019-01-29 PROCEDURE — 80053 COMPREHEN METABOLIC PANEL: CPT | Performed by: NURSE PRACTITIONER

## 2019-01-29 PROCEDURE — 83883 ASSAY NEPHELOMETRY NOT SPEC: CPT | Performed by: NURSE PRACTITIONER

## 2019-01-29 PROCEDURE — 84165 PROTEIN E-PHORESIS SERUM: CPT | Performed by: NURSE PRACTITIONER

## 2019-01-29 PROCEDURE — 36415 COLL VENOUS BLD VENIPUNCTURE: CPT | Performed by: INTERNAL MEDICINE

## 2019-01-29 NOTE — PROGRESS NOTES
PROBLEM LIST:  1. Waldenstrom's macroglobulinemia:  a) Splenomegaly was noted on a CT of the chest on 06/06/2015 done for weight  loss, decreased appetite and left upper quadrant pain.   b) Lab evaluation on 06/16/2015 showed an anemia with hemoglobin 10.2,  thrombocytopenia with platelets 116, a 0.6 g/dL IgM lambda monoclonal protein  was identified on SPEP, beta-2 microglobulin was 10.4, . 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   c) Treatment with Ibrutinib was started 07/2015.   2. Hypertension.   3. Essential tremor.   4. Scoliosis.     Subjective       Chief complaint: Management of Waldenstrom's macroglobulinemia    HISTORY OF PRESENT ILLNESS:   Ms. Yusuf returns for follow-up.  She has been doing fairly well. She has some areas on her skin that are dry and scaly and one that is on her face. Her fingernails are also pulling up from the nail bed.  She is having an average of 3 stools per day but they are soft and not loose. Denies frequent infections, headaches, and night sweats.   She sees her nephrologist every 6 months and is doing well.     Past Medical History, Past Surgical History, Social History, Family History have been reviewed and are without significant changes except as mentioned.    Review of Systems   A comprehensive 14 point review of systems was performed and was negative except as mentioned.    Medications:  The current medication list was reviewed in the EMR    ALLERGIES:    Allergies   Allergen Reactions   • Bactrim [Sulfamethoxazole-Trimethoprim] Other (See Comments)     unknown   • Sulfa Antibiotics Other (See Comments)     unknown   • Vancomycin Other (See Comments)     unknown   • Zosyn [Piperacillin Sod-Tazobactam So] Other (See Comments)     unknown       Objective      /69 Comment: LUE  Pulse 55   Temp 97 °F (36.1 °C) (Temporal)   Resp  "20   Ht 157.5 cm (62\")   Wt 61.2 kg (135 lb)   SpO2 90% Comment: RA  BMI 24.69 kg/m²      Performance Status: 1    General: well appearing female in no acute distress  Neuro: alert and oriented  HEENT: sclera anicteric, oropharynx clear  Lymphatics: no cervical, supraclavicular, or axillary adenopathy  Cardiovascular: regular rate and rhythm, no murmurs  Lungs: clear to auscultation bilaterally  Abdomen: soft, nontender  Extremeties: trace ankle edema bilaterally  Skin: no rashes  Psych: mood and affect appropriate    Labs:     CBC from 12/21/2018 showed a hemoglobin of 12.2, platelets 127. CMP: Cr: 1.53, k+ 4.1,       Assessment/Plan   Carin Yusuf is a 78 y.o. female with Waldenström's macroglobulinemia who returns for follow up. She is tolerating treatment fairly well. We will continue  ibrutinib at a reduced dose of 140 mg daily.  She has no evidence of progressive disease.  I will see her back in 3 months.  I will order labs for today and prior to return.     Skin lesion: I recommended she follow up with her dermatologist.  I also recommended that she try tea tree oil for her fingernails.  She will call with any further concerns or if her conditions worsens.             I spent 15 minutes with the patient. I spent > 50% percent of this time counseling and discussing prognosis, diagnostic testing, evaluation, current status and management.      Laureen SANTOS  [facility]    1/29/2019          CC:          "

## 2019-01-30 LAB
ALBUMIN SERPL-MCNC: 3.8 G/DL (ref 2.9–4.4)
ALBUMIN/GLOB SERPL: 1.7 {RATIO} (ref 0.7–1.7)
ALPHA1 GLOB FLD ELPH-MCNC: 0.2 G/DL (ref 0–0.4)
ALPHA2 GLOB SERPL ELPH-MCNC: 0.6 G/DL (ref 0.4–1)
B-GLOBULIN SERPL ELPH-MCNC: 0.8 G/DL (ref 0.7–1.3)
GAMMA GLOB SERPL ELPH-MCNC: 0.7 G/DL (ref 0.4–1.8)
GLOBULIN SER CALC-MCNC: 2.3 G/DL (ref 2.2–3.9)
IGA SERPL-MCNC: 14 MG/DL (ref 64–422)
IGG SERPL-MCNC: 330 MG/DL (ref 700–1600)
IGM SERPL-MCNC: 804 MG/DL (ref 26–217)
INTERPRETATION SERPL IEP-IMP: ABNORMAL
KAPPA LC SERPL-MCNC: 13.1 MG/L (ref 3.3–19.4)
KAPPA LC/LAMBDA SER: 0.75 {RATIO} (ref 0.26–1.65)
LAMBDA LC FREE SERPL-MCNC: 17.5 MG/L (ref 5.7–26.3)
Lab: ABNORMAL
M-SPIKE: 0.3 G/DL
PROT SERPL-MCNC: 6.1 G/DL (ref 6–8.5)

## 2019-03-18 ENCOUNTER — OFFICE VISIT (OUTPATIENT)
Dept: FAMILY MEDICINE CLINIC | Facility: CLINIC | Age: 79
End: 2019-03-18

## 2019-03-18 VITALS
RESPIRATION RATE: 16 BRPM | WEIGHT: 138.8 LBS | DIASTOLIC BLOOD PRESSURE: 90 MMHG | OXYGEN SATURATION: 97 % | HEIGHT: 62 IN | BODY MASS INDEX: 25.54 KG/M2 | SYSTOLIC BLOOD PRESSURE: 172 MMHG | HEART RATE: 58 BPM

## 2019-03-18 DIAGNOSIS — I10 ESSENTIAL HYPERTENSION: Primary | ICD-10-CM

## 2019-03-18 DIAGNOSIS — N18.30 CHRONIC RENAL IMPAIRMENT, STAGE 3 (MODERATE) (HCC): ICD-10-CM

## 2019-03-18 LAB
ALBUMIN SERPL-MCNC: 4.09 G/DL (ref 3.2–4.8)
ALBUMIN/GLOB SERPL: 2.3 G/DL (ref 1.5–2.5)
ALP SERPL-CCNC: 99 U/L (ref 25–100)
ALT SERPL W P-5'-P-CCNC: 6 U/L (ref 7–40)
ANION GAP SERPL CALCULATED.3IONS-SCNC: 7 MMOL/L (ref 3–11)
ARTICHOKE IGE QN: 93 MG/DL (ref 0–130)
AST SERPL-CCNC: 19 U/L (ref 0–33)
BILIRUB SERPL-MCNC: 1 MG/DL (ref 0.3–1.2)
BUN BLD-MCNC: 26 MG/DL (ref 9–23)
BUN/CREAT SERPL: 18.1 (ref 7–25)
CALCIUM SPEC-SCNC: 9.3 MG/DL (ref 8.7–10.4)
CHLORIDE SERPL-SCNC: 104 MMOL/L (ref 99–109)
CHOLEST SERPL-MCNC: 147 MG/DL (ref 0–200)
CO2 SERPL-SCNC: 30 MMOL/L (ref 20–31)
CREAT BLD-MCNC: 1.44 MG/DL (ref 0.6–1.3)
GFR SERPL CREATININE-BSD FRML MDRD: 35 ML/MIN/1.73
GLOBULIN UR ELPH-MCNC: 1.8 GM/DL
GLUCOSE BLD-MCNC: 92 MG/DL (ref 70–100)
HDLC SERPL-MCNC: 51 MG/DL (ref 40–60)
POTASSIUM BLD-SCNC: 4.6 MMOL/L (ref 3.5–5.5)
PROT SERPL-MCNC: 5.9 G/DL (ref 5.7–8.2)
SODIUM BLD-SCNC: 141 MMOL/L (ref 132–146)
TRIGL SERPL-MCNC: 42 MG/DL (ref 0–150)

## 2019-03-18 PROCEDURE — 99213 OFFICE O/P EST LOW 20 MIN: CPT | Performed by: FAMILY MEDICINE

## 2019-03-18 PROCEDURE — 80053 COMPREHEN METABOLIC PANEL: CPT | Performed by: FAMILY MEDICINE

## 2019-03-18 PROCEDURE — 36415 COLL VENOUS BLD VENIPUNCTURE: CPT | Performed by: FAMILY MEDICINE

## 2019-03-18 PROCEDURE — 80061 LIPID PANEL: CPT | Performed by: FAMILY MEDICINE

## 2019-03-18 RX ORDER — AMLODIPINE BESYLATE 10 MG/1
10 TABLET ORAL DAILY
Qty: 30 TABLET | Refills: 5 | Status: SHIPPED | OUTPATIENT
Start: 2019-03-18 | End: 2019-11-07

## 2019-03-18 RX ORDER — BENZONATATE 200 MG/1
200 CAPSULE ORAL 3 TIMES DAILY PRN
Qty: 30 CAPSULE | Refills: 1 | Status: SHIPPED | OUTPATIENT
Start: 2019-03-18 | End: 2019-06-26

## 2019-03-18 RX ORDER — ALBUTEROL SULFATE 90 UG/1
2 AEROSOL, METERED RESPIRATORY (INHALATION) EVERY 6 HOURS PRN
Qty: 18 G | Refills: 3 | Status: SHIPPED | OUTPATIENT
Start: 2019-03-18 | End: 2019-11-07 | Stop reason: SDUPTHER

## 2019-03-18 NOTE — PROGRESS NOTES
"Subjective   Carin Yusuf is a 78 y.o. female.     Hypertension   This is a chronic (Stable bp at home) problem. The current episode started more than 1 year ago. The problem has been gradually improving since onset. The problem is uncontrolled. Associated symptoms include peripheral edema. Pertinent negatives include no chest pain, headaches, palpitations or shortness of breath. Risk factors for coronary artery disease include post-menopausal state and sedentary lifestyle. Current antihypertension treatment includes calcium channel blockers, beta blockers, diuretics and angiotensin blockers. The current treatment provides mild improvement. There are no compliance problems.  There is no history of angina, kidney disease or CAD/MI.   Arthritis   Presents for follow-up visit. The symptoms have been stable. Affected locations include the left shoulder and right shoulder. Pertinent negatives include no diarrhea, dry mouth, dysuria, fatigue, rash or Raynaud's syndrome.        The following portions of the patient's history were reviewed and updated as appropriate: allergies, current medications, past social history and problem list.    Review of Systems   Constitutional: Negative for fatigue and unexpected weight change.   HENT: Negative for congestion and sore throat.    Respiratory: Positive for cough. Negative for chest tightness and shortness of breath.         Clear sputum usually first thing in the morning   Cardiovascular: Negative for chest pain, palpitations and leg swelling.   Gastrointestinal: Negative for diarrhea and nausea.   Genitourinary: Negative for dysuria and hematuria.   Musculoskeletal: Positive for arthralgias and arthritis.   Skin: Negative for color change and rash.   Neurological: Positive for tremors. Negative for dizziness, syncope, weakness and headaches.       Objective   /90   Pulse 58   Resp 16   Ht 157.5 cm (62\")   Wt 63 kg (138 lb 12.8 oz)   SpO2 97%   BMI 25.39 kg/m² "   Physical Exam   Constitutional: She is oriented to person, place, and time. She appears well-developed and well-nourished. She is cooperative.   HENT:   Head: Normocephalic.   Right Ear: External ear normal.   Left Ear: External ear normal.   Nose: Nose normal.   Mouth/Throat: Oropharynx is clear and moist.   Clear postnasal drip   Eyes: Conjunctivae are normal. Pupils are equal, round, and reactive to light. No scleral icterus.   Neck: Neck supple. No JVD present. Carotid bruit is not present. No thyromegaly present.   Cardiovascular: Normal rate, regular rhythm and normal heart sounds.   Pulmonary/Chest: Effort normal and breath sounds normal. She has no wheezes. She has no rales.   Abdominal: There is no hepatosplenomegaly.   Musculoskeletal: Normal range of motion.   Lymphadenopathy:     She has no cervical adenopathy.   Neurological: She is alert and oriented to person, place, and time.   No focal deficits no lateralizing signs  Tremor unchanged   Skin: Skin is warm and dry. No rash noted.   Psychiatric: She has a normal mood and affect. Cognition and memory are normal.   Nursing note and vitals reviewed.      Assessment/Plan   Problem List Items Addressed This Visit        Active Problems    Essential hypertension - Primary    Relevant Medications    amLODIPine (NORVASC) 10 MG tablet    Other Relevant Orders    Comprehensive Metabolic Panel    Lipid Panel    Chronic renal impairment, stage 3 (moderate) (CMS/HCC)    Relevant Orders    Comprehensive Metabolic Panel          New Medications Ordered This Visit   Medications   • benzonatate (TESSALON) 200 MG capsule     Sig: Take 1 capsule by mouth 3 (Three) Times a Day As Needed for Cough.     Dispense:  30 capsule     Refill:  1   • albuterol sulfate HFA (VENTOLIN HFA) 108 (90 Base) MCG/ACT inhaler     Sig: Inhale 2 puffs Every 6 (Six) Hours As Needed for Wheezing.     Dispense:  18 g     Refill:  3   • amLODIPine (NORVASC) 10 MG tablet     Sig: Take 1 tablet  by mouth Daily.     Dispense:  30 tablet     Refill:  5     Increase amlodipine to 10 mg daily monitor blood pressure at home and record

## 2019-05-03 ENCOUNTER — LAB (OUTPATIENT)
Dept: LAB | Facility: HOSPITAL | Age: 79
End: 2019-05-03

## 2019-05-03 ENCOUNTER — OFFICE VISIT (OUTPATIENT)
Dept: ONCOLOGY | Facility: CLINIC | Age: 79
End: 2019-05-03

## 2019-05-03 VITALS
DIASTOLIC BLOOD PRESSURE: 63 MMHG | HEIGHT: 62 IN | TEMPERATURE: 98.1 F | BODY MASS INDEX: 25.4 KG/M2 | SYSTOLIC BLOOD PRESSURE: 154 MMHG | HEART RATE: 56 BPM | RESPIRATION RATE: 14 BRPM | WEIGHT: 138 LBS

## 2019-05-03 DIAGNOSIS — C88.0 WALDENSTROM MACROGLOBULINEMIA (HCC): Primary | Chronic | ICD-10-CM

## 2019-05-03 DIAGNOSIS — C88.0 WALDENSTROM MACROGLOBULINEMIA (HCC): ICD-10-CM

## 2019-05-03 LAB
ALBUMIN SERPL-MCNC: 3.9 G/DL (ref 3.5–5.2)
ALBUMIN/GLOB SERPL: 1.4 G/DL
ALP SERPL-CCNC: 102 U/L (ref 39–117)
ALT SERPL W P-5'-P-CCNC: <5 U/L (ref 1–33)
ANION GAP SERPL CALCULATED.3IONS-SCNC: 13 MMOL/L
AST SERPL-CCNC: 14 U/L (ref 1–32)
BILIRUB SERPL-MCNC: 1 MG/DL (ref 0.2–1.2)
BUN BLD-MCNC: 22 MG/DL (ref 8–23)
BUN/CREAT SERPL: 12.5 (ref 7–25)
CALCIUM SPEC-SCNC: 9 MG/DL (ref 8.6–10.5)
CHLORIDE SERPL-SCNC: 97 MMOL/L (ref 98–107)
CO2 SERPL-SCNC: 31 MMOL/L (ref 22–29)
CREAT BLD-MCNC: 1.76 MG/DL (ref 0.57–1)
ERYTHROCYTE [DISTWIDTH] IN BLOOD BY AUTOMATED COUNT: 14.9 % (ref 12.3–15.4)
GFR SERPL CREATININE-BSD FRML MDRD: 28 ML/MIN/1.73
GLOBULIN UR ELPH-MCNC: 2.7 GM/DL
GLUCOSE BLD-MCNC: 141 MG/DL (ref 65–99)
HCT VFR BLD AUTO: 38.8 % (ref 34–46.6)
HGB BLD-MCNC: 12.5 G/DL (ref 12–15.9)
LYMPHOCYTES # BLD AUTO: 2 10*3/MM3 (ref 0.7–3.1)
LYMPHOCYTES NFR BLD AUTO: 33.8 % (ref 19.6–45.3)
MCH RBC QN AUTO: 26.6 PG (ref 26.6–33)
MCHC RBC AUTO-ENTMCNC: 32.3 G/DL (ref 31.5–35.7)
MCV RBC AUTO: 82.2 FL (ref 79–97)
MONOCYTES # BLD AUTO: 0.3 10*3/MM3 (ref 0.1–0.9)
MONOCYTES NFR BLD AUTO: 4.4 % (ref 5–12)
NEUTROPHILS # BLD AUTO: 3.6 10*3/MM3 (ref 1.7–7)
NEUTROPHILS NFR BLD AUTO: 61.8 % (ref 42.7–76)
PLATELET # BLD AUTO: 159 10*3/MM3 (ref 140–450)
PMV BLD AUTO: 7.7 FL (ref 6–12)
POTASSIUM BLD-SCNC: 3.9 MMOL/L (ref 3.5–5.2)
PROT SERPL-MCNC: 6.6 G/DL (ref 6–8.5)
RBC # BLD AUTO: 4.72 10*6/MM3 (ref 3.77–5.28)
SODIUM BLD-SCNC: 141 MMOL/L (ref 136–145)
WBC NRBC COR # BLD: 5.9 10*3/MM3 (ref 3.4–10.8)

## 2019-05-03 PROCEDURE — 99213 OFFICE O/P EST LOW 20 MIN: CPT | Performed by: NURSE PRACTITIONER

## 2019-05-03 PROCEDURE — 85025 COMPLETE CBC W/AUTO DIFF WBC: CPT

## 2019-05-03 PROCEDURE — 84165 PROTEIN E-PHORESIS SERUM: CPT

## 2019-05-03 PROCEDURE — 82784 ASSAY IGA/IGD/IGG/IGM EACH: CPT

## 2019-05-03 PROCEDURE — 36415 COLL VENOUS BLD VENIPUNCTURE: CPT

## 2019-05-03 PROCEDURE — 80053 COMPREHEN METABOLIC PANEL: CPT

## 2019-05-03 PROCEDURE — 83883 ASSAY NEPHELOMETRY NOT SPEC: CPT

## 2019-05-03 PROCEDURE — 86334 IMMUNOFIX E-PHORESIS SERUM: CPT

## 2019-05-03 NOTE — PROGRESS NOTES
"  PROBLEM LIST:  1. Waldenstrom's macroglobulinemia:  a) Splenomegaly was noted on a CT of the chest on 06/06/2015 done for weight  loss, decreased appetite and left upper quadrant pain.   b) Lab evaluation on 06/16/2015 showed an anemia with hemoglobin 10.2,  thrombocytopenia with platelets 116, a 0.6 g/dL IgM lambda monoclonal protein  was identified on SPEP, beta-2 microglobulin was 10.4, . 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   c) Treatment with Ibrutinib was started 07/2015.   2. Hypertension.   3. Essential tremor.   4. Scoliosis.     Subjective       Chief complaint: Management of Waldenstrom's macroglobulinemia    HISTORY OF PRESENT ILLNESS:   Ms. Yusuf returns for follow-up.  She continues on ibrutinib at 140 mg daily. She is tolerating the medication relatively well. She continues to have mild fatigue. She has multiple bowel movements a day but reports they are formed. She denies any fevers, chills or recurrent infections.   She sees her nephrologist every 6 months and is doing well.     Past Medical History, Past Surgical History, Social History, Family History have been reviewed and are without significant changes except as mentioned.    Review of Systems   A comprehensive 14 point review of systems was performed and was negative except as mentioned.    Medications:  The current medication list was reviewed in the EMR    ALLERGIES:    Allergies   Allergen Reactions   • Bactrim [Sulfamethoxazole-Trimethoprim] Other (See Comments)     unknown   • Sulfa Antibiotics Other (See Comments)     unknown   • Vancomycin Other (See Comments)     unknown   • Zosyn [Piperacillin Sod-Tazobactam So] Other (See Comments)     unknown       Objective      /63   Pulse 56   Temp 98.1 °F (36.7 °C) (Temporal)   Resp 14   Ht 157.5 cm (62\")   Wt 62.6 kg (138 lb)   BMI 25.24 " kg/m²      Performance Status: 1    General: well appearing female in no acute distress  Neuro: alert and oriented  HEENT: sclera anicteric, oropharynx clear  Lymphatics: no cervical, supraclavicular, or axillary adenopathy  Cardiovascular: regular rate and rhythm, no murmurs  Lungs: clear to auscultation bilaterally  Abdomen: soft, nontender  Extremeties: trace ankle edema bilaterally  Skin: no rashes  Psych: mood and affect appropriate    Labs:     CBC from today showed a hemoglobin of 12.5, platelets 159,000. CMP: Glucose 141, creatinine 1.6, BUN 22, Glucose 141.  RENARD from 1/29/2019: m-spike 0.3 with IgM 804.       Assessment/Plan   Carin Yusuf is a 78 y.o. female with Waldenström's macroglobulinemia who returns for follow up. She is tolerating treatment fairly well. We will continue ibrutinib at a reduced dose of 140 mg daily.  She has no evidence of progressive disease.  I will see her back in 3 months.  I will order labs for today and prior to return.                  I spent 15 minutes with the patient. I spent > 50% percent of this time counseling and discussing prognosis, diagnostic testing, evaluation, current status and management.      DANIELLE Devlin  [facility]    5/3/2019          CC:

## 2019-05-06 LAB
ALBUMIN SERPL-MCNC: 3.6 G/DL (ref 2.9–4.4)
ALBUMIN/GLOB SERPL: 1.3 {RATIO} (ref 0.7–1.7)
ALPHA1 GLOB FLD ELPH-MCNC: 0.3 G/DL (ref 0–0.4)
ALPHA2 GLOB SERPL ELPH-MCNC: 0.7 G/DL (ref 0.4–1)
B-GLOBULIN SERPL ELPH-MCNC: 0.9 G/DL (ref 0.7–1.3)
GAMMA GLOB SERPL ELPH-MCNC: 0.9 G/DL (ref 0.4–1.8)
GLOBULIN SER CALC-MCNC: 2.9 G/DL (ref 2.2–3.9)
IGA SERPL-MCNC: 16 MG/DL (ref 64–422)
IGG SERPL-MCNC: 323 MG/DL (ref 700–1600)
IGM SERPL-MCNC: 1041 MG/DL (ref 26–217)
INTERPRETATION SERPL IEP-IMP: ABNORMAL
KAPPA LC SERPL-MCNC: 13 MG/L (ref 3.3–19.4)
KAPPA LC/LAMBDA SER: 0.45 {RATIO} (ref 0.26–1.65)
LAMBDA LC FREE SERPL-MCNC: 28.9 MG/L (ref 5.7–26.3)
Lab: ABNORMAL
M-SPIKE: 0.5 G/DL
PROT SERPL-MCNC: 6.5 G/DL (ref 6–8.5)

## 2019-05-07 ENCOUNTER — TELEPHONE (OUTPATIENT)
Dept: ONCOLOGY | Facility: CLINIC | Age: 79
End: 2019-05-07

## 2019-05-07 ENCOUNTER — SPECIALTY PHARMACY (OUTPATIENT)
Dept: ONCOLOGY | Facility: HOSPITAL | Age: 79
End: 2019-05-07

## 2019-05-07 NOTE — PROGRESS NOTES
Received notification from  plan to increase Ibrutinib dose to 280mg PO daily. Reached out to enedelia LEOS and spoke with aCrlita at [phone] (patient fills through [facility]) to provide verbal order  with updated tablet strength. Ibrutinib 280mg PO daily #28, delivery scheduled for 5/10/19 per Kenna.

## 2019-05-07 NOTE — TELEPHONE ENCOUNTER
Reviewed labs with Dr. Alvarez. Her M spike has increased to 0.5 and IgM 1041.  Dr. Alvarez would like to have her go back up to 280 mg daily to see if that stabilizes her IgM. Pharmacist is ordering new prescription. Patient verbalizes understanding of education provided. I did instruct her to call for any new bleeding or easy bruising. Any fevers or chills.

## 2019-05-08 ENCOUNTER — TELEPHONE (OUTPATIENT)
Dept: ONCOLOGY | Facility: CLINIC | Age: 79
End: 2019-05-08

## 2019-05-08 NOTE — TELEPHONE ENCOUNTER
Patient had 28 left over 280 mg tablets from October 2018 and wanted to know if she could use them before the new supply came. I instructed her that would be acceptable to use first.

## 2019-05-29 RX ORDER — ATENOLOL 100 MG/1
TABLET ORAL
Qty: 90 TABLET | Refills: 0 | Status: SHIPPED | OUTPATIENT
Start: 2019-05-29 | End: 2019-09-03 | Stop reason: SDUPTHER

## 2019-06-21 ENCOUNTER — LAB (OUTPATIENT)
Dept: LAB | Facility: HOSPITAL | Age: 79
End: 2019-06-21

## 2019-06-21 ENCOUNTER — TRANSCRIBE ORDERS (OUTPATIENT)
Dept: LAB | Facility: HOSPITAL | Age: 79
End: 2019-06-21

## 2019-06-21 DIAGNOSIS — N18.30 CHRONIC KIDNEY DISEASE, STAGE III (MODERATE) (HCC): Primary | ICD-10-CM

## 2019-06-21 DIAGNOSIS — N18.30 CHRONIC KIDNEY DISEASE, STAGE III (MODERATE) (HCC): ICD-10-CM

## 2019-06-21 LAB
25(OH)D3 SERPL-MCNC: 50.4 NG/ML (ref 30–100)
ALBUMIN SERPL-MCNC: 4 G/DL (ref 3.5–5.2)
ANION GAP SERPL CALCULATED.3IONS-SCNC: 15.6 MMOL/L
BACTERIA UR QL AUTO: ABNORMAL /HPF
BASOPHILS # BLD AUTO: 0.04 10*3/MM3 (ref 0–0.2)
BASOPHILS NFR BLD AUTO: 0.7 % (ref 0–1.5)
BILIRUB UR QL STRIP: NEGATIVE
BUN BLD-MCNC: 27 MG/DL (ref 8–23)
BUN/CREAT SERPL: 16.6 (ref 7–25)
CALCIUM SPEC-SCNC: 8.7 MG/DL (ref 8.6–10.5)
CHLORIDE SERPL-SCNC: 94 MMOL/L (ref 98–107)
CLARITY UR: CLEAR
CO2 SERPL-SCNC: 29.4 MMOL/L (ref 22–29)
COLOR UR: YELLOW
CREAT BLD-MCNC: 1.63 MG/DL (ref 0.57–1)
CREAT UR-MCNC: 113.5 MG/DL
DEPRECATED RDW RBC AUTO: 42.7 FL (ref 37–54)
EOSINOPHIL # BLD AUTO: 0.09 10*3/MM3 (ref 0–0.4)
EOSINOPHIL NFR BLD AUTO: 1.5 % (ref 0.3–6.2)
ERYTHROCYTE [DISTWIDTH] IN BLOOD BY AUTOMATED COUNT: 13.7 % (ref 12.3–15.4)
GFR SERPL CREATININE-BSD FRML MDRD: 30 ML/MIN/1.73
GLUCOSE BLD-MCNC: 94 MG/DL (ref 65–99)
GLUCOSE UR STRIP-MCNC: NEGATIVE MG/DL
HCT VFR BLD AUTO: 41.3 % (ref 34–46.6)
HGB BLD-MCNC: 12.4 G/DL (ref 12–15.9)
HGB UR QL STRIP.AUTO: NEGATIVE
HYALINE CASTS UR QL AUTO: ABNORMAL /LPF
IMM GRANULOCYTES # BLD AUTO: 0.02 10*3/MM3 (ref 0–0.05)
IMM GRANULOCYTES NFR BLD AUTO: 0.3 % (ref 0–0.5)
KETONES UR QL STRIP: NEGATIVE
LEUKOCYTE ESTERASE UR QL STRIP.AUTO: ABNORMAL
LYMPHOCYTES # BLD AUTO: 2.31 10*3/MM3 (ref 0.7–3.1)
LYMPHOCYTES NFR BLD AUTO: 38.2 % (ref 19.6–45.3)
MCH RBC QN AUTO: 25.9 PG (ref 26.6–33)
MCHC RBC AUTO-ENTMCNC: 30 G/DL (ref 31.5–35.7)
MCV RBC AUTO: 86.4 FL (ref 79–97)
MONOCYTES # BLD AUTO: 0.34 10*3/MM3 (ref 0.1–0.9)
MONOCYTES NFR BLD AUTO: 5.6 % (ref 5–12)
NEUTROPHILS # BLD AUTO: 3.25 10*3/MM3 (ref 1.7–7)
NEUTROPHILS NFR BLD AUTO: 53.7 % (ref 42.7–76)
NITRITE UR QL STRIP: NEGATIVE
NRBC BLD AUTO-RTO: 0 /100 WBC (ref 0–0.2)
PH UR STRIP.AUTO: 6.5 [PH] (ref 5–8)
PHOSPHATE SERPL-MCNC: 4 MG/DL (ref 2.5–4.5)
PLATELET # BLD AUTO: 134 10*3/MM3 (ref 140–450)
PMV BLD AUTO: 11.9 FL (ref 6–12)
POTASSIUM BLD-SCNC: 4.4 MMOL/L (ref 3.5–5.2)
PROT UR QL STRIP: ABNORMAL
PROT UR-MCNC: 38 MG/DL
RBC # BLD AUTO: 4.78 10*6/MM3 (ref 3.77–5.28)
RBC # UR: ABNORMAL /HPF
REF LAB TEST METHOD: ABNORMAL
SODIUM BLD-SCNC: 139 MMOL/L (ref 136–145)
SP GR UR STRIP: 1.01 (ref 1–1.03)
SQUAMOUS #/AREA URNS HPF: ABNORMAL /HPF
UROBILINOGEN UR QL STRIP: ABNORMAL
WBC NRBC COR # BLD: 6.05 10*3/MM3 (ref 3.4–10.8)
WBC UR QL AUTO: ABNORMAL /HPF

## 2019-06-21 PROCEDURE — 36415 COLL VENOUS BLD VENIPUNCTURE: CPT

## 2019-06-21 PROCEDURE — 85025 COMPLETE CBC W/AUTO DIFF WBC: CPT

## 2019-06-21 PROCEDURE — 82306 VITAMIN D 25 HYDROXY: CPT

## 2019-06-21 PROCEDURE — 80069 RENAL FUNCTION PANEL: CPT

## 2019-06-21 PROCEDURE — 81001 URINALYSIS AUTO W/SCOPE: CPT | Performed by: INTERNAL MEDICINE

## 2019-06-21 PROCEDURE — 84156 ASSAY OF PROTEIN URINE: CPT

## 2019-06-21 PROCEDURE — 82570 ASSAY OF URINE CREATININE: CPT

## 2019-06-26 ENCOUNTER — OFFICE VISIT (OUTPATIENT)
Dept: FAMILY MEDICINE CLINIC | Facility: CLINIC | Age: 79
End: 2019-06-26

## 2019-06-26 VITALS
DIASTOLIC BLOOD PRESSURE: 78 MMHG | TEMPERATURE: 97.5 F | OXYGEN SATURATION: 95 % | SYSTOLIC BLOOD PRESSURE: 126 MMHG | HEART RATE: 52 BPM | BODY MASS INDEX: 25.1 KG/M2 | WEIGHT: 136.4 LBS | HEIGHT: 62 IN | RESPIRATION RATE: 16 BRPM

## 2019-06-26 DIAGNOSIS — Z00.00 MEDICARE ANNUAL WELLNESS VISIT, SUBSEQUENT: Primary | ICD-10-CM

## 2019-06-26 DIAGNOSIS — I10 ESSENTIAL HYPERTENSION: ICD-10-CM

## 2019-06-26 DIAGNOSIS — E55.9 VITAMIN D DEFICIENCY: ICD-10-CM

## 2019-06-26 LAB
ALBUMIN SERPL-MCNC: 4.2 G/DL (ref 3.5–5.2)
ALBUMIN/GLOB SERPL: 1.6 G/DL
ALP SERPL-CCNC: 82 U/L (ref 39–117)
ALT SERPL W P-5'-P-CCNC: <5 U/L (ref 1–33)
ANION GAP SERPL CALCULATED.3IONS-SCNC: 13.8 MMOL/L (ref 5–15)
AST SERPL-CCNC: 10 U/L (ref 1–32)
BILIRUB BLD-MCNC: NEGATIVE MG/DL
BILIRUB SERPL-MCNC: 0.9 MG/DL (ref 0.2–1.2)
BUN BLD-MCNC: 23 MG/DL (ref 8–23)
BUN/CREAT SERPL: 14.2 (ref 7–25)
CALCIUM SPEC-SCNC: 9.7 MG/DL (ref 8.6–10.5)
CHLORIDE SERPL-SCNC: 97 MMOL/L (ref 98–107)
CHOLEST SERPL-MCNC: 151 MG/DL (ref 0–200)
CLARITY, POC: CLEAR
CO2 SERPL-SCNC: 31.2 MMOL/L (ref 22–29)
COLOR UR: YELLOW
CREAT BLD-MCNC: 1.62 MG/DL (ref 0.57–1)
DEPRECATED RDW RBC AUTO: 43.8 FL (ref 37–54)
ERYTHROCYTE [DISTWIDTH] IN BLOOD BY AUTOMATED COUNT: 14.3 % (ref 12.3–15.4)
GFR SERPL CREATININE-BSD FRML MDRD: 31 ML/MIN/1.73
GLOBULIN UR ELPH-MCNC: 2.7 GM/DL
GLUCOSE BLD-MCNC: 96 MG/DL (ref 65–99)
GLUCOSE UR STRIP-MCNC: NEGATIVE MG/DL
HCT VFR BLD AUTO: 43.2 % (ref 34–46.6)
HDLC SERPL-MCNC: 59 MG/DL (ref 40–60)
HGB BLD-MCNC: 13.3 G/DL (ref 12–15.9)
KETONES UR QL: NEGATIVE
LDLC SERPL CALC-MCNC: 81 MG/DL (ref 0–100)
LDLC/HDLC SERPL: 1.37 {RATIO}
LEUKOCYTE EST, POC: NEGATIVE
MCH RBC QN AUTO: 26.3 PG (ref 26.6–33)
MCHC RBC AUTO-ENTMCNC: 30.8 G/DL (ref 31.5–35.7)
MCV RBC AUTO: 85.4 FL (ref 79–97)
NITRITE UR-MCNC: NEGATIVE MG/ML
PH UR: 6.5 [PH] (ref 5–8)
PLATELET # BLD AUTO: 143 10*3/MM3 (ref 140–450)
PMV BLD AUTO: 11.7 FL (ref 6–12)
POTASSIUM BLD-SCNC: 4.4 MMOL/L (ref 3.5–5.2)
PROT SERPL-MCNC: 6.9 G/DL (ref 6–8.5)
PROT UR STRIP-MCNC: ABNORMAL MG/DL
RBC # BLD AUTO: 5.06 10*6/MM3 (ref 3.77–5.28)
RBC # UR STRIP: ABNORMAL /UL
SODIUM BLD-SCNC: 142 MMOL/L (ref 136–145)
SP GR UR: 1.02 (ref 1–1.03)
TRIGL SERPL-MCNC: 56 MG/DL (ref 0–150)
TSH SERPL DL<=0.05 MIU/L-ACNC: 9.34 MIU/ML (ref 0.27–4.2)
UROBILINOGEN UR QL: NORMAL
VLDLC SERPL-MCNC: 11.2 MG/DL (ref 5–40)
WBC NRBC COR # BLD: 7.33 10*3/MM3 (ref 3.4–10.8)

## 2019-06-26 PROCEDURE — 36415 COLL VENOUS BLD VENIPUNCTURE: CPT | Performed by: FAMILY MEDICINE

## 2019-06-26 PROCEDURE — 81003 URINALYSIS AUTO W/O SCOPE: CPT | Performed by: FAMILY MEDICINE

## 2019-06-26 PROCEDURE — 84443 ASSAY THYROID STIM HORMONE: CPT | Performed by: FAMILY MEDICINE

## 2019-06-26 PROCEDURE — 85027 COMPLETE CBC AUTOMATED: CPT | Performed by: FAMILY MEDICINE

## 2019-06-26 PROCEDURE — G0439 PPPS, SUBSEQ VISIT: HCPCS | Performed by: FAMILY MEDICINE

## 2019-06-26 PROCEDURE — 80053 COMPREHEN METABOLIC PANEL: CPT | Performed by: FAMILY MEDICINE

## 2019-06-26 PROCEDURE — 93000 ELECTROCARDIOGRAM COMPLETE: CPT | Performed by: FAMILY MEDICINE

## 2019-06-26 PROCEDURE — 80061 LIPID PANEL: CPT | Performed by: FAMILY MEDICINE

## 2019-06-26 RX ORDER — LOSARTAN POTASSIUM 100 MG/1
100 TABLET ORAL DAILY
Qty: 90 TABLET | Refills: 3 | Status: SHIPPED | OUTPATIENT
Start: 2019-06-26 | End: 2020-01-01

## 2019-06-26 NOTE — PROGRESS NOTES
QUICK REFERENCE INFORMATION:  The ABCs of the Annual Wellness Visit    Subsequent Medicare Wellness Visit     HEALTH RISK ASSESSMENT    : 1940    Recent Hospitalizations:  No hospitalization(s) within the last year..  ccc      Current Medical Providers:  Patient Care Team:  Hilario Vasques MD as PCP - General  Hilario Vasques MD as PCP - Family Medicine  Hilario Vasques MD as PCP - Claims Attributed        Smoking Status:  Social History     Tobacco Use   Smoking Status Never Smoker   Smokeless Tobacco Never Used       Alcohol Consumption:  Social History     Substance and Sexual Activity   Alcohol Use No       Depression Screen:   PHQ-2/PHQ-9 Depression Screening 2019   Little interest or pleasure in doing things 0   Feeling down, depressed, or hopeless 0   Total Score 0       Health Habits and Functional and Cognitive Screening:  Functional & Cognitive Status 2019   Do you have difficulty preparing food and eating? No   Do you have difficulty bathing yourself, getting dressed or grooming yourself? No   Do you have difficulty using the toilet? No   Do you have difficulty moving around from place to place? No   Do you have trouble with steps or getting out of a bed or a chair? No   In the past year have you fallen or experienced a near fall? No   Current Diet Well Balanced Diet   Dental Exam Not up to date   Eye Exam Not up to date   Exercise (times per week) 5 times per week   Current Exercise Activities Include Housecleaning   Do you need help using the phone?  No   Are you deaf or do you have serious difficulty hearing?  No   Do you need help with transportation? No   Do you need help shopping? No   Do you need help preparing meals?  No   Do you need help with housework?  No   Do you need help with laundry? No   Do you need help taking your medications? No   Do you need help managing money? No   Do you ever drive or ride in a car without wearing a seat belt? No   Have you felt unusual  stress, anger or loneliness in the last month? No   Who do you live with? Child   If you need help, do you have trouble finding someone available to you? No   Do you have difficulty concentrating, remembering or making decisions? Yes           Does the patient have evidence of cognitive impairment? No    Asiprin use counseling: Taking ASA appropriately as indicated      Recent Lab Results:          Lab Results   Component Value Date    CHOL 147 03/18/2019    TRIG 42 03/18/2019    HDL 51 03/18/2019           Age-appropriate Screening Schedule:  Refer to the list below for future screening recommendations based on patient's age, sex and/or medical conditions. Orders for these recommended tests are listed in the plan section. The patient has been provided with a written plan.    Health Maintenance   Topic Date Due   • ZOSTER VACCINE (2 of 2) 12/01/2009   • PNEUMOCOCCAL VACCINES (65+ LOW/MEDIUM RISK) (2 of 2 - PPSV23) 02/12/2016   • INFLUENZA VACCINE  08/01/2019   • TDAP/TD VACCINES (2 - Td) 05/15/2022        Subjective   History of Present Illness    Carin Yusuf is a 78 y.o. female who presents for an Annual Wellness Visit.    The following portions of the patient's history were reviewed and updated as appropriate: allergies, current medications, past family history, past medical history, past social history, past surgical history and problem list.    Outpatient Medications Prior to Visit   Medication Sig Dispense Refill   • albuterol (PROVENTIL) (2.5 MG/3ML) 0.083% nebulizer solution INHALE CONTENTS OF 1 VIAL IN NEBULIZER EVERY 8 HOURS AS NEEDED FOR WHEEZING 225 mL 3   • albuterol sulfate HFA (VENTOLIN HFA) 108 (90 Base) MCG/ACT inhaler Inhale 2 puffs Every 6 (Six) Hours As Needed for Wheezing. 18 g 3   • amLODIPine (NORVASC) 10 MG tablet Take 1 tablet by mouth Daily. 30 tablet 5   • atenolol (TENORMIN) 100 MG tablet TAKE 1 TABLET BY MOUTH EVERY MORNING 90 tablet 0   • RAYSA ASPIRIN PO Take 81 mg by mouth daily.      • Calcium-Vitamin D-Vitamin K (VIACTIV PO) Take 1 tablet by mouth daily.     • furosemide (LASIX) 20 MG tablet Take 1 or 2 daily as directed 60 tablet 3   • ibrutinib (IMBRUVICA) 140 MG tablet tablet Take 1 tablet by mouth Daily. (Patient taking differently: Take 280 mg by mouth Daily.) 28 tablet 11   • montelukast (SINGULAIR) 10 MG tablet Take 1 tablet by mouth Every Night. 90 tablet 1   • Multiple Vitamins-Minerals (MULTIVITAMIN ADULT PO) Take 1 tablet by mouth Daily.     • NASAL SALINE NA into the nostril(s) as directed by provider.     • benzonatate (TESSALON) 200 MG capsule Take 1 capsule by mouth 3 (Three) Times a Day As Needed for Cough. 30 capsule 1   • chlorthalidone (HYGROTON) 25 MG tablet Take 25 mg by mouth Daily. Take 1/2 daily .     • Fluticasone Propionate, Inhal, 100 MCG/BLIST aerosol powder  Inhale 1 puff 2 (Two) Times a Day. 60 each 3   • losartan (COZAAR) 100 MG tablet Take 1 tablet by mouth Daily. 90 tablet 3     No facility-administered medications prior to visit.        Patient Active Problem List   Diagnosis   • Waldenstrom macroglobulinemia (CMS/HCC)   • Gastroesophageal reflux disease   • Essential hypertension   • Scoliosis   • Hereditary essential tremor   • Prediabetes   • Chronic renal impairment, stage 3 (moderate) (CMS/HCC)   • LUQ abdominal pain   • Asthma   • Splenomegaly   • Thrombocytopenia (CMS/HCC)   • Gastric wall thickening       Advance Care Planning:  unknown    Identification of Risk Factors:  Risk factors include: cardiovascular risk.    Review of Systems    Compared to one year ago, the patient feels her physical health is the same.  Compared to one year ago, the patient feels her mental health is the same.    Objective     Physical Exam   Constitutional: She is oriented to person, place, and time. She appears well-developed and well-nourished. She is cooperative.   HENT:   Head: Normocephalic.   Right Ear: External ear normal.   Left Ear: External ear normal.   Nose:  "Nose normal.   Mouth/Throat: Oropharynx is clear and moist.   Eyes: Conjunctivae are normal. Pupils are equal, round, and reactive to light. No scleral icterus.   Neck: Neck supple. No JVD present. Carotid bruit is not present. No thyromegaly present.   Cardiovascular: Normal rate and regular rhythm.   Pulmonary/Chest: Effort normal and breath sounds normal.   Abdominal: Soft. Bowel sounds are normal. She exhibits no mass. There is no hepatosplenomegaly. No hernia.   Genitourinary:   Genitourinary Comments: Exam reveals breast atrophy there are no palpable lymph nodes or masses and no skin changes   Musculoskeletal: Normal range of motion.   Significant dorsal kyphosis and curvature of the spine   Lymphadenopathy:     She has no cervical adenopathy.   Neurological: She is alert and oriented to person, place, and time. No cranial nerve deficit. Coordination normal.   Fine tremor present   Skin: Skin is warm and dry. No rash noted.   Psychiatric: She has a normal mood and affect. Her behavior is normal. Judgment and thought content normal. Cognition and memory are normal.   Nursing note and vitals reviewed.      Vitals:    06/26/19 0823   BP: 126/78   Pulse: 52   Resp: 16   Temp: 97.5 °F (36.4 °C)   SpO2: 95%   Weight: 61.9 kg (136 lb 6.4 oz)   Height: 157.5 cm (62\")   PainSc: 0-No pain       Patient's Body mass index is 24.95 kg/m². BMI is within normal parameters. No follow-up required..      Assessment/Plan   Patient Self-Management and Personalized Health Advice  The patient has been provided with information about: prevention of cardiac or vascular disease and fall prevention and preventive services including:   · Diabetes screening, see lab orders, Fall Risk assessment done, Glaucoma screening recommended, Screening electrocardiogram.    Visit Diagnoses:    ICD-10-CM ICD-9-CM   1. Medicare annual wellness visit, subsequent Z00.00 V70.0   2. Vitamin D deficiency E55.9 268.9   3. Essential hypertension I10 401.9 "       Orders Placed This Encounter   Procedures   • Comprehensive Metabolic Panel   • Lipid Panel   • TSH   • CBC (No Diff)   • POC Urinalysis Dipstick, Automated   • ECG 12 Lead     Order Specific Question:   Reason for Exam:     Answer:   green       Outpatient Encounter Medications as of 6/26/2019   Medication Sig Dispense Refill   • albuterol (PROVENTIL) (2.5 MG/3ML) 0.083% nebulizer solution INHALE CONTENTS OF 1 VIAL IN NEBULIZER EVERY 8 HOURS AS NEEDED FOR WHEEZING 225 mL 3   • albuterol sulfate HFA (VENTOLIN HFA) 108 (90 Base) MCG/ACT inhaler Inhale 2 puffs Every 6 (Six) Hours As Needed for Wheezing. 18 g 3   • amLODIPine (NORVASC) 10 MG tablet Take 1 tablet by mouth Daily. 30 tablet 5   • atenolol (TENORMIN) 100 MG tablet TAKE 1 TABLET BY MOUTH EVERY MORNING 90 tablet 0   • RAYSA ASPIRIN PO Take 81 mg by mouth daily.     • Calcium-Vitamin D-Vitamin K (VIACTIV PO) Take 1 tablet by mouth daily.     • furosemide (LASIX) 20 MG tablet Take 1 or 2 daily as directed 60 tablet 3   • ibrutinib (IMBRUVICA) 140 MG tablet tablet Take 1 tablet by mouth Daily. (Patient taking differently: Take 280 mg by mouth Daily.) 28 tablet 11   • losartan (COZAAR) 100 MG tablet Take 1 tablet by mouth Daily. 90 tablet 3   • montelukast (SINGULAIR) 10 MG tablet Take 1 tablet by mouth Every Night. 90 tablet 1   • Multiple Vitamins-Minerals (MULTIVITAMIN ADULT PO) Take 1 tablet by mouth Daily.     • NASAL SALINE NA into the nostril(s) as directed by provider.     • [DISCONTINUED] benzonatate (TESSALON) 200 MG capsule Take 1 capsule by mouth 3 (Three) Times a Day As Needed for Cough. 30 capsule 1   • [DISCONTINUED] chlorthalidone (HYGROTON) 25 MG tablet Take 25 mg by mouth Daily. Take 1/2 daily .     • [DISCONTINUED] Fluticasone Propionate, Inhal, 100 MCG/BLIST aerosol powder  Inhale 1 puff 2 (Two) Times a Day. 60 each 3   • [DISCONTINUED] losartan (COZAAR) 100 MG tablet Take 1 tablet by mouth Daily. 90 tablet 3     No facility-administered  encounter medications on file as of 6/26/2019.        Reviewed use of high risk medication in the elderly: yes  Reviewed for potential of harmful drug interactions in the elderly: not applicable    Follow Up:  Return in about 3 months (around 9/26/2019) for Recheck.     An After Visit Summary and PPPS with all of these plans were given to the patient.          ECG 12 Lead  Date/Time: 6/26/2019 5:10 PM  Performed by: Hilario Vasques MD  Authorized by: Hilario Vasques MD   Comparison: compared with previous ECG   Similar to previous ECG  Rhythm: sinus rhythm  Rate: bradycardic  BPM: 46  Conduction: conduction normal  ST Segments: ST segments normal  T Waves: T waves normal  Other: no other findings    Clinical impression: non-specific ECG        see form

## 2019-08-01 ENCOUNTER — LAB (OUTPATIENT)
Dept: LAB | Facility: HOSPITAL | Age: 79
End: 2019-08-01

## 2019-08-01 ENCOUNTER — OFFICE VISIT (OUTPATIENT)
Dept: ONCOLOGY | Facility: CLINIC | Age: 79
End: 2019-08-01

## 2019-08-01 VITALS
BODY MASS INDEX: 24.84 KG/M2 | WEIGHT: 135 LBS | HEART RATE: 54 BPM | HEIGHT: 62 IN | RESPIRATION RATE: 16 BRPM | TEMPERATURE: 97.4 F | SYSTOLIC BLOOD PRESSURE: 159 MMHG | OXYGEN SATURATION: 96 % | DIASTOLIC BLOOD PRESSURE: 68 MMHG

## 2019-08-01 DIAGNOSIS — C88.0 WALDENSTROM MACROGLOBULINEMIA (HCC): ICD-10-CM

## 2019-08-01 DIAGNOSIS — R16.1 SPLENOMEGALY: Primary | ICD-10-CM

## 2019-08-01 DIAGNOSIS — C88.0 WALDENSTROM MACROGLOBULINEMIA (HCC): Chronic | ICD-10-CM

## 2019-08-01 LAB
ALBUMIN SERPL-MCNC: 4 G/DL (ref 3.5–5.2)
ALBUMIN/GLOB SERPL: 1.7 G/DL
ALP SERPL-CCNC: 93 U/L (ref 39–117)
ALT SERPL W P-5'-P-CCNC: <5 U/L (ref 1–33)
ANION GAP SERPL CALCULATED.3IONS-SCNC: 15 MMOL/L (ref 5–15)
AST SERPL-CCNC: 14 U/L (ref 1–32)
BILIRUB SERPL-MCNC: 0.8 MG/DL (ref 0.2–1.2)
BUN BLD-MCNC: 25 MG/DL (ref 8–23)
BUN/CREAT SERPL: 14.7 (ref 7–25)
CALCIUM SPEC-SCNC: 8.9 MG/DL (ref 8.6–10.5)
CHLORIDE SERPL-SCNC: 98 MMOL/L (ref 98–107)
CO2 SERPL-SCNC: 31 MMOL/L (ref 22–29)
CREAT BLD-MCNC: 1.7 MG/DL (ref 0.57–1)
ERYTHROCYTE [DISTWIDTH] IN BLOOD BY AUTOMATED COUNT: 15.3 % (ref 12.3–15.4)
GFR SERPL CREATININE-BSD FRML MDRD: 29 ML/MIN/1.73
GLOBULIN UR ELPH-MCNC: 2.3 GM/DL
GLUCOSE BLD-MCNC: 120 MG/DL (ref 65–99)
HCT VFR BLD AUTO: 39.1 % (ref 34–46.6)
HGB BLD-MCNC: 12.5 G/DL (ref 12–15.9)
LYMPHOCYTES # BLD AUTO: 1.6 10*3/MM3 (ref 0.7–3.1)
LYMPHOCYTES NFR BLD AUTO: 27.6 % (ref 19.6–45.3)
MCH RBC QN AUTO: 26 PG (ref 26.6–33)
MCHC RBC AUTO-ENTMCNC: 31.9 G/DL (ref 31.5–35.7)
MCV RBC AUTO: 81.5 FL (ref 79–97)
MONOCYTES # BLD AUTO: 0.2 10*3/MM3 (ref 0.1–0.9)
MONOCYTES NFR BLD AUTO: 4.1 % (ref 5–12)
NEUTROPHILS # BLD AUTO: 4 10*3/MM3 (ref 1.7–7)
NEUTROPHILS NFR BLD AUTO: 68.3 % (ref 42.7–76)
PLATELET # BLD AUTO: 160 10*3/MM3 (ref 140–450)
PMV BLD AUTO: 8.6 FL (ref 6–12)
POTASSIUM BLD-SCNC: 3.6 MMOL/L (ref 3.5–5.2)
PROT SERPL-MCNC: 6.3 G/DL (ref 6–8.5)
RBC # BLD AUTO: 4.8 10*6/MM3 (ref 3.77–5.28)
SODIUM BLD-SCNC: 144 MMOL/L (ref 136–145)
WBC NRBC COR # BLD: 5.9 10*3/MM3 (ref 3.4–10.8)

## 2019-08-01 PROCEDURE — 99213 OFFICE O/P EST LOW 20 MIN: CPT | Performed by: INTERNAL MEDICINE

## 2019-08-01 PROCEDURE — 82784 ASSAY IGA/IGD/IGG/IGM EACH: CPT

## 2019-08-01 PROCEDURE — 83883 ASSAY NEPHELOMETRY NOT SPEC: CPT

## 2019-08-01 PROCEDURE — 84165 PROTEIN E-PHORESIS SERUM: CPT

## 2019-08-01 PROCEDURE — 36415 COLL VENOUS BLD VENIPUNCTURE: CPT

## 2019-08-01 PROCEDURE — 86334 IMMUNOFIX E-PHORESIS SERUM: CPT

## 2019-08-01 PROCEDURE — 85025 COMPLETE CBC W/AUTO DIFF WBC: CPT

## 2019-08-01 PROCEDURE — 80053 COMPREHEN METABOLIC PANEL: CPT

## 2019-08-01 NOTE — PROGRESS NOTES
PROBLEM LIST:  1. Waldenstrom's macroglobulinemia:  a) Splenomegaly was noted on a CT of the chest on 06/06/2015 done for weight  loss, decreased appetite and left upper quadrant pain.   b) Lab evaluation on 06/16/2015 showed an anemia with hemoglobin 10.2,  thrombocytopenia with platelets 116, a 0.6 g/dL IgM lambda monoclonal protein  was identified on SPEP, beta-2 microglobulin was 10.4, . 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   c) Treatment with Ibrutinib was started 07/2015.   2. Hypertension.   3. Essential tremor.   4. Scoliosis.     Subjective       Chief complaint: Management of Waldenstrom's macroglobulinemia    HISTORY OF PRESENT ILLNESS:   Ms. Yusuf returns for follow-up.  She increased her ibrutinib dose to 280 mg after her last visit.  She says she does notice some worsening symptoms since increasing the dose.  She feels a little more tired.  For the first few weeks after the dose increase she had some nausea although this has improved with time.  She has diarrhea occasionally which is unpredictable.  She has not tried taking anything for this but it makes her reluctant to go out of the house very much.  Her blood pressure has been a little bit higher recently.  She bruises more easily.    Past Medical History, Past Surgical History, Social History, Family History have been reviewed and are without significant changes except as mentioned.    Review of Systems   A comprehensive 14 point review of systems was performed and was negative except as mentioned.    Medications:  The current medication list was reviewed in the EMR    ALLERGIES:    Allergies   Allergen Reactions   • Bactrim [Sulfamethoxazole-Trimethoprim] Other (See Comments)     unknown   • Sulfa Antibiotics Other (See Comments)     unknown   • Vancomycin Other (See Comments)     unknown   • Zosyn  "[Piperacillin Sod-Tazobactam So] Other (See Comments)     unknown       Objective      /68 Comment: LUE  Pulse 54   Temp 97.4 °F (36.3 °C) (Temporal)   Resp 16   Ht 157.5 cm (62\")   Wt 61.2 kg (135 lb)   SpO2 96% Comment: RA  BMI 24.69 kg/m²      Performance Status: 1    General: well appearing female in no acute distress  Neuro: alert and oriented  HEENT: sclera anicteric, oropharynx clear  Lymphatics: no cervical, supraclavicular, or axillary adenopathy  Cardiovascular: regular rate and rhythm, no murmurs  Lungs: clear to auscultation bilaterally  Abdomen: soft, nontender  Extremeties: 1+ lower extremity edema bilaterally  Skin: Moderate bruising on forearms  Psych: mood and affect appropriate    Labs:     Results for LUPE HILL (MRN [id number]) as of 8/1/2019 09:32   Ref. Range 8/1/2019 08:52   WBC Latest Ref Range: 3.40 - 10.80 10*3/mm3 5.90   RBC Latest Ref Range: 3.77 - 5.28 10*6/mm3 4.80   Hemoglobin Latest Ref Range: 12.0 - 15.9 g/dL 12.5   Hematocrit Latest Ref Range: 34.0 - 46.6 % 39.1   RDW Latest Ref Range: 12.3 - 15.4 % 15.3   MCV Latest Ref Range: 79.0 - 97.0 fL 81.5   MCH Latest Ref Range: 26.6 - 33.0 pg 26.0 (L)   MCHC Latest Ref Range: 31.5 - 35.7 g/dL 31.9   MPV Latest Ref Range: 6.0 - 12.0 fL 8.6   Platelets Latest Ref Range: 140 - 450 10*3/mm3 160         Assessment/Plan   Lupe Hill is a 78 y.o. female with Waldenström's macroglobulinemia who returns for follow up. Repeat SPEP is pending today to see if she has stabilization of her IgM protein with an increase in ibrutinib dose.  She has had some side effects related to the dose increase including diarrhea, nausea, and bruising.  If her IgM protein continues to increase, we may consider stopping ibrutinib and switching to single agent rituximab.    F/u in 3 months.  We will contact her with her lab results.                 I spent 15 minutes with the patient. I spent > 50% percent of this time counseling and discussing " prognosis, diagnostic testing, evaluation, current status and management.      Kasey Alvarez MD  [facility]    8/1/2019          CC:

## 2019-08-02 LAB
ALBUMIN SERPL-MCNC: 3.5 G/DL (ref 2.9–4.4)
ALBUMIN/GLOB SERPL: 1.3 {RATIO} (ref 0.7–1.7)
ALPHA1 GLOB FLD ELPH-MCNC: 0.3 G/DL (ref 0–0.4)
ALPHA2 GLOB SERPL ELPH-MCNC: 0.7 G/DL (ref 0.4–1)
B-GLOBULIN SERPL ELPH-MCNC: 0.8 G/DL (ref 0.7–1.3)
GAMMA GLOB SERPL ELPH-MCNC: 0.9 G/DL (ref 0.4–1.8)
GLOBULIN SER CALC-MCNC: 2.8 G/DL (ref 2.2–3.9)
IGA SERPL-MCNC: 17 MG/DL (ref 64–422)
IGG SERPL-MCNC: 335 MG/DL (ref 700–1600)
IGM SERPL-MCNC: 1236 MG/DL (ref 26–217)
INTERPRETATION SERPL IEP-IMP: ABNORMAL
KAPPA LC SERPL-MCNC: 13.5 MG/L (ref 3.3–19.4)
KAPPA LC/LAMBDA SER: 0.43 {RATIO} (ref 0.26–1.65)
LAMBDA LC FREE SERPL-MCNC: 31.3 MG/L (ref 5.7–26.3)
Lab: ABNORMAL
M-SPIKE: 0.5 G/DL
PROT SERPL-MCNC: 6.3 G/DL (ref 6–8.5)

## 2019-08-06 ENCOUNTER — TELEPHONE (OUTPATIENT)
Dept: ONCOLOGY | Facility: CLINIC | Age: 79
End: 2019-08-06

## 2019-08-06 NOTE — TELEPHONE ENCOUNTER
Called patient regarding her lab results.  Her IgM level has increased slightly compared to her last visit.  M spike is unchanged.  She has had increased fatigue and weakness since increasing the ibrutinib dose.  We discussed the option of switching treatments to single agent rituximab.  I think this would be reasonably well-tolerated.  I would likely treat with weekly rituximab x4, and repeat this again about 3 months later.  At this point she would prefer to continue with her current treatment as she prefers oral versus IV medication.  We will recheck labs again in about 3 months.  She also will plan to discuss this with her son.

## 2019-09-04 RX ORDER — ATENOLOL 100 MG/1
TABLET ORAL
Qty: 90 TABLET | Refills: 0 | Status: SHIPPED | OUTPATIENT
Start: 2019-09-04 | End: 2019-01-01

## 2019-09-27 ENCOUNTER — OFFICE VISIT (OUTPATIENT)
Dept: FAMILY MEDICINE CLINIC | Facility: CLINIC | Age: 79
End: 2019-09-27

## 2019-09-27 VITALS
HEIGHT: 62 IN | DIASTOLIC BLOOD PRESSURE: 68 MMHG | BODY MASS INDEX: 24.62 KG/M2 | WEIGHT: 133.8 LBS | HEART RATE: 57 BPM | SYSTOLIC BLOOD PRESSURE: 112 MMHG | OXYGEN SATURATION: 95 % | RESPIRATION RATE: 16 BRPM

## 2019-09-27 DIAGNOSIS — I10 ESSENTIAL HYPERTENSION: Primary | ICD-10-CM

## 2019-09-27 PROCEDURE — G0008 ADMIN INFLUENZA VIRUS VAC: HCPCS | Performed by: FAMILY MEDICINE

## 2019-09-27 PROCEDURE — 90653 IIV ADJUVANT VACCINE IM: CPT | Performed by: FAMILY MEDICINE

## 2019-09-27 PROCEDURE — 99213 OFFICE O/P EST LOW 20 MIN: CPT | Performed by: FAMILY MEDICINE

## 2019-09-29 RX ORDER — FLUOROURACIL 50 MG/G
CREAM TOPICAL
COMMUNITY
End: 2019-10-20

## 2019-09-30 NOTE — PROGRESS NOTES
"Subjective   Carin Yusuf is a 79 y.o. female.     Hypertension   This is a chronic (Stable bp at home) problem. The current episode started more than 1 year ago. The problem has been gradually improving since onset. The problem is uncontrolled. Associated symptoms include peripheral edema. Pertinent negatives include no chest pain, headaches, palpitations or shortness of breath. Risk factors for coronary artery disease include post-menopausal state and sedentary lifestyle. Current antihypertension treatment includes calcium channel blockers, beta blockers, diuretics and angiotensin blockers. The current treatment provides mild improvement. There are no compliance problems.  There is no history of angina, kidney disease or CAD/MI.   Arthritis   Presents for follow-up visit. The symptoms have been stable. Affected locations include the left shoulder and right shoulder. Pertinent negatives include no diarrhea, dry mouth, dysuria, fatigue, rash or Raynaud's syndrome.        The following portions of the patient's history were reviewed and updated as appropriate: allergies, current medications, past social history and problem list.    Review of Systems   Constitutional: Negative for activity change and fatigue.   Eyes: Negative for pain and visual disturbance.   Respiratory: Negative for chest tightness and shortness of breath.    Cardiovascular: Negative for chest pain, palpitations and leg swelling.   Gastrointestinal: Negative for diarrhea, nausea and vomiting.   Genitourinary: Negative for dysuria and hematuria.   Musculoskeletal: Positive for arthralgias and arthritis.   Skin: Negative for rash.   Neurological: Positive for tremors. Negative for dizziness, syncope and headaches.       Objective   /68   Pulse 57   Resp 16   Ht 157.5 cm (62\")   Wt 60.7 kg (133 lb 12.8 oz)   SpO2 95%   BMI 24.47 kg/m²   Physical Exam   Constitutional: She is oriented to person, place, and time. She appears " well-developed and well-nourished. She is cooperative.   HENT:   Head: Normocephalic.   Right Ear: External ear normal.   Left Ear: External ear normal.   Nose: Nose normal.   Mouth/Throat: Oropharynx is clear and moist.   Eyes: Conjunctivae are normal. Pupils are equal, round, and reactive to light. No scleral icterus.   Neck: Neck supple. Carotid bruit is not present. No thyromegaly present.   Cardiovascular: Normal rate, regular rhythm and normal heart sounds.   Pulmonary/Chest: Effort normal and breath sounds normal.   Abdominal: There is no hepatosplenomegaly.   Musculoskeletal: Normal range of motion. She exhibits no edema or tenderness.   Neurological: She is alert and oriented to person, place, and time.   No focal deficits no lateralizing signs   Skin: Skin is warm and dry. No rash noted.   Psychiatric: She has a normal mood and affect. Cognition and memory are normal.   Nursing note and vitals reviewed.      Assessment/Plan   Problem List Items Addressed This Visit        Active Problems    Essential hypertension - Primary          No orders of the defined types were placed in this encounter.    Continue current medications and regular follow-up with oncology.

## 2019-10-07 ENCOUNTER — TELEPHONE (OUTPATIENT)
Dept: FAMILY MEDICINE CLINIC | Facility: CLINIC | Age: 79
End: 2019-10-07

## 2019-10-07 NOTE — TELEPHONE ENCOUNTER
Informed pt that she would need an appt for the injection.  She said that she would call back later to make an appt.  [facility]    ----- Message from Hilario Vasques MD sent at 10/7/2019  3:16 PM EDT -----  Regarding: RE: LABS  Contact: [phone]  May need to ask her oncologist it is ok with me  ----- Message -----  From: Ranjeet Tejada MA  Sent: 10/7/2019  11:26 AM  To: Hilario Vasques MD  Subject: FW: LABS                                         She was wanting a cortisone shot and she wanted to know if she could  get her labs drawn.      ----- Message -----  From: Jessica Trevizo RegSched Rep  Sent: 10/7/2019   8:45 AM  To: Ranjeet Tejada MA  Subject: LABS                                             PT WOULD LIKE TO BE CALLED ABOUT GETTING LABS DONE. COULD YOU PLS CALL AND ADVISE @ [phone]

## 2019-10-09 ENCOUNTER — OFFICE VISIT (OUTPATIENT)
Dept: FAMILY MEDICINE CLINIC | Facility: CLINIC | Age: 79
End: 2019-10-09

## 2019-10-09 VITALS
HEART RATE: 62 BPM | HEIGHT: 62 IN | SYSTOLIC BLOOD PRESSURE: 144 MMHG | RESPIRATION RATE: 16 BRPM | BODY MASS INDEX: 25.32 KG/M2 | WEIGHT: 137.6 LBS | DIASTOLIC BLOOD PRESSURE: 80 MMHG | OXYGEN SATURATION: 90 %

## 2019-10-09 DIAGNOSIS — M19.90 ARTHRITIS: ICD-10-CM

## 2019-10-09 DIAGNOSIS — E03.9 ACQUIRED HYPOTHYROIDISM: ICD-10-CM

## 2019-10-09 DIAGNOSIS — R79.89 ABNORMAL TSH: ICD-10-CM

## 2019-10-09 DIAGNOSIS — R05.9 COUGH: Primary | ICD-10-CM

## 2019-10-09 PROCEDURE — 96372 THER/PROPH/DIAG INJ SC/IM: CPT | Performed by: FAMILY MEDICINE

## 2019-10-09 PROCEDURE — 85027 COMPLETE CBC AUTOMATED: CPT | Performed by: FAMILY MEDICINE

## 2019-10-09 PROCEDURE — 84439 ASSAY OF FREE THYROXINE: CPT | Performed by: FAMILY MEDICINE

## 2019-10-09 PROCEDURE — 80048 BASIC METABOLIC PNL TOTAL CA: CPT | Performed by: FAMILY MEDICINE

## 2019-10-09 PROCEDURE — 99214 OFFICE O/P EST MOD 30 MIN: CPT | Performed by: FAMILY MEDICINE

## 2019-10-09 PROCEDURE — 84443 ASSAY THYROID STIM HORMONE: CPT | Performed by: FAMILY MEDICINE

## 2019-10-09 RX ORDER — METHYLPREDNISOLONE ACETATE 40 MG/ML
40 INJECTION, SUSPENSION INTRA-ARTICULAR; INTRALESIONAL; INTRAMUSCULAR; SOFT TISSUE ONCE
Status: COMPLETED | OUTPATIENT
Start: 2019-10-09 | End: 2019-10-09

## 2019-10-09 RX ADMIN — METHYLPREDNISOLONE ACETATE 40 MG: 40 INJECTION, SUSPENSION INTRA-ARTICULAR; INTRALESIONAL; INTRAMUSCULAR; SOFT TISSUE at 09:01

## 2019-10-09 NOTE — PROGRESS NOTES
Subjective   Carin Yusuf is a 79 y.o. female.     Hypertension   This is a chronic (Stable bp at home) problem. The current episode started more than 1 year ago. The problem has been gradually improving since onset. The problem is uncontrolled. Associated symptoms include peripheral edema. Pertinent negatives include no chest pain, headaches, palpitations or shortness of breath. Risk factors for coronary artery disease include post-menopausal state and sedentary lifestyle. Current antihypertension treatment includes calcium channel blockers, beta blockers, diuretics and angiotensin blockers. The current treatment provides mild improvement. There are no compliance problems.  There is no history of angina, kidney disease or CAD/MI.   Arthritis   Presents for follow-up visit. She complains of pain and stiffness. The symptoms have been worsening. Affected locations include the left shoulder and right shoulder. Pertinent negatives include no diarrhea, dry mouth, dysuria, fatigue, rash or Raynaud's syndrome.   Cough   This is a recurrent (Dry cough in her throat sometimes associated with eating) problem. The problem has been unchanged. The cough is non-productive. Associated symptoms include myalgias and postnasal drip. Pertinent negatives include no chest pain, headaches, rash or shortness of breath. She has tried OTC cough suppressant for the symptoms. The treatment provided mild relief.        The following portions of the patient's history were reviewed and updated as appropriate: allergies, current medications, past social history and problem list.    Review of Systems   Constitutional: Negative for activity change and fatigue.   HENT: Positive for postnasal drip and trouble swallowing.    Eyes: Negative for pain and visual disturbance.   Respiratory: Positive for cough. Negative for chest tightness and shortness of breath.    Cardiovascular: Negative for chest pain, palpitations and leg swelling.  "  Gastrointestinal: Negative for diarrhea, nausea and vomiting.   Genitourinary: Negative for dysuria and hematuria.   Musculoskeletal: Positive for arthralgias, arthritis, myalgias, neck stiffness and stiffness.   Skin: Negative for rash.   Neurological: Negative for dizziness, syncope and headaches.       Objective   /80   Pulse 62   Resp 16   Ht 157.5 cm (62\")   Wt 62.4 kg (137 lb 9.6 oz)   SpO2 90%   BMI 25.17 kg/m²   Physical Exam   Constitutional: She is oriented to person, place, and time. She appears well-developed and well-nourished. She is cooperative.   HENT:   Head: Normocephalic.   Right Ear: External ear normal.   Left Ear: External ear normal.   Nose: Nose normal.   Mouth/Throat: Oropharynx is clear and moist.   Clear postnasal drip   Eyes: Conjunctivae are normal. Pupils are equal, round, and reactive to light. No scleral icterus.   Neck: Neck supple. No JVD present. Carotid bruit is not present. No thyromegaly present.   Cardiovascular: Normal rate, regular rhythm and normal heart sounds.   Pulmonary/Chest: Effort normal and breath sounds normal. She has no wheezes. She has no rales.   Abdominal: There is no hepatosplenomegaly.   Musculoskeletal: Normal range of motion. She exhibits tenderness. She exhibits no edema.   Neurological: She is alert and oriented to person, place, and time.   No focal deficits no lateralizing signs   Skin: Skin is warm and dry. No rash noted.   Psychiatric: She has a normal mood and affect. Cognition and memory are normal.   Nursing note and vitals reviewed.      Assessment/Plan   Problem List Items Addressed This Visit     None      Visit Diagnoses     Cough    -  Primary    Relevant Medications    methylPREDNISolone acetate (DEPO-medrol) injection 40 mg (Completed)    Other Relevant Orders    CBC (No Diff)    Basic Metabolic Panel    Abnormal TSH        Relevant Orders    TSH    T4, Free    Acquired hypothyroidism        Relevant Orders    TSH    T4, Free "    Arthritis        Relevant Medications    methylPREDNISolone acetate (DEPO-medrol) injection 40 mg (Completed)          New Medications Ordered This Visit   Medications   • methylPREDNISolone acetate (DEPO-medrol) injection 40 mg     May consider swallowing study in the future.  Recheck thyroid level is been beneficial for arthritic symptoms and cough in the past.  Considering a medication change from her oncologist.

## 2019-10-10 LAB
ANION GAP SERPL CALCULATED.3IONS-SCNC: 14.6 MMOL/L (ref 5–15)
BUN BLD-MCNC: 24 MG/DL (ref 8–23)
BUN/CREAT SERPL: 11.5 (ref 7–25)
CALCIUM SPEC-SCNC: 9.3 MG/DL (ref 8.6–10.5)
CHLORIDE SERPL-SCNC: 98 MMOL/L (ref 98–107)
CO2 SERPL-SCNC: 30.4 MMOL/L (ref 22–29)
CREAT BLD-MCNC: 2.08 MG/DL (ref 0.57–1)
DEPRECATED RDW RBC AUTO: 41.4 FL (ref 37–54)
ERYTHROCYTE [DISTWIDTH] IN BLOOD BY AUTOMATED COUNT: 14.1 % (ref 12.3–15.4)
GFR SERPL CREATININE-BSD FRML MDRD: 23 ML/MIN/1.73
GLUCOSE BLD-MCNC: 111 MG/DL (ref 65–99)
HCT VFR BLD AUTO: 38.3 % (ref 34–46.6)
HGB BLD-MCNC: 11.9 G/DL (ref 12–15.9)
MCH RBC QN AUTO: 25.5 PG (ref 26.6–33)
MCHC RBC AUTO-ENTMCNC: 31.1 G/DL (ref 31.5–35.7)
MCV RBC AUTO: 82.2 FL (ref 79–97)
PLATELET # BLD AUTO: 112 10*3/MM3 (ref 140–450)
PMV BLD AUTO: 11.2 FL (ref 6–12)
POTASSIUM BLD-SCNC: 4.8 MMOL/L (ref 3.5–5.2)
RBC # BLD AUTO: 4.66 10*6/MM3 (ref 3.77–5.28)
SODIUM BLD-SCNC: 143 MMOL/L (ref 136–145)
T4 FREE SERPL-MCNC: 1.89 NG/DL (ref 0.93–1.7)
TSH SERPL DL<=0.05 MIU/L-ACNC: 7.26 UIU/ML (ref 0.27–4.2)
WBC NRBC COR # BLD: 5.24 10*3/MM3 (ref 3.4–10.8)

## 2019-10-16 ENCOUNTER — TELEPHONE (OUTPATIENT)
Dept: FAMILY MEDICINE CLINIC | Facility: CLINIC | Age: 79
End: 2019-10-16

## 2019-10-16 RX ORDER — DEXTROMETHORPHAN HYDROBROMIDE AND PROMETHAZINE HYDROCHLORIDE 15; 6.25 MG/5ML; MG/5ML
5 SYRUP ORAL 4 TIMES DAILY PRN
Qty: 120 ML | Refills: 0 | Status: SHIPPED | OUTPATIENT
Start: 2019-10-16 | End: 2019-11-07

## 2019-10-16 RX ORDER — DEXTROMETHORPHAN HYDROBROMIDE AND PROMETHAZINE HYDROCHLORIDE 15; 6.25 MG/5ML; MG/5ML
5 SYRUP ORAL 4 TIMES DAILY PRN
Qty: 120 ML | Refills: 0 | Status: SHIPPED | OUTPATIENT
Start: 2019-10-16 | End: 2019-10-16 | Stop reason: SDUPTHER

## 2019-10-16 NOTE — TELEPHONE ENCOUNTER
[facility]    ----- Message from Benton Ahumada sent at 10/16/2019  4:34 PM EDT -----  Regarding: COUGH MED   Contact: [phone]  PT SAID DR BERUMEN WAS SUPPOSED TO CALL IN A COUGH SYRUP BUT PHARMACY HAS NOT RECEIVED IT

## 2019-10-16 NOTE — TELEPHONE ENCOUNTER
Pt was informed.  EUGENE Tejada  [facility]    ----- Message from Hilario Vasques MD sent at 10/16/2019  8:56 AM EDT -----  Regarding: RE: MED REQUEST   Contact: [phone]  rx sent  ----- Message -----  From: Ranjeet Tejada MA  Sent: 10/16/2019   8:32 AM  To: Hilario Vasques MD  Subject: FW: MED REQUEST                                      ----- Message -----  From: Jessica Trevizo RegSched Rep  Sent: 10/16/2019   8:11 AM  To: Ranjeet Tejada MA  Subject: MED REQUEST                                      PT CALLED AND AND STATED SHE WAS IN LAST WEEK AND WOULD LIKE TO GET SOME COUGH MEDS BECAUSE SHE STILL CANT STOP COUGHING COULD YOU CALL AND ADVISE @ [phone]

## 2019-10-20 ENCOUNTER — APPOINTMENT (OUTPATIENT)
Dept: CARDIOLOGY | Facility: HOSPITAL | Age: 79
End: 2019-10-20

## 2019-10-20 ENCOUNTER — HOSPITAL ENCOUNTER (INPATIENT)
Facility: HOSPITAL | Age: 79
LOS: 7 days | Discharge: NURSING, REHAB OR LONG-TERM CARE | End: 2019-10-27
Attending: EMERGENCY MEDICINE | Admitting: INTERNAL MEDICINE

## 2019-10-20 ENCOUNTER — APPOINTMENT (OUTPATIENT)
Dept: GENERAL RADIOLOGY | Facility: HOSPITAL | Age: 79
End: 2019-10-20

## 2019-10-20 ENCOUNTER — APPOINTMENT (OUTPATIENT)
Dept: CT IMAGING | Facility: HOSPITAL | Age: 79
End: 2019-10-20

## 2019-10-20 DIAGNOSIS — Z78.9 IMPAIRED MOBILITY AND ADLS: ICD-10-CM

## 2019-10-20 DIAGNOSIS — Z74.09 IMPAIRED MOBILITY AND ADLS: ICD-10-CM

## 2019-10-20 DIAGNOSIS — J18.9 PNEUMONIA OF LEFT LOWER LOBE DUE TO INFECTIOUS ORGANISM: Primary | ICD-10-CM

## 2019-10-20 DIAGNOSIS — R09.02 HYPOXIA: ICD-10-CM

## 2019-10-20 LAB
ALBUMIN SERPL-MCNC: 3.9 G/DL (ref 3.5–5.2)
ALBUMIN/GLOB SERPL: 1.2 G/DL
ALP SERPL-CCNC: 82 U/L (ref 39–117)
ALT SERPL W P-5'-P-CCNC: <5 U/L (ref 1–33)
ANION GAP SERPL CALCULATED.3IONS-SCNC: 12 MMOL/L (ref 5–15)
AST SERPL-CCNC: 12 U/L (ref 1–32)
BACTERIA UR QL AUTO: ABNORMAL /HPF
BASOPHILS # BLD AUTO: 0.03 10*3/MM3 (ref 0–0.2)
BASOPHILS NFR BLD AUTO: 0.6 % (ref 0–1.5)
BH CV ECHO MEAS - BSA(HAYCOCK): 1.6 M^2
BH CV ECHO MEAS - BSA: 1.6 M^2
BH CV ECHO MEAS - BZI_BMI: 24.3 KILOGRAMS/M^2
BH CV ECHO MEAS - BZI_METRIC_HEIGHT: 157.5 CM
BH CV ECHO MEAS - BZI_METRIC_WEIGHT: 60.3 KG
BH CV LOWER VASCULAR LEFT COMMON FEMORAL AUGMENT: NORMAL
BH CV LOWER VASCULAR LEFT COMMON FEMORAL COMPETENT: NORMAL
BH CV LOWER VASCULAR LEFT COMMON FEMORAL COMPRESS: NORMAL
BH CV LOWER VASCULAR LEFT COMMON FEMORAL PHASIC: NORMAL
BH CV LOWER VASCULAR LEFT COMMON FEMORAL SPONT: NORMAL
BH CV LOWER VASCULAR LEFT DISTAL FEMORAL COMPRESS: NORMAL
BH CV LOWER VASCULAR LEFT GASTRONEMIUS COMPRESS: NORMAL
BH CV LOWER VASCULAR LEFT GREATER SAPH AK COMPRESS: NORMAL
BH CV LOWER VASCULAR LEFT GREATER SAPH BK COMPRESS: NORMAL
BH CV LOWER VASCULAR LEFT LESSER SAPH COMPRESS: NORMAL
BH CV LOWER VASCULAR LEFT MID FEMORAL AUGMENT: NORMAL
BH CV LOWER VASCULAR LEFT MID FEMORAL COMPETENT: NORMAL
BH CV LOWER VASCULAR LEFT MID FEMORAL COMPRESS: NORMAL
BH CV LOWER VASCULAR LEFT MID FEMORAL PHASIC: NORMAL
BH CV LOWER VASCULAR LEFT MID FEMORAL SPONT: NORMAL
BH CV LOWER VASCULAR LEFT PERONEAL COMPRESS: NORMAL
BH CV LOWER VASCULAR LEFT POPLITEAL AUGMENT: NORMAL
BH CV LOWER VASCULAR LEFT POPLITEAL COMPETENT: NORMAL
BH CV LOWER VASCULAR LEFT POPLITEAL COMPRESS: NORMAL
BH CV LOWER VASCULAR LEFT POPLITEAL PHASIC: NORMAL
BH CV LOWER VASCULAR LEFT POPLITEAL SPONT: NORMAL
BH CV LOWER VASCULAR LEFT POSTERIOR TIBIAL COMPRESS: NORMAL
BH CV LOWER VASCULAR LEFT PROFUNDA FEMORAL AUGMENT: NORMAL
BH CV LOWER VASCULAR LEFT PROFUNDA FEMORAL COMPETENT: NORMAL
BH CV LOWER VASCULAR LEFT PROFUNDA FEMORAL COMPRESS: NORMAL
BH CV LOWER VASCULAR LEFT PROFUNDA FEMORAL PHASIC: NORMAL
BH CV LOWER VASCULAR LEFT PROFUNDA FEMORAL SPONT: NORMAL
BH CV LOWER VASCULAR LEFT PROXIMAL FEMORAL COMPRESS: NORMAL
BH CV LOWER VASCULAR LEFT SAPHENOFEMORAL JUNCTION AUGMENT: NORMAL
BH CV LOWER VASCULAR LEFT SAPHENOFEMORAL JUNCTION COMPETENT: NORMAL
BH CV LOWER VASCULAR LEFT SAPHENOFEMORAL JUNCTION COMPRESS: NORMAL
BH CV LOWER VASCULAR LEFT SAPHENOFEMORAL JUNCTION PHASIC: NORMAL
BH CV LOWER VASCULAR LEFT SAPHENOFEMORAL JUNCTION SPONT: NORMAL
BH CV LOWER VASCULAR RIGHT COMMON FEMORAL AUGMENT: NORMAL
BH CV LOWER VASCULAR RIGHT COMMON FEMORAL COMPETENT: NORMAL
BH CV LOWER VASCULAR RIGHT COMMON FEMORAL COMPRESS: NORMAL
BH CV LOWER VASCULAR RIGHT COMMON FEMORAL PHASIC: NORMAL
BH CV LOWER VASCULAR RIGHT COMMON FEMORAL SPONT: NORMAL
BH CV LOWER VASCULAR RIGHT DISTAL FEMORAL COMPRESS: NORMAL
BH CV LOWER VASCULAR RIGHT GASTRONEMIUS COMPRESS: NORMAL
BH CV LOWER VASCULAR RIGHT GREATER SAPH AK COMPRESS: NORMAL
BH CV LOWER VASCULAR RIGHT GREATER SAPH BK COMPRESS: NORMAL
BH CV LOWER VASCULAR RIGHT LESSER SAPH COMPRESS: NORMAL
BH CV LOWER VASCULAR RIGHT MID FEMORAL AUGMENT: NORMAL
BH CV LOWER VASCULAR RIGHT MID FEMORAL COMPETENT: NORMAL
BH CV LOWER VASCULAR RIGHT MID FEMORAL COMPRESS: NORMAL
BH CV LOWER VASCULAR RIGHT MID FEMORAL PHASIC: NORMAL
BH CV LOWER VASCULAR RIGHT MID FEMORAL SPONT: NORMAL
BH CV LOWER VASCULAR RIGHT PERONEAL COMPRESS: NORMAL
BH CV LOWER VASCULAR RIGHT POPLITEAL AUGMENT: NORMAL
BH CV LOWER VASCULAR RIGHT POPLITEAL COMPETENT: NORMAL
BH CV LOWER VASCULAR RIGHT POPLITEAL COMPRESS: NORMAL
BH CV LOWER VASCULAR RIGHT POPLITEAL PHASIC: NORMAL
BH CV LOWER VASCULAR RIGHT POPLITEAL SPONT: NORMAL
BH CV LOWER VASCULAR RIGHT POSTERIOR TIBIAL COMPRESS: NORMAL
BH CV LOWER VASCULAR RIGHT PROFUNDA FEMORAL COMPRESS: NORMAL
BH CV LOWER VASCULAR RIGHT PROXIMAL FEMORAL COMPRESS: NORMAL
BH CV LOWER VASCULAR RIGHT SAPHENOFEMORAL JUNCTION AUGMENT: NORMAL
BH CV LOWER VASCULAR RIGHT SAPHENOFEMORAL JUNCTION COMPETENT: NORMAL
BH CV LOWER VASCULAR RIGHT SAPHENOFEMORAL JUNCTION COMPRESS: NORMAL
BH CV LOWER VASCULAR RIGHT SAPHENOFEMORAL JUNCTION PHASIC: NORMAL
BH CV LOWER VASCULAR RIGHT SAPHENOFEMORAL JUNCTION SPONT: NORMAL
BILIRUB SERPL-MCNC: 1.5 MG/DL (ref 0.2–1.2)
BILIRUB UR QL STRIP: NEGATIVE
BUN BLD-MCNC: 26 MG/DL (ref 8–23)
BUN BLDA-MCNC: 26 MG/DL (ref 8–26)
BUN/CREAT SERPL: 12.6 (ref 7–25)
CA-I BLDA-SCNC: 1.13 MMOL/L (ref 1.2–1.32)
CALCIUM SPEC-SCNC: 9.4 MG/DL (ref 8.6–10.5)
CHLORIDE BLDA-SCNC: 95 MMOL/L (ref 98–109)
CHLORIDE SERPL-SCNC: 97 MMOL/L (ref 98–107)
CLARITY UR: ABNORMAL
CO2 BLDA-SCNC: 29 MMOL/L (ref 24–29)
CO2 SERPL-SCNC: 30 MMOL/L (ref 22–29)
COLOR UR: YELLOW
CREAT BLD-MCNC: 2.07 MG/DL (ref 0.57–1)
CREAT BLDA-MCNC: 2.3 MG/DL (ref 0.6–1.3)
D-LACTATE SERPL-SCNC: 1 MMOL/L (ref 0.5–2)
DEPRECATED RDW RBC AUTO: 46.8 FL (ref 37–54)
EOSINOPHIL # BLD AUTO: 0.03 10*3/MM3 (ref 0–0.4)
EOSINOPHIL NFR BLD AUTO: 0.6 % (ref 0.3–6.2)
ERYTHROCYTE [DISTWIDTH] IN BLOOD BY AUTOMATED COUNT: 15.6 % (ref 12.3–15.4)
GFR SERPL CREATININE-BSD FRML MDRD: 23 ML/MIN/1.73
GLOBULIN UR ELPH-MCNC: 3.2 GM/DL
GLUCOSE BLD-MCNC: 152 MG/DL (ref 65–99)
GLUCOSE BLDC GLUCOMTR-MCNC: 137 MG/DL (ref 70–130)
GLUCOSE UR STRIP-MCNC: NEGATIVE MG/DL
HCT VFR BLD AUTO: 41.7 % (ref 34–46.6)
HCT VFR BLDA CALC: 39 % (ref 38–51)
HGB BLD-MCNC: 12.2 G/DL (ref 12–15.9)
HGB BLDA-MCNC: 13.3 G/DL (ref 12–17)
HGB UR QL STRIP.AUTO: ABNORMAL
HOLD SPECIMEN: NORMAL
HOLD SPECIMEN: NORMAL
HYALINE CASTS UR QL AUTO: ABNORMAL /LPF
IMM GRANULOCYTES # BLD AUTO: 0.11 10*3/MM3 (ref 0–0.05)
IMM GRANULOCYTES NFR BLD AUTO: 2.2 % (ref 0–0.5)
KETONES UR QL STRIP: ABNORMAL
LEUKOCYTE ESTERASE UR QL STRIP.AUTO: ABNORMAL
LYMPHOCYTES # BLD AUTO: 1.2 10*3/MM3 (ref 0.7–3.1)
LYMPHOCYTES NFR BLD AUTO: 23.8 % (ref 19.6–45.3)
MCH RBC QN AUTO: 24.9 PG (ref 26.6–33)
MCHC RBC AUTO-ENTMCNC: 29.3 G/DL (ref 31.5–35.7)
MCV RBC AUTO: 85.3 FL (ref 79–97)
MONOCYTES # BLD AUTO: 0.27 10*3/MM3 (ref 0.1–0.9)
MONOCYTES NFR BLD AUTO: 5.3 % (ref 5–12)
NEUTROPHILS # BLD AUTO: 3.41 10*3/MM3 (ref 1.7–7)
NEUTROPHILS NFR BLD AUTO: 67.5 % (ref 42.7–76)
NITRITE UR QL STRIP: POSITIVE
NRBC BLD AUTO-RTO: 0 /100 WBC (ref 0–0.2)
NT-PROBNP SERPL-MCNC: 3130 PG/ML (ref 5–1800)
PH UR STRIP.AUTO: 5.5 [PH] (ref 5–8)
PLATELET # BLD AUTO: 122 10*3/MM3 (ref 140–450)
PMV BLD AUTO: 11.5 FL (ref 6–12)
POTASSIUM BLD-SCNC: 3.9 MMOL/L (ref 3.5–5.2)
POTASSIUM BLDA-SCNC: 3.8 MMOL/L (ref 3.5–4.9)
PROT SERPL-MCNC: 7.1 G/DL (ref 6–8.5)
PROT UR QL STRIP: ABNORMAL
RBC # BLD AUTO: 4.89 10*6/MM3 (ref 3.77–5.28)
RBC # UR: ABNORMAL /HPF
REF LAB TEST METHOD: ABNORMAL
SODIUM BLD-SCNC: 139 MMOL/L (ref 136–145)
SODIUM BLDA-SCNC: 136 MMOL/L (ref 138–146)
SP GR UR STRIP: 1.02 (ref 1–1.03)
SQUAMOUS #/AREA URNS HPF: ABNORMAL /HPF
TROPONIN T SERPL-MCNC: 0.02 NG/ML (ref 0–0.03)
UROBILINOGEN UR QL STRIP: ABNORMAL
WBC NRBC COR # BLD: 5.05 10*3/MM3 (ref 3.4–10.8)
WBC UR QL AUTO: ABNORMAL /HPF
WHOLE BLOOD HOLD SPECIMEN: NORMAL
WHOLE BLOOD HOLD SPECIMEN: NORMAL

## 2019-10-20 PROCEDURE — 25010000002 CEFTRIAXONE PER 250 MG: Performed by: EMERGENCY MEDICINE

## 2019-10-20 PROCEDURE — 80047 BASIC METABLC PNL IONIZED CA: CPT

## 2019-10-20 PROCEDURE — 63710000001 ASPIRIN 325 MG TABLET: Performed by: HOSPITALIST

## 2019-10-20 PROCEDURE — 93306 TTE W/DOPPLER COMPLETE: CPT | Performed by: INTERNAL MEDICINE

## 2019-10-20 PROCEDURE — 94640 AIRWAY INHALATION TREATMENT: CPT

## 2019-10-20 PROCEDURE — 93970 EXTREMITY STUDY: CPT | Performed by: INTERNAL MEDICINE

## 2019-10-20 PROCEDURE — 83605 ASSAY OF LACTIC ACID: CPT | Performed by: EMERGENCY MEDICINE

## 2019-10-20 PROCEDURE — 85014 HEMATOCRIT: CPT

## 2019-10-20 PROCEDURE — 63710000001 CERTAVITE/ANTIOXIDANTS TABLET: Performed by: HOSPITALIST

## 2019-10-20 PROCEDURE — 87086 URINE CULTURE/COLONY COUNT: CPT | Performed by: HOSPITALIST

## 2019-10-20 PROCEDURE — 99223 1ST HOSP IP/OBS HIGH 75: CPT | Performed by: HOSPITALIST

## 2019-10-20 PROCEDURE — 87186 SC STD MICRODIL/AGAR DIL: CPT | Performed by: HOSPITALIST

## 2019-10-20 PROCEDURE — 92610 EVALUATE SWALLOWING FUNCTION: CPT

## 2019-10-20 PROCEDURE — 85025 COMPLETE CBC W/AUTO DIFF WBC: CPT | Performed by: EMERGENCY MEDICINE

## 2019-10-20 PROCEDURE — 71250 CT THORAX DX C-: CPT

## 2019-10-20 PROCEDURE — 25010000002 HEPARIN (PORCINE) PER 1000 UNITS: Performed by: HOSPITALIST

## 2019-10-20 PROCEDURE — 93005 ELECTROCARDIOGRAM TRACING: CPT | Performed by: EMERGENCY MEDICINE

## 2019-10-20 PROCEDURE — 87040 BLOOD CULTURE FOR BACTERIA: CPT | Performed by: EMERGENCY MEDICINE

## 2019-10-20 PROCEDURE — 87077 CULTURE AEROBIC IDENTIFY: CPT | Performed by: HOSPITALIST

## 2019-10-20 PROCEDURE — 93970 EXTREMITY STUDY: CPT

## 2019-10-20 PROCEDURE — 94799 UNLISTED PULMONARY SVC/PX: CPT

## 2019-10-20 PROCEDURE — 84484 ASSAY OF TROPONIN QUANT: CPT | Performed by: EMERGENCY MEDICINE

## 2019-10-20 PROCEDURE — 83880 ASSAY OF NATRIURETIC PEPTIDE: CPT | Performed by: EMERGENCY MEDICINE

## 2019-10-20 PROCEDURE — 80053 COMPREHEN METABOLIC PANEL: CPT | Performed by: EMERGENCY MEDICINE

## 2019-10-20 PROCEDURE — 93306 TTE W/DOPPLER COMPLETE: CPT

## 2019-10-20 PROCEDURE — 71045 X-RAY EXAM CHEST 1 VIEW: CPT

## 2019-10-20 PROCEDURE — A9270 NON-COVERED ITEM OR SERVICE: HCPCS | Performed by: HOSPITALIST

## 2019-10-20 PROCEDURE — 81001 URINALYSIS AUTO W/SCOPE: CPT | Performed by: HOSPITALIST

## 2019-10-20 PROCEDURE — 25010000002 AZITHROMYCIN PER 500 MG: Performed by: EMERGENCY MEDICINE

## 2019-10-20 PROCEDURE — 99285 EMERGENCY DEPT VISIT HI MDM: CPT

## 2019-10-20 RX ORDER — IPRATROPIUM BROMIDE AND ALBUTEROL SULFATE 2.5; .5 MG/3ML; MG/3ML
3 SOLUTION RESPIRATORY (INHALATION) EVERY 4 HOURS PRN
Status: DISCONTINUED | OUTPATIENT
Start: 2019-10-20 | End: 2019-10-24

## 2019-10-20 RX ORDER — IPRATROPIUM BROMIDE AND ALBUTEROL SULFATE 2.5; .5 MG/3ML; MG/3ML
3 SOLUTION RESPIRATORY (INHALATION)
Status: DISCONTINUED | OUTPATIENT
Start: 2019-10-20 | End: 2019-10-24

## 2019-10-20 RX ORDER — HEPARIN SODIUM 5000 [USP'U]/ML
5000 INJECTION, SOLUTION INTRAVENOUS; SUBCUTANEOUS EVERY 8 HOURS SCHEDULED
Status: DISCONTINUED | OUTPATIENT
Start: 2019-10-20 | End: 2019-10-27 | Stop reason: HOSPADM

## 2019-10-20 RX ORDER — ASPIRIN 325 MG
162 TABLET ORAL DAILY
Status: DISCONTINUED | OUTPATIENT
Start: 2019-10-20 | End: 2019-10-21

## 2019-10-20 RX ORDER — MULTIPLE VITAMINS W/ MINERALS TAB 9MG-400MCG
1 TAB ORAL DAILY
Status: DISCONTINUED | OUTPATIENT
Start: 2019-10-20 | End: 2019-10-27 | Stop reason: HOSPADM

## 2019-10-20 RX ORDER — SODIUM CHLORIDE 0.9 % (FLUSH) 0.9 %
10 SYRINGE (ML) INJECTION EVERY 12 HOURS SCHEDULED
Status: DISCONTINUED | OUTPATIENT
Start: 2019-10-20 | End: 2019-10-27 | Stop reason: HOSPADM

## 2019-10-20 RX ORDER — GUAIFENESIN 600 MG/1
600 TABLET, EXTENDED RELEASE ORAL EVERY 12 HOURS SCHEDULED
Status: DISCONTINUED | OUTPATIENT
Start: 2019-10-20 | End: 2019-10-20

## 2019-10-20 RX ORDER — ONDANSETRON 2 MG/ML
4 INJECTION INTRAMUSCULAR; INTRAVENOUS EVERY 6 HOURS PRN
Status: DISCONTINUED | OUTPATIENT
Start: 2019-10-20 | End: 2019-10-27 | Stop reason: HOSPADM

## 2019-10-20 RX ORDER — SODIUM CHLORIDE 0.9 % (FLUSH) 0.9 %
10 SYRINGE (ML) INJECTION AS NEEDED
Status: DISCONTINUED | OUTPATIENT
Start: 2019-10-20 | End: 2019-10-27 | Stop reason: HOSPADM

## 2019-10-20 RX ORDER — ATENOLOL 50 MG/1
100 TABLET ORAL EVERY MORNING
Status: DISCONTINUED | OUTPATIENT
Start: 2019-10-21 | End: 2019-10-27 | Stop reason: HOSPADM

## 2019-10-20 RX ORDER — SACCHAROMYCES BOULARDII 250 MG
250 CAPSULE ORAL 2 TIMES DAILY
Status: DISCONTINUED | OUTPATIENT
Start: 2019-10-20 | End: 2019-10-27 | Stop reason: HOSPADM

## 2019-10-20 RX ORDER — ONDANSETRON 4 MG/1
4 TABLET, FILM COATED ORAL EVERY 6 HOURS PRN
Status: DISCONTINUED | OUTPATIENT
Start: 2019-10-20 | End: 2019-10-27 | Stop reason: HOSPADM

## 2019-10-20 RX ORDER — DOXYCYCLINE 100 MG/1
100 CAPSULE ORAL EVERY 12 HOURS SCHEDULED
Status: COMPLETED | OUTPATIENT
Start: 2019-10-20 | End: 2019-10-27

## 2019-10-20 RX ORDER — IPRATROPIUM BROMIDE AND ALBUTEROL SULFATE 2.5; .5 MG/3ML; MG/3ML
3 SOLUTION RESPIRATORY (INHALATION) ONCE
Status: COMPLETED | OUTPATIENT
Start: 2019-10-20 | End: 2019-10-20

## 2019-10-20 RX ORDER — MONTELUKAST SODIUM 10 MG/1
10 TABLET ORAL NIGHTLY
Status: DISCONTINUED | OUTPATIENT
Start: 2019-10-20 | End: 2019-10-27 | Stop reason: HOSPADM

## 2019-10-20 RX ADMIN — IPRATROPIUM BROMIDE AND ALBUTEROL SULFATE 3 ML: 2.5; .5 SOLUTION RESPIRATORY (INHALATION) at 19:38

## 2019-10-20 RX ADMIN — GUAIFENESIN 400 MG: 100 SOLUTION ORAL at 20:51

## 2019-10-20 RX ADMIN — MULTIPLE VITAMINS W/ MINERALS TAB 1 TABLET: TAB ORAL at 16:07

## 2019-10-20 RX ADMIN — DOXYCYCLINE 100 MG: 100 CAPSULE ORAL at 20:33

## 2019-10-20 RX ADMIN — AZITHROMYCIN MONOHYDRATE 500 MG: 500 INJECTION, POWDER, LYOPHILIZED, FOR SOLUTION INTRAVENOUS at 12:25

## 2019-10-20 RX ADMIN — IPRATROPIUM BROMIDE AND ALBUTEROL SULFATE 3 ML: 2.5; .5 SOLUTION RESPIRATORY (INHALATION) at 16:40

## 2019-10-20 RX ADMIN — MONTELUKAST SODIUM 10 MG: 10 TABLET, COATED ORAL at 20:34

## 2019-10-20 RX ADMIN — ASPIRIN 325 MG ORAL TABLET 162 MG: 325 PILL ORAL at 16:07

## 2019-10-20 RX ADMIN — Medication 250 MG: at 20:34

## 2019-10-20 RX ADMIN — HEPARIN SODIUM 5000 UNITS: 5000 INJECTION, SOLUTION INTRAVENOUS; SUBCUTANEOUS at 20:36

## 2019-10-20 RX ADMIN — IPRATROPIUM BROMIDE AND ALBUTEROL SULFATE 3 ML: 2.5; .5 SOLUTION RESPIRATORY (INHALATION) at 12:11

## 2019-10-20 RX ADMIN — SODIUM CHLORIDE, PRESERVATIVE FREE 10 ML: 5 INJECTION INTRAVENOUS at 20:36

## 2019-10-20 RX ADMIN — HEPARIN SODIUM 5000 UNITS: 5000 INJECTION, SOLUTION INTRAVENOUS; SUBCUTANEOUS at 16:07

## 2019-10-20 RX ADMIN — CEFTRIAXONE 1 G: 1 INJECTION, POWDER, FOR SOLUTION INTRAMUSCULAR; INTRAVENOUS at 13:35

## 2019-10-21 ENCOUNTER — APPOINTMENT (OUTPATIENT)
Dept: GENERAL RADIOLOGY | Facility: HOSPITAL | Age: 79
End: 2019-10-21

## 2019-10-21 LAB
ANION GAP SERPL CALCULATED.3IONS-SCNC: 6 MMOL/L (ref 5–15)
BH CV ECHO MEAS - AO MAX PG (FULL): 2.5 MMHG
BH CV ECHO MEAS - AO MAX PG: 6.2 MMHG
BH CV ECHO MEAS - AO MEAN PG (FULL): 1.9 MMHG
BH CV ECHO MEAS - AO MEAN PG: 3.7 MMHG
BH CV ECHO MEAS - AO ROOT AREA (BSA CORRECTED): 1.8
BH CV ECHO MEAS - AO ROOT AREA: 6.9 CM^2
BH CV ECHO MEAS - AO ROOT DIAM: 3 CM
BH CV ECHO MEAS - AO V2 MAX: 124.3 CM/SEC
BH CV ECHO MEAS - AO V2 MEAN: 90.9 CM/SEC
BH CV ECHO MEAS - AO V2 VTI: 30.1 CM
BH CV ECHO MEAS - AVA(I,A): 2.2 CM^2
BH CV ECHO MEAS - AVA(I,D): 2.2 CM^2
BH CV ECHO MEAS - AVA(V,A): 2.4 CM^2
BH CV ECHO MEAS - AVA(V,D): 2.4 CM^2
BH CV ECHO MEAS - BSA(HAYCOCK): 1.6 M^2
BH CV ECHO MEAS - BSA: 1.6 M^2
BH CV ECHO MEAS - BZI_BMI: 24.3 KILOGRAMS/M^2
BH CV ECHO MEAS - BZI_METRIC_HEIGHT: 157.5 CM
BH CV ECHO MEAS - BZI_METRIC_WEIGHT: 60.3 KG
BH CV ECHO MEAS - EDV(CUBED): 53.5 ML
BH CV ECHO MEAS - EDV(TEICH): 60.7 ML
BH CV ECHO MEAS - EF(CUBED): 80.3 %
BH CV ECHO MEAS - EF(TEICH): 73.6 %
BH CV ECHO MEAS - ESV(CUBED): 10.5 ML
BH CV ECHO MEAS - ESV(TEICH): 16 ML
BH CV ECHO MEAS - FS: 41.9 %
BH CV ECHO MEAS - IVS/LVPW: 1.1
BH CV ECHO MEAS - IVSD: 0.99 CM
BH CV ECHO MEAS - LAD MAJOR: 4.9 CM
BH CV ECHO MEAS - LAT PEAK E' VEL: 7.1 CM/SEC
BH CV ECHO MEAS - LATERAL E/E' RATIO: 11.3
BH CV ECHO MEAS - LV MASS(C)D: 104.5 GRAMS
BH CV ECHO MEAS - LV MASS(C)DI: 65 GRAMS/M^2
BH CV ECHO MEAS - LV MAX PG: 3.7 MMHG
BH CV ECHO MEAS - LV MEAN PG: 1.8 MMHG
BH CV ECHO MEAS - LV V1 MAX: 95.8 CM/SEC
BH CV ECHO MEAS - LV V1 MEAN: 62 CM/SEC
BH CV ECHO MEAS - LV V1 VTI: 21.2 CM
BH CV ECHO MEAS - LVIDD: 3.8 CM
BH CV ECHO MEAS - LVIDS: 2.2 CM
BH CV ECHO MEAS - LVOT AREA (M): 3.1 CM^2
BH CV ECHO MEAS - LVOT AREA: 3.2 CM^2
BH CV ECHO MEAS - LVOT DIAM: 2 CM
BH CV ECHO MEAS - LVPWD: 0.87 CM
BH CV ECHO MEAS - MED PEAK E' VEL: 6 CM/SEC
BH CV ECHO MEAS - MEDIAL E/E' RATIO: 13.3
BH CV ECHO MEAS - MV A MAX VEL: 74.5 CM/SEC
BH CV ECHO MEAS - MV DEC TIME: 0.2 SEC
BH CV ECHO MEAS - MV E MAX VEL: 82.4 CM/SEC
BH CV ECHO MEAS - MV E/A: 1.1
BH CV ECHO MEAS - MV MAX PG: 5.7 MMHG
BH CV ECHO MEAS - MV MEAN PG: 1.8 MMHG
BH CV ECHO MEAS - MV V2 MAX: 119.3 CM/SEC
BH CV ECHO MEAS - MV V2 MEAN: 58.6 CM/SEC
BH CV ECHO MEAS - MV V2 VTI: 41.3 CM
BH CV ECHO MEAS - MVA(VTI): 1.6 CM^2
BH CV ECHO MEAS - PA ACC SLOPE: 913.6 CM/SEC^2
BH CV ECHO MEAS - PA ACC TIME: 0.1 SEC
BH CV ECHO MEAS - PA MAX PG: 2.3 MMHG
BH CV ECHO MEAS - PA PR(ACCEL): 33.1 MMHG
BH CV ECHO MEAS - PA V2 MAX: 76 CM/SEC
BH CV ECHO MEAS - RAP SYSTOLE: 8 MMHG
BH CV ECHO MEAS - RVSP: 70 MMHG
BH CV ECHO MEAS - SI(AO): 129.2 ML/M^2
BH CV ECHO MEAS - SI(CUBED): 26.8 ML/M^2
BH CV ECHO MEAS - SI(LVOT): 41.5 ML/M^2
BH CV ECHO MEAS - SI(TEICH): 27.8 ML/M^2
BH CV ECHO MEAS - SV(AO): 207.7 ML
BH CV ECHO MEAS - SV(CUBED): 43 ML
BH CV ECHO MEAS - SV(LVOT): 66.8 ML
BH CV ECHO MEAS - SV(TEICH): 44.7 ML
BH CV ECHO MEAS - TAPSE (>1.6): 2.6 CM2
BH CV ECHO MEAS - TR MAX PG: 62 MMHG
BH CV ECHO MEAS - TR MAX VEL: 392 CM/SEC
BH CV ECHO MEASUREMENTS AVERAGE E/E' RATIO: 12.58
BH CV VAS BP RIGHT ARM: NORMAL MMHG
BH CV XLRA - RV BASE: 3.4 CM
BH CV XLRA - RV LENGTH: 7.1 CM
BH CV XLRA - RV MID: 2.2 CM
BH CV XLRA - TDI S': 13.3 CM/SEC
BUN BLD-MCNC: 25 MG/DL (ref 8–23)
BUN/CREAT SERPL: 11.8 (ref 7–25)
CALCIUM SPEC-SCNC: 8.7 MG/DL (ref 8.6–10.5)
CHLORIDE SERPL-SCNC: 99 MMOL/L (ref 98–107)
CO2 SERPL-SCNC: 33 MMOL/L (ref 22–29)
CREAT BLD-MCNC: 2.11 MG/DL (ref 0.57–1)
DEPRECATED RDW RBC AUTO: 49.1 FL (ref 37–54)
ERYTHROCYTE [DISTWIDTH] IN BLOOD BY AUTOMATED COUNT: 15.8 % (ref 12.3–15.4)
GFR SERPL CREATININE-BSD FRML MDRD: 22 ML/MIN/1.73
GLUCOSE BLD-MCNC: 101 MG/DL (ref 65–99)
HCT VFR BLD AUTO: 37.9 % (ref 34–46.6)
HGB BLD-MCNC: 10.9 G/DL (ref 12–15.9)
MAXIMAL PREDICTED HEART RATE: 139 BPM
MCH RBC QN AUTO: 25.5 PG (ref 26.6–33)
MCHC RBC AUTO-ENTMCNC: 28.8 G/DL (ref 31.5–35.7)
MCV RBC AUTO: 88.8 FL (ref 79–97)
PLATELET # BLD AUTO: 108 10*3/MM3 (ref 140–450)
PMV BLD AUTO: 11.4 FL (ref 6–12)
POTASSIUM BLD-SCNC: 4.6 MMOL/L (ref 3.5–5.2)
RBC # BLD AUTO: 4.27 10*6/MM3 (ref 3.77–5.28)
SODIUM BLD-SCNC: 138 MMOL/L (ref 136–145)
STRESS TARGET HR: 118 BPM
WBC NRBC COR # BLD: 5.21 10*3/MM3 (ref 3.4–10.8)

## 2019-10-21 PROCEDURE — 85027 COMPLETE CBC AUTOMATED: CPT | Performed by: HOSPITALIST

## 2019-10-21 PROCEDURE — 25010000002 CEFTRIAXONE PER 250 MG: Performed by: HOSPITALIST

## 2019-10-21 PROCEDURE — 94799 UNLISTED PULMONARY SVC/PX: CPT

## 2019-10-21 PROCEDURE — 25010000002 HEPARIN (PORCINE) PER 1000 UNITS: Performed by: HOSPITALIST

## 2019-10-21 PROCEDURE — 74230 X-RAY XM SWLNG FUNCJ C+: CPT

## 2019-10-21 PROCEDURE — 99222 1ST HOSP IP/OBS MODERATE 55: CPT | Performed by: INTERNAL MEDICINE

## 2019-10-21 PROCEDURE — 80048 BASIC METABOLIC PNL TOTAL CA: CPT | Performed by: HOSPITALIST

## 2019-10-21 PROCEDURE — 97161 PT EVAL LOW COMPLEX 20 MIN: CPT

## 2019-10-21 PROCEDURE — 99233 SBSQ HOSP IP/OBS HIGH 50: CPT | Performed by: INTERNAL MEDICINE

## 2019-10-21 PROCEDURE — 92611 MOTION FLUOROSCOPY/SWALLOW: CPT

## 2019-10-21 PROCEDURE — 97116 GAIT TRAINING THERAPY: CPT

## 2019-10-21 RX ORDER — ASPIRIN 81 MG/1
81 TABLET ORAL DAILY
Status: DISCONTINUED | OUTPATIENT
Start: 2019-10-22 | End: 2019-10-27 | Stop reason: HOSPADM

## 2019-10-21 RX ORDER — ECHINACEA PURPUREA EXTRACT 125 MG
2 TABLET ORAL AS NEEDED
Status: DISCONTINUED | OUTPATIENT
Start: 2019-10-21 | End: 2019-10-27 | Stop reason: HOSPADM

## 2019-10-21 RX ORDER — BENZONATATE 100 MG/1
100 CAPSULE ORAL 3 TIMES DAILY PRN
Status: DISCONTINUED | OUTPATIENT
Start: 2019-10-21 | End: 2019-10-27 | Stop reason: HOSPADM

## 2019-10-21 RX ADMIN — BARIUM SULFATE 20 ML: 400 PASTE ORAL at 13:30

## 2019-10-21 RX ADMIN — DOXYCYCLINE 100 MG: 100 CAPSULE ORAL at 08:43

## 2019-10-21 RX ADMIN — IPRATROPIUM BROMIDE AND ALBUTEROL SULFATE 3 ML: 2.5; .5 SOLUTION RESPIRATORY (INHALATION) at 20:02

## 2019-10-21 RX ADMIN — BENZONATATE 100 MG: 100 CAPSULE ORAL at 03:34

## 2019-10-21 RX ADMIN — MULTIPLE VITAMINS W/ MINERALS TAB 1 TABLET: TAB ORAL at 08:43

## 2019-10-21 RX ADMIN — BENZONATATE 100 MG: 100 CAPSULE ORAL at 23:09

## 2019-10-21 RX ADMIN — IPRATROPIUM BROMIDE AND ALBUTEROL SULFATE 3 ML: 2.5; .5 SOLUTION RESPIRATORY (INHALATION) at 06:41

## 2019-10-21 RX ADMIN — Medication 250 MG: at 08:43

## 2019-10-21 RX ADMIN — HEPARIN SODIUM 5000 UNITS: 5000 INJECTION, SOLUTION INTRAVENOUS; SUBCUTANEOUS at 14:08

## 2019-10-21 RX ADMIN — IPRATROPIUM BROMIDE AND ALBUTEROL SULFATE 3 ML: 2.5; .5 SOLUTION RESPIRATORY (INHALATION) at 16:08

## 2019-10-21 RX ADMIN — GUAIFENESIN 400 MG: 100 SOLUTION ORAL at 21:34

## 2019-10-21 RX ADMIN — MONTELUKAST SODIUM 10 MG: 10 TABLET, COATED ORAL at 21:34

## 2019-10-21 RX ADMIN — HEPARIN SODIUM 5000 UNITS: 5000 INJECTION, SOLUTION INTRAVENOUS; SUBCUTANEOUS at 06:17

## 2019-10-21 RX ADMIN — IPRATROPIUM BROMIDE AND ALBUTEROL SULFATE 3 ML: 2.5; .5 SOLUTION RESPIRATORY (INHALATION) at 10:41

## 2019-10-21 RX ADMIN — SODIUM CHLORIDE, PRESERVATIVE FREE 10 ML: 5 INJECTION INTRAVENOUS at 21:35

## 2019-10-21 RX ADMIN — ASPIRIN 325 MG ORAL TABLET 162 MG: 325 PILL ORAL at 08:43

## 2019-10-21 RX ADMIN — CEFTRIAXONE 1 G: 1 INJECTION, POWDER, FOR SOLUTION INTRAMUSCULAR; INTRAVENOUS at 12:35

## 2019-10-21 RX ADMIN — BARIUM SULFATE 100 ML: 0.81 POWDER, FOR SUSPENSION ORAL at 13:30

## 2019-10-21 RX ADMIN — GUAIFENESIN 400 MG: 100 SOLUTION ORAL at 06:17

## 2019-10-21 RX ADMIN — HEPARIN SODIUM 5000 UNITS: 5000 INJECTION, SOLUTION INTRAVENOUS; SUBCUTANEOUS at 21:35

## 2019-10-21 RX ADMIN — SODIUM CHLORIDE, PRESERVATIVE FREE 10 ML: 5 INJECTION INTRAVENOUS at 08:44

## 2019-10-21 RX ADMIN — Medication 250 MG: at 21:34

## 2019-10-21 RX ADMIN — DOXYCYCLINE 100 MG: 100 CAPSULE ORAL at 21:34

## 2019-10-21 RX ADMIN — ATENOLOL 100 MG: 50 TABLET ORAL at 08:44

## 2019-10-22 LAB
ANION GAP SERPL CALCULATED.3IONS-SCNC: 7 MMOL/L (ref 5–15)
BACTERIA SPEC AEROBE CULT: ABNORMAL
BUN BLD-MCNC: 23 MG/DL (ref 8–23)
BUN/CREAT SERPL: 13.1 (ref 7–25)
CALCIUM SPEC-SCNC: 8.5 MG/DL (ref 8.6–10.5)
CHLORIDE SERPL-SCNC: 101 MMOL/L (ref 98–107)
CO2 SERPL-SCNC: 33 MMOL/L (ref 22–29)
CREAT BLD-MCNC: 1.75 MG/DL (ref 0.57–1)
DEPRECATED RDW RBC AUTO: 51.2 FL (ref 37–54)
ERYTHROCYTE [DISTWIDTH] IN BLOOD BY AUTOMATED COUNT: 16 % (ref 12.3–15.4)
GFR SERPL CREATININE-BSD FRML MDRD: 28 ML/MIN/1.73
GLUCOSE BLD-MCNC: 98 MG/DL (ref 65–99)
HCT VFR BLD AUTO: 37.3 % (ref 34–46.6)
HGB BLD-MCNC: 10.6 G/DL (ref 12–15.9)
MCH RBC QN AUTO: 25.5 PG (ref 26.6–33)
MCHC RBC AUTO-ENTMCNC: 28.4 G/DL (ref 31.5–35.7)
MCV RBC AUTO: 89.7 FL (ref 79–97)
PLATELET # BLD AUTO: 107 10*3/MM3 (ref 140–450)
PMV BLD AUTO: 12.1 FL (ref 6–12)
POTASSIUM BLD-SCNC: 4.1 MMOL/L (ref 3.5–5.2)
RBC # BLD AUTO: 4.16 10*6/MM3 (ref 3.77–5.28)
SODIUM BLD-SCNC: 141 MMOL/L (ref 136–145)
WBC NRBC COR # BLD: 4.44 10*3/MM3 (ref 3.4–10.8)

## 2019-10-22 PROCEDURE — 80048 BASIC METABOLIC PNL TOTAL CA: CPT | Performed by: INTERNAL MEDICINE

## 2019-10-22 PROCEDURE — 25010000002 CEFTRIAXONE PER 250 MG: Performed by: HOSPITALIST

## 2019-10-22 PROCEDURE — 25010000002 HEPARIN (PORCINE) PER 1000 UNITS: Performed by: HOSPITALIST

## 2019-10-22 PROCEDURE — 85027 COMPLETE CBC AUTOMATED: CPT | Performed by: INTERNAL MEDICINE

## 2019-10-22 PROCEDURE — 84155 ASSAY OF PROTEIN SERUM: CPT | Performed by: INTERNAL MEDICINE

## 2019-10-22 PROCEDURE — 84165 PROTEIN E-PHORESIS SERUM: CPT | Performed by: INTERNAL MEDICINE

## 2019-10-22 PROCEDURE — 99232 SBSQ HOSP IP/OBS MODERATE 35: CPT | Performed by: INTERNAL MEDICINE

## 2019-10-22 PROCEDURE — 94799 UNLISTED PULMONARY SVC/PX: CPT

## 2019-10-22 PROCEDURE — 82784 ASSAY IGA/IGD/IGG/IGM EACH: CPT | Performed by: INTERNAL MEDICINE

## 2019-10-22 PROCEDURE — 86334 IMMUNOFIX E-PHORESIS SERUM: CPT | Performed by: INTERNAL MEDICINE

## 2019-10-22 RX ADMIN — HEPARIN SODIUM 5000 UNITS: 5000 INJECTION, SOLUTION INTRAVENOUS; SUBCUTANEOUS at 13:14

## 2019-10-22 RX ADMIN — HEPARIN SODIUM 5000 UNITS: 5000 INJECTION, SOLUTION INTRAVENOUS; SUBCUTANEOUS at 21:48

## 2019-10-22 RX ADMIN — ATENOLOL 100 MG: 50 TABLET ORAL at 06:17

## 2019-10-22 RX ADMIN — Medication 250 MG: at 09:03

## 2019-10-22 RX ADMIN — IPRATROPIUM BROMIDE AND ALBUTEROL SULFATE 3 ML: 2.5; .5 SOLUTION RESPIRATORY (INHALATION) at 16:41

## 2019-10-22 RX ADMIN — HEPARIN SODIUM 5000 UNITS: 5000 INJECTION, SOLUTION INTRAVENOUS; SUBCUTANEOUS at 06:17

## 2019-10-22 RX ADMIN — DOXYCYCLINE 100 MG: 100 CAPSULE ORAL at 20:32

## 2019-10-22 RX ADMIN — Medication 250 MG: at 20:32

## 2019-10-22 RX ADMIN — SODIUM CHLORIDE, PRESERVATIVE FREE 10 ML: 5 INJECTION INTRAVENOUS at 20:33

## 2019-10-22 RX ADMIN — SODIUM CHLORIDE, PRESERVATIVE FREE 10 ML: 5 INJECTION INTRAVENOUS at 09:03

## 2019-10-22 RX ADMIN — DOXYCYCLINE 100 MG: 100 CAPSULE ORAL at 09:03

## 2019-10-22 RX ADMIN — MONTELUKAST SODIUM 10 MG: 10 TABLET, COATED ORAL at 20:32

## 2019-10-22 RX ADMIN — MULTIPLE VITAMINS W/ MINERALS TAB 1 TABLET: TAB ORAL at 09:03

## 2019-10-22 RX ADMIN — ASPIRIN 81 MG: 81 TABLET, COATED ORAL at 09:03

## 2019-10-22 RX ADMIN — IPRATROPIUM BROMIDE AND ALBUTEROL SULFATE 3 ML: 2.5; .5 SOLUTION RESPIRATORY (INHALATION) at 19:58

## 2019-10-22 RX ADMIN — GUAIFENESIN 400 MG: 100 SOLUTION ORAL at 06:18

## 2019-10-22 RX ADMIN — CEFTRIAXONE 1 G: 1 INJECTION, POWDER, FOR SOLUTION INTRAMUSCULAR; INTRAVENOUS at 13:14

## 2019-10-22 RX ADMIN — IPRATROPIUM BROMIDE AND ALBUTEROL SULFATE 3 ML: 2.5; .5 SOLUTION RESPIRATORY (INHALATION) at 12:53

## 2019-10-22 RX ADMIN — GUAIFENESIN 400 MG: 100 SOLUTION ORAL at 13:14

## 2019-10-22 RX ADMIN — IPRATROPIUM BROMIDE AND ALBUTEROL SULFATE 3 ML: 2.5; .5 SOLUTION RESPIRATORY (INHALATION) at 07:53

## 2019-10-23 LAB
ALBUMIN SERPL-MCNC: 3 G/DL (ref 2.9–4.4)
ALBUMIN SERPL-MCNC: 3.7 G/DL (ref 3.5–5.2)
ALBUMIN/GLOB SERPL: 1.2 G/DL
ALBUMIN/GLOB SERPL: 1.3 {RATIO} (ref 0.7–1.7)
ALP SERPL-CCNC: 70 U/L (ref 39–117)
ALPHA1 GLOB FLD ELPH-MCNC: 0.2 G/DL (ref 0–0.4)
ALPHA2 GLOB SERPL ELPH-MCNC: 0.4 G/DL (ref 0.4–1)
ALT SERPL W P-5'-P-CCNC: <5 U/L (ref 1–33)
ANION GAP SERPL CALCULATED.3IONS-SCNC: 9 MMOL/L (ref 5–15)
ARTERIAL PATENCY WRIST A: ABNORMAL
ARTERIAL PATENCY WRIST A: ABNORMAL
AST SERPL-CCNC: 11 U/L (ref 1–32)
ATMOSPHERIC PRESS: ABNORMAL MM[HG]
ATMOSPHERIC PRESS: ABNORMAL MM[HG]
B-GLOBULIN SERPL ELPH-MCNC: 0.8 G/DL (ref 0.7–1.3)
BASE EXCESS BLDA CALC-SCNC: 2.6 MMOL/L (ref 0–2)
BASE EXCESS BLDA CALC-SCNC: 2.8 MMOL/L (ref 0–2)
BASOPHILS # BLD AUTO: 0.03 10*3/MM3 (ref 0–0.2)
BASOPHILS NFR BLD AUTO: 0.6 % (ref 0–1.5)
BDY SITE: ABNORMAL
BDY SITE: ABNORMAL
BILIRUB SERPL-MCNC: 0.5 MG/DL (ref 0.2–1.2)
BODY TEMPERATURE: 37 C
BODY TEMPERATURE: 37 C
BUN BLD-MCNC: 22 MG/DL (ref 8–23)
BUN/CREAT SERPL: 14.4 (ref 7–25)
CALCIUM SPEC-SCNC: 9.1 MG/DL (ref 8.6–10.5)
CHLORIDE SERPL-SCNC: 96 MMOL/L (ref 98–107)
CO2 SERPL-SCNC: 32 MMOL/L (ref 22–29)
COHGB MFR BLD: 2 % (ref 0–2)
COHGB MFR BLD: 2.2 % (ref 0–2)
CREAT BLD-MCNC: 1.53 MG/DL (ref 0.57–1)
D-LACTATE SERPL-SCNC: 0.5 MMOL/L (ref 0.5–2)
DEPRECATED RDW RBC AUTO: 50.4 FL (ref 37–54)
EOSINOPHIL # BLD AUTO: 0.02 10*3/MM3 (ref 0–0.4)
EOSINOPHIL NFR BLD AUTO: 0.4 % (ref 0.3–6.2)
EPAP: 0
ERYTHROCYTE [DISTWIDTH] IN BLOOD BY AUTOMATED COUNT: 15.6 % (ref 12.3–15.4)
GAMMA GLOB SERPL ELPH-MCNC: 1 G/DL (ref 0.4–1.8)
GFR SERPL CREATININE-BSD FRML MDRD: 33 ML/MIN/1.73
GLOBULIN SER CALC-MCNC: 2.5 G/DL (ref 2.2–3.9)
GLOBULIN UR ELPH-MCNC: 3 GM/DL
GLUCOSE BLD-MCNC: 111 MG/DL (ref 65–99)
GLUCOSE BLDC GLUCOMTR-MCNC: 115 MG/DL (ref 70–130)
HCO3 BLDA-SCNC: 30.9 MMOL/L (ref 20–26)
HCO3 BLDA-SCNC: 31.2 MMOL/L (ref 20–26)
HCT VFR BLD AUTO: 39.1 % (ref 34–46.6)
HCT VFR BLD CALC: 33.2 %
HCT VFR BLD CALC: 34.2 %
HGB BLD-MCNC: 11.3 G/DL (ref 12–15.9)
HGB BLDA-MCNC: 10.8 G/DL (ref 14–18)
HGB BLDA-MCNC: 11.2 G/DL (ref 14–18)
HOROWITZ INDEX BLD+IHG-RTO: 28 %
HOROWITZ INDEX BLD+IHG-RTO: 36 %
IGA SERPL-MCNC: 14 MG/DL (ref 64–422)
IGG SERPL-MCNC: 276 MG/DL (ref 700–1600)
IGM SERPL-MCNC: 1359 MG/DL (ref 26–217)
IMM GRANULOCYTES # BLD AUTO: 0.07 10*3/MM3 (ref 0–0.05)
IMM GRANULOCYTES NFR BLD AUTO: 1.5 % (ref 0–0.5)
INTERPRETATION SERPL IEP-IMP: ABNORMAL
IPAP: 0
LYMPHOCYTES # BLD AUTO: 1.1 10*3/MM3 (ref 0.7–3.1)
LYMPHOCYTES NFR BLD AUTO: 23.8 % (ref 19.6–45.3)
Lab: ABNORMAL
Lab: ABNORMAL
M-SPIKE: 0.6 G/DL
MCH RBC QN AUTO: 25.7 PG (ref 26.6–33)
MCHC RBC AUTO-ENTMCNC: 28.9 G/DL (ref 31.5–35.7)
MCV RBC AUTO: 89.1 FL (ref 79–97)
METHGB BLD QL: 0.8 % (ref 0–1.5)
METHGB BLD QL: 0.8 % (ref 0–1.5)
MODALITY: ABNORMAL
MODALITY: ABNORMAL
MONOCYTES # BLD AUTO: 0.35 10*3/MM3 (ref 0.1–0.9)
MONOCYTES NFR BLD AUTO: 7.6 % (ref 5–12)
NEUTROPHILS # BLD AUTO: 3.06 10*3/MM3 (ref 1.7–7)
NEUTROPHILS NFR BLD AUTO: 66.1 % (ref 42.7–76)
NOTE: ABNORMAL
NOTE: ABNORMAL
NOTIFIED BY: ABNORMAL
NOTIFIED WHO: ABNORMAL
NRBC BLD AUTO-RTO: 0 /100 WBC (ref 0–0.2)
OXYHGB MFR BLDV: 91.4 % (ref 94–99)
OXYHGB MFR BLDV: 91.6 % (ref 94–99)
PAW @ PEAK INSP FLOW SETTING VENT: 0 CMH2O
PCO2 BLDA: 65.9 MM HG (ref 35–45)
PCO2 BLDA: 69.2 MM HG (ref 35–45)
PCO2 TEMP ADJ BLD: 65.9 MM HG (ref 35–45)
PCO2 TEMP ADJ BLD: 69.2 MM HG (ref 35–45)
PH BLDA: 7.26 PH UNITS (ref 7.35–7.45)
PH BLDA: 7.28 PH UNITS (ref 7.35–7.45)
PH, TEMP CORRECTED: 7.26 PH UNITS
PH, TEMP CORRECTED: 7.28 PH UNITS
PLATELET # BLD AUTO: 106 10*3/MM3 (ref 140–450)
PMV BLD AUTO: 11.2 FL (ref 6–12)
PO2 BLDA: 69.1 MM HG (ref 83–108)
PO2 BLDA: 69.8 MM HG (ref 83–108)
PO2 TEMP ADJ BLD: 69.1 MM HG (ref 83–108)
PO2 TEMP ADJ BLD: 69.8 MM HG (ref 83–108)
POTASSIUM BLD-SCNC: 4.7 MMOL/L (ref 3.5–5.2)
PROT SERPL-MCNC: 5.5 G/DL (ref 6–8.5)
PROT SERPL-MCNC: 6.7 G/DL (ref 6–8.5)
RBC # BLD AUTO: 4.39 10*6/MM3 (ref 3.77–5.28)
SODIUM BLD-SCNC: 137 MMOL/L (ref 136–145)
TOTAL RATE: 0 BREATHS/MINUTE
VENTILATOR MODE: ABNORMAL
WBC NRBC COR # BLD: 4.63 10*3/MM3 (ref 3.4–10.8)

## 2019-10-23 PROCEDURE — 97110 THERAPEUTIC EXERCISES: CPT

## 2019-10-23 PROCEDURE — 25010000002 HEPARIN (PORCINE) PER 1000 UNITS: Performed by: HOSPITALIST

## 2019-10-23 PROCEDURE — 97535 SELF CARE MNGMENT TRAINING: CPT

## 2019-10-23 PROCEDURE — 80053 COMPREHEN METABOLIC PANEL: CPT | Performed by: INTERNAL MEDICINE

## 2019-10-23 PROCEDURE — 97166 OT EVAL MOD COMPLEX 45 MIN: CPT

## 2019-10-23 PROCEDURE — 97116 GAIT TRAINING THERAPY: CPT

## 2019-10-23 PROCEDURE — 83605 ASSAY OF LACTIC ACID: CPT | Performed by: INTERNAL MEDICINE

## 2019-10-23 PROCEDURE — 94660 CPAP INITIATION&MGMT: CPT

## 2019-10-23 PROCEDURE — 82962 GLUCOSE BLOOD TEST: CPT

## 2019-10-23 PROCEDURE — 82805 BLOOD GASES W/O2 SATURATION: CPT

## 2019-10-23 PROCEDURE — 36600 WITHDRAWAL OF ARTERIAL BLOOD: CPT

## 2019-10-23 PROCEDURE — 94799 UNLISTED PULMONARY SVC/PX: CPT

## 2019-10-23 PROCEDURE — 99232 SBSQ HOSP IP/OBS MODERATE 35: CPT | Performed by: INTERNAL MEDICINE

## 2019-10-23 PROCEDURE — 85025 COMPLETE CBC W/AUTO DIFF WBC: CPT | Performed by: INTERNAL MEDICINE

## 2019-10-23 PROCEDURE — 25010000002 CEFTRIAXONE PER 250 MG: Performed by: HOSPITALIST

## 2019-10-23 RX ADMIN — BENZONATATE 100 MG: 100 CAPSULE ORAL at 05:22

## 2019-10-23 RX ADMIN — GUAIFENESIN 400 MG: 100 SOLUTION ORAL at 13:38

## 2019-10-23 RX ADMIN — CEFTRIAXONE 1 G: 1 INJECTION, POWDER, FOR SOLUTION INTRAMUSCULAR; INTRAVENOUS at 13:38

## 2019-10-23 RX ADMIN — GUAIFENESIN 400 MG: 100 SOLUTION ORAL at 05:23

## 2019-10-23 RX ADMIN — IPRATROPIUM BROMIDE AND ALBUTEROL SULFATE 3 ML: 2.5; .5 SOLUTION RESPIRATORY (INHALATION) at 06:10

## 2019-10-23 RX ADMIN — IPRATROPIUM BROMIDE AND ALBUTEROL SULFATE 3 ML: 2.5; .5 SOLUTION RESPIRATORY (INHALATION) at 18:55

## 2019-10-23 RX ADMIN — IPRATROPIUM BROMIDE AND ALBUTEROL SULFATE 3 ML: 2.5; .5 SOLUTION RESPIRATORY (INHALATION) at 12:05

## 2019-10-23 RX ADMIN — Medication 250 MG: at 22:36

## 2019-10-23 RX ADMIN — SODIUM CHLORIDE, PRESERVATIVE FREE 10 ML: 5 INJECTION INTRAVENOUS at 10:03

## 2019-10-23 RX ADMIN — HEPARIN SODIUM 5000 UNITS: 5000 INJECTION, SOLUTION INTRAVENOUS; SUBCUTANEOUS at 05:22

## 2019-10-23 RX ADMIN — MONTELUKAST SODIUM 10 MG: 10 TABLET, COATED ORAL at 22:36

## 2019-10-23 RX ADMIN — MULTIPLE VITAMINS W/ MINERALS TAB 1 TABLET: TAB ORAL at 10:14

## 2019-10-23 RX ADMIN — Medication 250 MG: at 10:03

## 2019-10-23 RX ADMIN — HEPARIN SODIUM 5000 UNITS: 5000 INJECTION, SOLUTION INTRAVENOUS; SUBCUTANEOUS at 22:37

## 2019-10-23 RX ADMIN — DOXYCYCLINE 100 MG: 100 CAPSULE ORAL at 22:36

## 2019-10-23 RX ADMIN — GUAIFENESIN 400 MG: 100 SOLUTION ORAL at 22:36

## 2019-10-23 RX ADMIN — HEPARIN SODIUM 5000 UNITS: 5000 INJECTION, SOLUTION INTRAVENOUS; SUBCUTANEOUS at 13:38

## 2019-10-23 RX ADMIN — ATENOLOL 100 MG: 50 TABLET ORAL at 07:20

## 2019-10-23 RX ADMIN — ASPIRIN 81 MG: 81 TABLET, COATED ORAL at 10:03

## 2019-10-23 RX ADMIN — DOXYCYCLINE 100 MG: 100 CAPSULE ORAL at 10:03

## 2019-10-23 RX ADMIN — SODIUM CHLORIDE, PRESERVATIVE FREE 10 ML: 5 INJECTION INTRAVENOUS at 22:37

## 2019-10-23 RX ADMIN — IPRATROPIUM BROMIDE AND ALBUTEROL SULFATE 3 ML: 2.5; .5 SOLUTION RESPIRATORY (INHALATION) at 07:43

## 2019-10-24 LAB
ARTERIAL PATENCY WRIST A: ABNORMAL
ATMOSPHERIC PRESS: ABNORMAL MM[HG]
BASE EXCESS BLDA CALC-SCNC: 2.9 MMOL/L (ref 0–2)
BASE EXCESS BLDA CALC-SCNC: 3.4 MMOL/L (ref 0–2)
BASE EXCESS BLDA CALC-SCNC: 3.6 MMOL/L (ref 0–2)
BDY SITE: ABNORMAL
BODY TEMPERATURE: 37 C
CO2 BLDA-SCNC: 32.4 MMOL/L (ref 22–33)
COHGB MFR BLD: 2.2 % (ref 0–2)
COHGB MFR BLD: 2.2 % (ref 0–2)
COHGB MFR BLD: 2.3 % (ref 0–2)
EPAP: 6
HCO3 BLDA-SCNC: 30.6 MMOL/L (ref 20–26)
HCO3 BLDA-SCNC: 31.2 MMOL/L (ref 20–26)
HCO3 BLDA-SCNC: 31.7 MMOL/L (ref 20–26)
HCT VFR BLD CALC: 31.7 %
HCT VFR BLD CALC: 31.8 %
HCT VFR BLD CALC: 33.5 %
HGB BLDA-MCNC: 10.4 G/DL (ref 14–18)
HGB BLDA-MCNC: 10.4 G/DL (ref 14–18)
HGB BLDA-MCNC: 10.9 G/DL (ref 14–18)
HOROWITZ INDEX BLD+IHG-RTO: 28 %
HOROWITZ INDEX BLD+IHG-RTO: 28 %
HOROWITZ INDEX BLD+IHG-RTO: 30 %
IPAP: 16
Lab: ABNORMAL
Lab: ABNORMAL
METHGB BLD QL: 0.8 % (ref 0–1.5)
METHGB BLD QL: 0.9 % (ref 0–1.5)
METHGB BLD QL: 1.1 % (ref 0–1.5)
MODALITY: ABNORMAL
NOTE: ABNORMAL
NOTIFIED BY: ABNORMAL
NOTIFIED BY: ABNORMAL
NOTIFIED WHO: ABNORMAL
NOTIFIED WHO: ABNORMAL
OXYHGB MFR BLDV: 92.4 % (ref 94–99)
OXYHGB MFR BLDV: 93.7 % (ref 94–99)
OXYHGB MFR BLDV: 94.6 % (ref 94–99)
PCO2 BLDA: 58.6 MM HG (ref 35–45)
PCO2 BLDA: 67.3 MM HG (ref 35–45)
PCO2 BLDA: 68.5 MM HG (ref 35–45)
PCO2 TEMP ADJ BLD: 58.6 MM HG (ref 35–45)
PCO2 TEMP ADJ BLD: 67.3 MM HG (ref 35–45)
PCO2 TEMP ADJ BLD: 68.5 MM HG (ref 35–45)
PH BLDA: 7.27 PH UNITS (ref 7.35–7.45)
PH BLDA: 7.28 PH UNITS (ref 7.35–7.45)
PH BLDA: 7.33 PH UNITS (ref 7.35–7.45)
PH, TEMP CORRECTED: 7.27 PH UNITS
PH, TEMP CORRECTED: 7.28 PH UNITS
PH, TEMP CORRECTED: 7.33 PH UNITS
PO2 BLDA: 74.6 MM HG (ref 83–108)
PO2 BLDA: 84.6 MM HG (ref 83–108)
PO2 BLDA: 92.8 MM HG (ref 83–108)
PO2 TEMP ADJ BLD: 74.6 MM HG (ref 83–108)
PO2 TEMP ADJ BLD: 84.6 MM HG (ref 83–108)
PO2 TEMP ADJ BLD: 92.8 MM HG (ref 83–108)
PSV: 10 CMH2O
SET MECH RESP RATE: 12
TOTAL RATE: 23 BREATHS/MINUTE
VENTILATOR MODE: ABNORMAL

## 2019-10-24 PROCEDURE — 99232 SBSQ HOSP IP/OBS MODERATE 35: CPT | Performed by: INTERNAL MEDICINE

## 2019-10-24 PROCEDURE — 25010000002 HEPARIN (PORCINE) PER 1000 UNITS: Performed by: HOSPITALIST

## 2019-10-24 PROCEDURE — 36600 WITHDRAWAL OF ARTERIAL BLOOD: CPT

## 2019-10-24 PROCEDURE — 99233 SBSQ HOSP IP/OBS HIGH 50: CPT | Performed by: NURSE PRACTITIONER

## 2019-10-24 PROCEDURE — 94799 UNLISTED PULMONARY SVC/PX: CPT

## 2019-10-24 PROCEDURE — 97110 THERAPEUTIC EXERCISES: CPT

## 2019-10-24 PROCEDURE — 82805 BLOOD GASES W/O2 SATURATION: CPT

## 2019-10-24 PROCEDURE — 94660 CPAP INITIATION&MGMT: CPT

## 2019-10-24 PROCEDURE — 25010000002 MAGNESIUM SULFATE IN D5W 1G/100ML (PREMIX) 1-5 GM/100ML-% SOLUTION: Performed by: INTERNAL MEDICINE

## 2019-10-24 PROCEDURE — 25010000002 HALOPERIDOL LACTATE PER 5 MG: Performed by: INTERNAL MEDICINE

## 2019-10-24 PROCEDURE — 25010000002 CEFTRIAXONE PER 250 MG: Performed by: HOSPITALIST

## 2019-10-24 PROCEDURE — 97116 GAIT TRAINING THERAPY: CPT

## 2019-10-24 RX ORDER — MAGNESIUM SULFATE 1 G/100ML
1 INJECTION INTRAVENOUS ONCE
Status: COMPLETED | OUTPATIENT
Start: 2019-10-24 | End: 2019-10-24

## 2019-10-24 RX ORDER — IPRATROPIUM BROMIDE AND ALBUTEROL SULFATE 2.5; .5 MG/3ML; MG/3ML
3 SOLUTION RESPIRATORY (INHALATION)
Status: DISCONTINUED | OUTPATIENT
Start: 2019-10-24 | End: 2019-10-27 | Stop reason: HOSPADM

## 2019-10-24 RX ORDER — HALOPERIDOL 5 MG/ML
2 INJECTION INTRAMUSCULAR ONCE
Status: COMPLETED | OUTPATIENT
Start: 2019-10-24 | End: 2019-10-24

## 2019-10-24 RX ORDER — ALBUTEROL SULFATE 2.5 MG/3ML
2.5 SOLUTION RESPIRATORY (INHALATION) EVERY 6 HOURS PRN
Status: DISCONTINUED | OUTPATIENT
Start: 2019-10-24 | End: 2019-10-27 | Stop reason: HOSPADM

## 2019-10-24 RX ADMIN — IPRATROPIUM BROMIDE AND ALBUTEROL SULFATE 3 ML: 2.5; .5 SOLUTION RESPIRATORY (INHALATION) at 08:36

## 2019-10-24 RX ADMIN — IPRATROPIUM BROMIDE AND ALBUTEROL SULFATE 3 ML: 2.5; .5 SOLUTION RESPIRATORY (INHALATION) at 23:36

## 2019-10-24 RX ADMIN — GUAIFENESIN 400 MG: 100 SOLUTION ORAL at 04:13

## 2019-10-24 RX ADMIN — Medication 250 MG: at 20:57

## 2019-10-24 RX ADMIN — SODIUM CHLORIDE, PRESERVATIVE FREE 10 ML: 5 INJECTION INTRAVENOUS at 20:58

## 2019-10-24 RX ADMIN — ASPIRIN 81 MG: 81 TABLET, COATED ORAL at 08:22

## 2019-10-24 RX ADMIN — HEPARIN SODIUM 5000 UNITS: 5000 INJECTION, SOLUTION INTRAVENOUS; SUBCUTANEOUS at 20:56

## 2019-10-24 RX ADMIN — MULTIPLE VITAMINS W/ MINERALS TAB 1 TABLET: TAB ORAL at 08:22

## 2019-10-24 RX ADMIN — CEFTRIAXONE 1 G: 1 INJECTION, POWDER, FOR SOLUTION INTRAMUSCULAR; INTRAVENOUS at 14:00

## 2019-10-24 RX ADMIN — MAGNESIUM SULFATE HEPTAHYDRATE 1 G: 1 INJECTION, SOLUTION INTRAVENOUS at 04:13

## 2019-10-24 RX ADMIN — GUAIFENESIN 400 MG: 100 SOLUTION ORAL at 20:56

## 2019-10-24 RX ADMIN — ATENOLOL 100 MG: 50 TABLET ORAL at 04:13

## 2019-10-24 RX ADMIN — GUAIFENESIN 400 MG: 100 SOLUTION ORAL at 14:00

## 2019-10-24 RX ADMIN — Medication 250 MG: at 08:22

## 2019-10-24 RX ADMIN — HEPARIN SODIUM 5000 UNITS: 5000 INJECTION, SOLUTION INTRAVENOUS; SUBCUTANEOUS at 04:13

## 2019-10-24 RX ADMIN — IPRATROPIUM BROMIDE AND ALBUTEROL SULFATE 3 ML: 2.5; .5 SOLUTION RESPIRATORY (INHALATION) at 19:39

## 2019-10-24 RX ADMIN — DOXYCYCLINE 100 MG: 100 CAPSULE ORAL at 20:57

## 2019-10-24 RX ADMIN — DOXYCYCLINE 100 MG: 100 CAPSULE ORAL at 08:22

## 2019-10-24 RX ADMIN — MONTELUKAST SODIUM 10 MG: 10 TABLET, COATED ORAL at 20:57

## 2019-10-24 RX ADMIN — HEPARIN SODIUM 5000 UNITS: 5000 INJECTION, SOLUTION INTRAVENOUS; SUBCUTANEOUS at 14:00

## 2019-10-24 RX ADMIN — ALBUTEROL SULFATE 2.5 MG: 2.5 SOLUTION RESPIRATORY (INHALATION) at 04:03

## 2019-10-24 RX ADMIN — IPRATROPIUM BROMIDE AND ALBUTEROL SULFATE 3 ML: 2.5; .5 SOLUTION RESPIRATORY (INHALATION) at 16:03

## 2019-10-24 RX ADMIN — IPRATROPIUM BROMIDE AND ALBUTEROL SULFATE 3 ML: 2.5; .5 SOLUTION RESPIRATORY (INHALATION) at 13:06

## 2019-10-24 RX ADMIN — HALOPERIDOL LACTATE 2 MG: 5 INJECTION, SOLUTION INTRAMUSCULAR at 04:00

## 2019-10-25 LAB
BACTERIA SPEC AEROBE CULT: NORMAL
BACTERIA SPEC AEROBE CULT: NORMAL

## 2019-10-25 PROCEDURE — 25010000002 CEFTRIAXONE PER 250 MG: Performed by: HOSPITALIST

## 2019-10-25 PROCEDURE — 94799 UNLISTED PULMONARY SVC/PX: CPT

## 2019-10-25 PROCEDURE — 94660 CPAP INITIATION&MGMT: CPT

## 2019-10-25 PROCEDURE — 97110 THERAPEUTIC EXERCISES: CPT

## 2019-10-25 PROCEDURE — 99233 SBSQ HOSP IP/OBS HIGH 50: CPT | Performed by: INTERNAL MEDICINE

## 2019-10-25 PROCEDURE — 97530 THERAPEUTIC ACTIVITIES: CPT

## 2019-10-25 PROCEDURE — 25010000002 HEPARIN (PORCINE) PER 1000 UNITS: Performed by: HOSPITALIST

## 2019-10-25 RX ADMIN — SODIUM CHLORIDE, PRESERVATIVE FREE 10 ML: 5 INJECTION INTRAVENOUS at 21:18

## 2019-10-25 RX ADMIN — SODIUM CHLORIDE, PRESERVATIVE FREE 10 ML: 5 INJECTION INTRAVENOUS at 08:21

## 2019-10-25 RX ADMIN — MULTIPLE VITAMINS W/ MINERALS TAB 1 TABLET: TAB ORAL at 08:21

## 2019-10-25 RX ADMIN — IPRATROPIUM BROMIDE AND ALBUTEROL SULFATE 3 ML: 2.5; .5 SOLUTION RESPIRATORY (INHALATION) at 12:48

## 2019-10-25 RX ADMIN — IPRATROPIUM BROMIDE AND ALBUTEROL SULFATE 3 ML: 2.5; .5 SOLUTION RESPIRATORY (INHALATION) at 19:11

## 2019-10-25 RX ADMIN — ATENOLOL 100 MG: 50 TABLET ORAL at 06:28

## 2019-10-25 RX ADMIN — MONTELUKAST SODIUM 10 MG: 10 TABLET, COATED ORAL at 21:18

## 2019-10-25 RX ADMIN — HEPARIN SODIUM 5000 UNITS: 5000 INJECTION, SOLUTION INTRAVENOUS; SUBCUTANEOUS at 21:18

## 2019-10-25 RX ADMIN — HEPARIN SODIUM 5000 UNITS: 5000 INJECTION, SOLUTION INTRAVENOUS; SUBCUTANEOUS at 14:58

## 2019-10-25 RX ADMIN — DOXYCYCLINE 100 MG: 100 CAPSULE ORAL at 08:21

## 2019-10-25 RX ADMIN — GUAIFENESIN 400 MG: 100 SOLUTION ORAL at 14:58

## 2019-10-25 RX ADMIN — HEPARIN SODIUM 5000 UNITS: 5000 INJECTION, SOLUTION INTRAVENOUS; SUBCUTANEOUS at 06:32

## 2019-10-25 RX ADMIN — GUAIFENESIN 400 MG: 100 SOLUTION ORAL at 08:21

## 2019-10-25 RX ADMIN — ASPIRIN 81 MG: 81 TABLET, COATED ORAL at 08:21

## 2019-10-25 RX ADMIN — GUAIFENESIN 400 MG: 100 SOLUTION ORAL at 21:18

## 2019-10-25 RX ADMIN — CEFTRIAXONE 1 G: 1 INJECTION, POWDER, FOR SOLUTION INTRAMUSCULAR; INTRAVENOUS at 13:14

## 2019-10-25 RX ADMIN — IPRATROPIUM BROMIDE AND ALBUTEROL SULFATE 3 ML: 2.5; .5 SOLUTION RESPIRATORY (INHALATION) at 07:48

## 2019-10-25 RX ADMIN — IPRATROPIUM BROMIDE AND ALBUTEROL SULFATE 3 ML: 2.5; .5 SOLUTION RESPIRATORY (INHALATION) at 16:28

## 2019-10-25 RX ADMIN — Medication 250 MG: at 08:21

## 2019-10-25 RX ADMIN — DOXYCYCLINE 100 MG: 100 CAPSULE ORAL at 21:18

## 2019-10-25 RX ADMIN — Medication 250 MG: at 21:18

## 2019-10-26 ENCOUNTER — APPOINTMENT (OUTPATIENT)
Dept: CARDIOLOGY | Facility: HOSPITAL | Age: 79
End: 2019-10-26

## 2019-10-26 LAB
ANION GAP SERPL CALCULATED.3IONS-SCNC: 8 MMOL/L (ref 5–15)
BASOPHILS # BLD AUTO: 0.03 10*3/MM3 (ref 0–0.2)
BASOPHILS NFR BLD AUTO: 1 % (ref 0–1.5)
BH CV UPPER VENOUS LEFT AXILLARY AUGMENT: NORMAL
BH CV UPPER VENOUS LEFT AXILLARY COMPRESS: NORMAL
BH CV UPPER VENOUS LEFT AXILLARY PHASIC: NORMAL
BH CV UPPER VENOUS LEFT AXILLARY SPONT: NORMAL
BH CV UPPER VENOUS LEFT BASILIC FOREARM COMPRESS: NORMAL
BH CV UPPER VENOUS LEFT BASILIC UPPER COMPRESS: NORMAL
BH CV UPPER VENOUS LEFT BRACHIAL AUGMENT: NORMAL
BH CV UPPER VENOUS LEFT BRACHIAL COMPRESS: NORMAL
BH CV UPPER VENOUS LEFT INTERNAL JUGULAR AUGMENT: NORMAL
BH CV UPPER VENOUS LEFT INTERNAL JUGULAR COMPRESS: NORMAL
BH CV UPPER VENOUS LEFT INTERNAL JUGULAR PHASIC: NORMAL
BH CV UPPER VENOUS LEFT INTERNAL JUGULAR SPONT: NORMAL
BH CV UPPER VENOUS LEFT RADIAL AUGMENT: NORMAL
BH CV UPPER VENOUS LEFT RADIAL COMPRESS: NORMAL
BH CV UPPER VENOUS LEFT SUBCLAVIAN AUGMENT: NORMAL
BH CV UPPER VENOUS LEFT SUBCLAVIAN COMPRESS: NORMAL
BH CV UPPER VENOUS LEFT SUBCLAVIAN PHASIC: NORMAL
BH CV UPPER VENOUS LEFT SUBCLAVIAN SPONT: NORMAL
BH CV UPPER VENOUS LEFT ULNAR AUGMENT: NORMAL
BH CV UPPER VENOUS LEFT ULNAR COMPRESS: NORMAL
BH CV UPPER VENOUS RIGHT INTERNAL JUGULAR AUGMENT: NORMAL
BH CV UPPER VENOUS RIGHT INTERNAL JUGULAR COMPRESS: NORMAL
BH CV UPPER VENOUS RIGHT INTERNAL JUGULAR PHASIC: NORMAL
BH CV UPPER VENOUS RIGHT INTERNAL JUGULAR SPONT: NORMAL
BUN BLD-MCNC: 17 MG/DL (ref 8–23)
BUN/CREAT SERPL: 13.2 (ref 7–25)
CALCIUM SPEC-SCNC: 8.7 MG/DL (ref 8.6–10.5)
CHLORIDE SERPL-SCNC: 101 MMOL/L (ref 98–107)
CO2 SERPL-SCNC: 30 MMOL/L (ref 22–29)
CREAT BLD-MCNC: 1.29 MG/DL (ref 0.57–1)
DEPRECATED RDW RBC AUTO: 47.9 FL (ref 37–54)
EOSINOPHIL # BLD AUTO: 0.06 10*3/MM3 (ref 0–0.4)
EOSINOPHIL NFR BLD AUTO: 2 % (ref 0.3–6.2)
ERYTHROCYTE [DISTWIDTH] IN BLOOD BY AUTOMATED COUNT: 15.3 % (ref 12.3–15.4)
GFR SERPL CREATININE-BSD FRML MDRD: 40 ML/MIN/1.73
GLUCOSE BLD-MCNC: 102 MG/DL (ref 65–99)
HCT VFR BLD AUTO: 34.7 % (ref 34–46.6)
HGB BLD-MCNC: 10.1 G/DL (ref 12–15.9)
IMM GRANULOCYTES # BLD AUTO: 0.02 10*3/MM3 (ref 0–0.05)
IMM GRANULOCYTES NFR BLD AUTO: 0.7 % (ref 0–0.5)
LYMPHOCYTES # BLD AUTO: 0.8 10*3/MM3 (ref 0.7–3.1)
LYMPHOCYTES NFR BLD AUTO: 26.6 % (ref 19.6–45.3)
MCH RBC QN AUTO: 25.1 PG (ref 26.6–33)
MCHC RBC AUTO-ENTMCNC: 29.1 G/DL (ref 31.5–35.7)
MCV RBC AUTO: 86.3 FL (ref 79–97)
MONOCYTES # BLD AUTO: 0.26 10*3/MM3 (ref 0.1–0.9)
MONOCYTES NFR BLD AUTO: 8.6 % (ref 5–12)
NEUTROPHILS # BLD AUTO: 1.84 10*3/MM3 (ref 1.7–7)
NEUTROPHILS NFR BLD AUTO: 61.1 % (ref 42.7–76)
NRBC BLD AUTO-RTO: 0 /100 WBC (ref 0–0.2)
PLATELET # BLD AUTO: 84 10*3/MM3 (ref 140–450)
PMV BLD AUTO: 10.6 FL (ref 6–12)
POTASSIUM BLD-SCNC: 4.9 MMOL/L (ref 3.5–5.2)
RBC # BLD AUTO: 4.02 10*6/MM3 (ref 3.77–5.28)
SODIUM BLD-SCNC: 139 MMOL/L (ref 136–145)
WBC NRBC COR # BLD: 3.01 10*3/MM3 (ref 3.4–10.8)

## 2019-10-26 PROCEDURE — 94799 UNLISTED PULMONARY SVC/PX: CPT

## 2019-10-26 PROCEDURE — 93971 EXTREMITY STUDY: CPT

## 2019-10-26 PROCEDURE — 85025 COMPLETE CBC W/AUTO DIFF WBC: CPT | Performed by: INTERNAL MEDICINE

## 2019-10-26 PROCEDURE — 93971 EXTREMITY STUDY: CPT | Performed by: INTERNAL MEDICINE

## 2019-10-26 PROCEDURE — 25010000002 CEFTRIAXONE PER 250 MG: Performed by: HOSPITALIST

## 2019-10-26 PROCEDURE — 99232 SBSQ HOSP IP/OBS MODERATE 35: CPT | Performed by: INTERNAL MEDICINE

## 2019-10-26 PROCEDURE — 80048 BASIC METABOLIC PNL TOTAL CA: CPT | Performed by: INTERNAL MEDICINE

## 2019-10-26 PROCEDURE — 25010000002 HEPARIN (PORCINE) PER 1000 UNITS: Performed by: HOSPITALIST

## 2019-10-26 RX ORDER — LOSARTAN POTASSIUM 50 MG/1
100 TABLET ORAL
Status: DISCONTINUED | OUTPATIENT
Start: 2019-10-26 | End: 2019-10-27 | Stop reason: HOSPADM

## 2019-10-26 RX ADMIN — GUAIFENESIN 400 MG: 100 SOLUTION ORAL at 20:50

## 2019-10-26 RX ADMIN — GUAIFENESIN 400 MG: 100 SOLUTION ORAL at 21:00

## 2019-10-26 RX ADMIN — HEPARIN SODIUM 5000 UNITS: 5000 INJECTION, SOLUTION INTRAVENOUS; SUBCUTANEOUS at 14:03

## 2019-10-26 RX ADMIN — MONTELUKAST SODIUM 10 MG: 10 TABLET, COATED ORAL at 20:49

## 2019-10-26 RX ADMIN — HEPARIN SODIUM 5000 UNITS: 5000 INJECTION, SOLUTION INTRAVENOUS; SUBCUTANEOUS at 20:49

## 2019-10-26 RX ADMIN — ATENOLOL 100 MG: 50 TABLET ORAL at 06:35

## 2019-10-26 RX ADMIN — LOSARTAN POTASSIUM 100 MG: 50 TABLET, FILM COATED ORAL at 14:03

## 2019-10-26 RX ADMIN — CEFTRIAXONE 1 G: 1 INJECTION, POWDER, FOR SOLUTION INTRAMUSCULAR; INTRAVENOUS at 14:03

## 2019-10-26 RX ADMIN — HEPARIN SODIUM 5000 UNITS: 5000 INJECTION, SOLUTION INTRAVENOUS; SUBCUTANEOUS at 23:19

## 2019-10-26 RX ADMIN — GUAIFENESIN 400 MG: 100 SOLUTION ORAL at 06:36

## 2019-10-26 RX ADMIN — HEPARIN SODIUM 5000 UNITS: 5000 INJECTION, SOLUTION INTRAVENOUS; SUBCUTANEOUS at 06:35

## 2019-10-26 RX ADMIN — GUAIFENESIN 400 MG: 100 SOLUTION ORAL at 15:01

## 2019-10-26 RX ADMIN — SODIUM CHLORIDE, PRESERVATIVE FREE 10 ML: 5 INJECTION INTRAVENOUS at 20:49

## 2019-10-26 RX ADMIN — DOXYCYCLINE 100 MG: 100 CAPSULE ORAL at 08:10

## 2019-10-26 RX ADMIN — BENZONATATE 100 MG: 100 CAPSULE ORAL at 15:03

## 2019-10-26 RX ADMIN — IPRATROPIUM BROMIDE AND ALBUTEROL SULFATE 3 ML: 2.5; .5 SOLUTION RESPIRATORY (INHALATION) at 07:37

## 2019-10-26 RX ADMIN — Medication 250 MG: at 08:10

## 2019-10-26 RX ADMIN — Medication 250 MG: at 20:49

## 2019-10-26 RX ADMIN — DOXYCYCLINE 100 MG: 100 CAPSULE ORAL at 20:49

## 2019-10-26 RX ADMIN — MULTIPLE VITAMINS W/ MINERALS TAB 1 TABLET: TAB ORAL at 08:10

## 2019-10-26 RX ADMIN — ASPIRIN 81 MG: 81 TABLET, COATED ORAL at 08:10

## 2019-10-26 RX ADMIN — IPRATROPIUM BROMIDE AND ALBUTEROL SULFATE 3 ML: 2.5; .5 SOLUTION RESPIRATORY (INHALATION) at 20:25

## 2019-10-26 RX ADMIN — IPRATROPIUM BROMIDE AND ALBUTEROL SULFATE 3 ML: 2.5; .5 SOLUTION RESPIRATORY (INHALATION) at 16:10

## 2019-10-26 RX ADMIN — IPRATROPIUM BROMIDE AND ALBUTEROL SULFATE 3 ML: 2.5; .5 SOLUTION RESPIRATORY (INHALATION) at 12:39

## 2019-10-27 VITALS
BODY MASS INDEX: 24.48 KG/M2 | OXYGEN SATURATION: 97 % | SYSTOLIC BLOOD PRESSURE: 151 MMHG | DIASTOLIC BLOOD PRESSURE: 84 MMHG | RESPIRATION RATE: 18 BRPM | TEMPERATURE: 97.7 F | HEIGHT: 62 IN | HEART RATE: 66 BPM | WEIGHT: 133 LBS

## 2019-10-27 PROCEDURE — 99239 HOSP IP/OBS DSCHRG MGMT >30: CPT | Performed by: INTERNAL MEDICINE

## 2019-10-27 PROCEDURE — 25010000002 HEPARIN (PORCINE) PER 1000 UNITS: Performed by: HOSPITALIST

## 2019-10-27 PROCEDURE — 94799 UNLISTED PULMONARY SVC/PX: CPT

## 2019-10-27 RX ORDER — IPRATROPIUM BROMIDE AND ALBUTEROL SULFATE 2.5; .5 MG/3ML; MG/3ML
3 SOLUTION RESPIRATORY (INHALATION) 4 TIMES DAILY PRN
Qty: 360 ML
Start: 2019-10-27 | End: 2019-11-07

## 2019-10-27 RX ADMIN — DOXYCYCLINE 100 MG: 100 CAPSULE ORAL at 09:24

## 2019-10-27 RX ADMIN — ATENOLOL 100 MG: 50 TABLET ORAL at 09:30

## 2019-10-27 RX ADMIN — GUAIFENESIN 400 MG: 100 SOLUTION ORAL at 05:55

## 2019-10-27 RX ADMIN — IPRATROPIUM BROMIDE AND ALBUTEROL SULFATE 3 ML: 2.5; .5 SOLUTION RESPIRATORY (INHALATION) at 00:22

## 2019-10-27 RX ADMIN — IPRATROPIUM BROMIDE AND ALBUTEROL SULFATE 3 ML: 2.5; .5 SOLUTION RESPIRATORY (INHALATION) at 04:12

## 2019-10-27 RX ADMIN — ASPIRIN 81 MG: 81 TABLET, COATED ORAL at 09:24

## 2019-10-27 RX ADMIN — Medication 250 MG: at 09:24

## 2019-10-27 RX ADMIN — IPRATROPIUM BROMIDE AND ALBUTEROL SULFATE 3 ML: 2.5; .5 SOLUTION RESPIRATORY (INHALATION) at 07:03

## 2019-10-27 RX ADMIN — MULTIPLE VITAMINS W/ MINERALS TAB 1 TABLET: TAB ORAL at 09:24

## 2019-10-27 RX ADMIN — HEPARIN SODIUM 5000 UNITS: 5000 INJECTION, SOLUTION INTRAVENOUS; SUBCUTANEOUS at 05:55

## 2019-10-27 RX ADMIN — LOSARTAN POTASSIUM 100 MG: 50 TABLET, FILM COATED ORAL at 09:24

## 2019-10-27 RX ADMIN — IPRATROPIUM BROMIDE AND ALBUTEROL SULFATE 3 ML: 2.5; .5 SOLUTION RESPIRATORY (INHALATION) at 10:40

## 2019-10-28 ENCOUNTER — TELEPHONE (OUTPATIENT)
Dept: ONCOLOGY | Facility: CLINIC | Age: 79
End: 2019-10-28

## 2019-10-28 ENCOUNTER — EPISODE CHANGES (OUTPATIENT)
Dept: CASE MANAGEMENT | Facility: OTHER | Age: 79
End: 2019-10-28

## 2019-10-28 NOTE — TELEPHONE ENCOUNTER
The patient has been released from the hospital to [facility]. Her son wants to know if she should be taking her Imbruvica while she is there. He can be reached at [phone].

## 2019-10-29 NOTE — TELEPHONE ENCOUNTER
Called son, Yared, left . Let him know that per Dr Alvarez ok to resume Ibrance.       Called and gave verbal order to nurse at [facility] ok to resume Ibrance. She said pt has her own supply with her at the bedside.

## 2019-11-01 ENCOUNTER — TELEPHONE (OUTPATIENT)
Dept: FAMILY MEDICINE CLINIC | Facility: CLINIC | Age: 79
End: 2019-11-01

## 2019-11-04 ENCOUNTER — TELEPHONE (OUTPATIENT)
Dept: FAMILY MEDICINE CLINIC | Facility: CLINIC | Age: 79
End: 2019-11-04

## 2019-11-04 NOTE — TELEPHONE ENCOUNTER
BLANE FROM [facility] CALLED TO REQUEST A VERBAL RELEASE FROM DR. BERUMEN TO CONTINUE OCCUPATIONAL THERAPY AND PHYSICAL THERAPY FOR THE PATIENT. BLANE SAID THAT DR. BERUMEN HAD AGREED TO SIGN THE PATIENT'S HOME HEALTH PAPERS AND THAT SHE JUST NEEDED A VERBAL RELEASE OVER THE PHONE TO CONTINUE OT AND PT FOR THE PATIENT. PLEASE CALL BLANE BACK [phone] WITH THE VERBAL RELEASE WHEN THIS MESSAGE IS RECEIVED.

## 2019-11-07 ENCOUNTER — OFFICE VISIT (OUTPATIENT)
Dept: ONCOLOGY | Facility: CLINIC | Age: 79
End: 2019-11-07

## 2019-11-07 ENCOUNTER — LAB (OUTPATIENT)
Dept: LAB | Facility: HOSPITAL | Age: 79
End: 2019-11-07

## 2019-11-07 ENCOUNTER — OFFICE VISIT (OUTPATIENT)
Dept: FAMILY MEDICINE CLINIC | Facility: CLINIC | Age: 79
End: 2019-11-07

## 2019-11-07 VITALS
OXYGEN SATURATION: 100 % | HEART RATE: 70 BPM | HEIGHT: 62 IN | DIASTOLIC BLOOD PRESSURE: 64 MMHG | BODY MASS INDEX: 24.66 KG/M2 | RESPIRATION RATE: 16 BRPM | TEMPERATURE: 98.2 F | SYSTOLIC BLOOD PRESSURE: 142 MMHG | WEIGHT: 134 LBS

## 2019-11-07 VITALS
HEIGHT: 62 IN | BODY MASS INDEX: 24.73 KG/M2 | SYSTOLIC BLOOD PRESSURE: 136 MMHG | DIASTOLIC BLOOD PRESSURE: 70 MMHG | HEART RATE: 62 BPM | WEIGHT: 134.4 LBS | RESPIRATION RATE: 16 BRPM | OXYGEN SATURATION: 96 %

## 2019-11-07 DIAGNOSIS — J18.9 PNEUMONIA OF BOTH LUNGS DUE TO INFECTIOUS ORGANISM, UNSPECIFIED PART OF LUNG: Primary | ICD-10-CM

## 2019-11-07 DIAGNOSIS — C88.0 WALDENSTROM MACROGLOBULINEMIA (HCC): ICD-10-CM

## 2019-11-07 DIAGNOSIS — C88.0 WALDENSTROM MACROGLOBULINEMIA (HCC): Primary | Chronic | ICD-10-CM

## 2019-11-07 DIAGNOSIS — R09.02 HYPOXIA: ICD-10-CM

## 2019-11-07 LAB
ALBUMIN SERPL-MCNC: 3.6 G/DL (ref 3.5–5.2)
ALBUMIN/GLOB SERPL: 1.2 G/DL
ALP SERPL-CCNC: 56 U/L (ref 39–117)
ALT SERPL W P-5'-P-CCNC: 5 U/L (ref 1–33)
ANION GAP SERPL CALCULATED.3IONS-SCNC: 10 MMOL/L (ref 5–15)
AST SERPL-CCNC: 12 U/L (ref 1–32)
BILIRUB SERPL-MCNC: 0.7 MG/DL (ref 0.2–1.2)
BUN BLD-MCNC: 17 MG/DL (ref 8–23)
BUN/CREAT SERPL: 11.6 (ref 7–25)
CALCIUM SPEC-SCNC: 9 MG/DL (ref 8.6–10.5)
CHLORIDE SERPL-SCNC: 95 MMOL/L (ref 98–107)
CO2 SERPL-SCNC: 35 MMOL/L (ref 22–29)
CREAT BLD-MCNC: 1.46 MG/DL (ref 0.57–1)
ERYTHROCYTE [DISTWIDTH] IN BLOOD BY AUTOMATED COUNT: 17.1 % (ref 12.3–15.4)
GFR SERPL CREATININE-BSD FRML MDRD: 34 ML/MIN/1.73
GLOBULIN UR ELPH-MCNC: 3.1 GM/DL
GLUCOSE BLD-MCNC: 111 MG/DL (ref 65–99)
HCT VFR BLD AUTO: 34.8 % (ref 34–46.6)
HGB BLD-MCNC: 10.7 G/DL (ref 12–15.9)
LYMPHOCYTES # BLD AUTO: 1.6 10*3/MM3 (ref 0.7–3.1)
LYMPHOCYTES NFR BLD AUTO: 33.5 % (ref 19.6–45.3)
MCH RBC QN AUTO: 26.1 PG (ref 26.6–33)
MCHC RBC AUTO-ENTMCNC: 30.6 G/DL (ref 31.5–35.7)
MCV RBC AUTO: 85.1 FL (ref 79–97)
MONOCYTES # BLD AUTO: 0.2 10*3/MM3 (ref 0.1–0.9)
MONOCYTES NFR BLD AUTO: 4.2 % (ref 5–12)
NEUTROPHILS # BLD AUTO: 3 10*3/MM3 (ref 1.7–7)
NEUTROPHILS NFR BLD AUTO: 62.3 % (ref 42.7–76)
PLATELET # BLD AUTO: 144 10*3/MM3 (ref 140–450)
PMV BLD AUTO: 7.2 FL (ref 6–12)
POTASSIUM BLD-SCNC: 4.2 MMOL/L (ref 3.5–5.2)
PROT SERPL-MCNC: 6.7 G/DL (ref 6–8.5)
RBC # BLD AUTO: 4.09 10*6/MM3 (ref 3.77–5.28)
SODIUM BLD-SCNC: 140 MMOL/L (ref 136–145)
WBC NRBC COR # BLD: 4.8 10*3/MM3 (ref 3.4–10.8)

## 2019-11-07 PROCEDURE — 84165 PROTEIN E-PHORESIS SERUM: CPT

## 2019-11-07 PROCEDURE — 80053 COMPREHEN METABOLIC PANEL: CPT

## 2019-11-07 PROCEDURE — 99213 OFFICE O/P EST LOW 20 MIN: CPT | Performed by: FAMILY MEDICINE

## 2019-11-07 PROCEDURE — 85025 COMPLETE CBC W/AUTO DIFF WBC: CPT

## 2019-11-07 PROCEDURE — 36415 COLL VENOUS BLD VENIPUNCTURE: CPT

## 2019-11-07 PROCEDURE — 99213 OFFICE O/P EST LOW 20 MIN: CPT | Performed by: INTERNAL MEDICINE

## 2019-11-07 PROCEDURE — 82784 ASSAY IGA/IGD/IGG/IGM EACH: CPT

## 2019-11-07 PROCEDURE — 86334 IMMUNOFIX E-PHORESIS SERUM: CPT

## 2019-11-07 RX ORDER — MONTELUKAST SODIUM 10 MG/1
10 TABLET ORAL NIGHTLY PRN
COMMUNITY
End: 2020-01-01

## 2019-11-07 RX ORDER — ALBUTEROL SULFATE 2.5 MG/3ML
2.5 SOLUTION RESPIRATORY (INHALATION) EVERY 4 HOURS PRN
Qty: 225 ML | Refills: 1 | Status: SHIPPED | OUTPATIENT
Start: 2019-11-07 | End: 2019-11-12 | Stop reason: SDUPTHER

## 2019-11-07 RX ORDER — ALBUTEROL SULFATE 90 UG/1
2 AEROSOL, METERED RESPIRATORY (INHALATION) EVERY 6 HOURS PRN
Qty: 18 G | Refills: 3 | Status: SHIPPED | OUTPATIENT
Start: 2019-11-07 | End: 2019-11-12 | Stop reason: SDUPTHER

## 2019-11-07 RX ORDER — BENZONATATE 200 MG/1
200 CAPSULE ORAL 3 TIMES DAILY PRN
Qty: 30 CAPSULE | Refills: 0 | Status: SHIPPED | OUTPATIENT
Start: 2019-11-07 | End: 2019-01-01

## 2019-11-07 NOTE — PROGRESS NOTES
"  PROBLEM LIST:  1. Waldenstrom's macroglobulinemia:  a) Splenomegaly was noted on a CT of the chest on 06/06/2015 done for weight  loss, decreased appetite and left upper quadrant pain.   b) Lab evaluation on 06/16/2015 showed an anemia with hemoglobin 10.2, thrombocytopenia with platelets 116, a 0.6 g/dL IgM lambda monoclonal protein was identified on SPEP, beta-2 microglobulin was 10.4, . 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   c) Treatment with Ibrutinib was started 07/2015.   2. Hypertension.   3. Essential tremor.   4. Scoliosis.     Subjective       Chief complaint: Management of Waldenstrom's macroglobulinemia    HISTORY OF PRESENT ILLNESS:   Ms. Yusuf returns for follow-up.  She was recently admitted to the hospital with pneumonia and afterwards went to [facility] for rehab.  She says she is recovering.  She is able to move around her house and she is wearing oxygen all the time.  She does not anticipate needing this long-term.  Walking from the car into the office today was very difficult for her.    Past Medical History, Past Surgical History, Social History, Family History have been reviewed and are without significant changes except as mentioned.    Review of Systems   A comprehensive 14 point review of systems was performed and was negative except as mentioned.    Medications:  The current medication list was reviewed in the EMR    ALLERGIES:    Allergies   Allergen Reactions   • Bactrim [Sulfamethoxazole-Trimethoprim] Other (See Comments)     unknown   • Sulfa Antibiotics Other (See Comments)     unknown   • Vancomycin Other (See Comments)     unknown   • Zosyn [Piperacillin Sod-Tazobactam So] Other (See Comments)     unknown       Objective      /64   Pulse 70   Temp 98.2 °F (36.8 °C) (Temporal)   Resp 16   Ht 157.5 cm (62\")   Wt 60.8 kg (134 lb)   " SpO2 100%   BMI 24.51 kg/m²      Performance Status: 1    General: well appearing female in no acute distress  Neuro: alert and oriented  HEENT: sclera anicteric, oropharynx clear  Lymphatics: no cervical, supraclavicular, or axillary adenopathy  Cardiovascular: regular rate and rhythm, no murmurs  Lungs: clear to auscultation bilaterally  Abdomen: soft, nontender  Extremeties: 1+ lower extremity edema bilaterally  Skin: Moderate bruising on forearms  Psych: mood and affect appropriate    Labs:   Results for [facility] (MRN [id number]) as of 11/7/2019 09:45   Ref. Range 10/22/2019 03:44   Globulin Latest Ref Range: 2.2 - 3.9 g/dL 2.5   Alpha-1-Globulin Latest Ref Range: 0.0 - 0.4 g/dL 0.2   Alpha-2-Globulin Latest Ref Range: 0.4 - 1.0 g/dL 0.4   Beta Globulin Latest Ref Range: 0.7 - 1.3 g/dL 0.8   Gamma Globulin Latest Ref Range: 0.4 - 1.8 g/dL 1.0   M-Torrey Latest Ref Range: Not Observed g/dL 0.6 (H)   Immunofixation Reflex, Serum Unknown Comment     Results for LUPE HILL (MRN [id number]) as of 11/7/2019 09:45   Ref. Range 10/22/2019 03:44   IgA Latest Ref Range: 64 - 422 mg/dL 14 (L)   IgG Latest Ref Range: 700 - 1600 mg/dL 276 (L)   IgM Latest Ref Range: 26 - 217 mg/dL 1359 (H)     Results for LUPE HILL (MRN [id number]) as of 11/7/2019 09:45   Ref. Range 11/7/2019 09:36   WBC Latest Ref Range: 3.40 - 10.80 10*3/mm3 4.80   RBC Latest Ref Range: 3.77 - 5.28 10*6/mm3 4.09   Hemoglobin Latest Ref Range: 12.0 - 15.9 g/dL 10.7 (L)   Hematocrit Latest Ref Range: 34.0 - 46.6 % 34.8   RDW Latest Ref Range: 12.3 - 15.4 % 17.1 (H)   MCV Latest Ref Range: 79.0 - 97.0 fL 85.1   MCH Latest Ref Range: 26.6 - 33.0 pg 26.1 (L)   MCHC Latest Ref Range: 31.5 - 35.7 g/dL 30.6 (L)   MPV Latest Ref Range: 6.0 - 12.0 fL 7.2   Platelets Latest Ref Range: 140 - 450 10*3/mm3 144   Neutrophil Rel % Latest Ref Range: 42.7 - 76.0 % 62.3   Lymphocyte Rel % Latest Ref Range: 19.6 - 45.3 % 33.5   Monocyte Rel % Latest  Ref Range: 5.0 - 12.0 % 4.2 (L)   Neutrophils Absolute Latest Ref Range: 1.70 - 7.00 10*3/mm3 3.00   Lymphocytes Absolute Latest Ref Range: 0.70 - 3.10 10*3/mm3 1.60   Monocytes Absolute Latest Ref Range: 0.10 - 0.90 10*3/mm3 0.20     Assessment/Plan   Carin Yusuf is a 79 y.o. female with Waldenström's macroglobulinemia who returns for follow up.     She is recovering from her pneumonia but remains somewhat debilitated.    In terms of her Waldenstrom's, we will plan to continue ibrutinib at the current dose.  I do not think she is a candidate for more intensive treatment.  Her IgM protein is gradually increasing but it does not seem to be causing any symptoms and the rate of increase is fairly slow.  We will continue to monitor.    F/u in 3 months.                   I spent 15 minutes with the patient. I spent > 50% percent of this time counseling and discussing prognosis, diagnostic testing, evaluation, current status and management.      Kasey Alvarez MD  [facility]    11/7/2019          CC:

## 2019-11-08 ENCOUNTER — TELEPHONE (OUTPATIENT)
Dept: FAMILY MEDICINE CLINIC | Facility: CLINIC | Age: 79
End: 2019-11-08

## 2019-11-08 LAB
ALBUMIN SERPL-MCNC: 3.3 G/DL (ref 2.9–4.4)
ALBUMIN/GLOB SERPL: 1.2 {RATIO} (ref 0.7–1.7)
ALPHA1 GLOB FLD ELPH-MCNC: 0.3 G/DL (ref 0–0.4)
ALPHA2 GLOB SERPL ELPH-MCNC: 0.7 G/DL (ref 0.4–1)
B-GLOBULIN SERPL ELPH-MCNC: 1.8 G/DL (ref 0.7–1.3)
GAMMA GLOB SERPL ELPH-MCNC: 0.3 G/DL (ref 0.4–1.8)
GLOBULIN SER CALC-MCNC: 3 G/DL (ref 2.2–3.9)
IGA SERPL-MCNC: 17 MG/DL (ref 64–422)
IGG SERPL-MCNC: 315 MG/DL (ref 700–1600)
IGM SERPL-MCNC: 1744 MG/DL (ref 26–217)
INTERPRETATION SERPL IEP-IMP: ABNORMAL
Lab: ABNORMAL
M-SPIKE: 0.9 G/DL
PROT SERPL-MCNC: 6.3 G/DL (ref 6–8.5)

## 2019-11-08 NOTE — PROGRESS NOTES
"Subjective   Carin Yusuf is a 79 y.o. female.     Pneumonia   She complains of cough, difficulty breathing and shortness of breath. There is no hemoptysis. Primary symptoms comments: Recent hospital stay for hypoxia and pneumonia  Feeling better but weak and still on oxygen. This is a new problem. The cough is non-productive. Associated symptoms include dyspnea on exertion. Pertinent negatives include no chest pain, fever, myalgias or sore throat. Her symptoms are aggravated by minimal activity. Her symptoms are alleviated by rest and beta-agonist. She reports moderate improvement on treatment. There are no known risk factors for lung disease. Her past medical history is significant for pneumonia.        The following portions of the patient's history were reviewed and updated as appropriate: allergies, current medications, past social history and problem list.    Review of Systems   Constitutional: Negative for fever.   HENT: Negative for congestion and sore throat.    Respiratory: Positive for cough and shortness of breath. Negative for hemoptysis.    Cardiovascular: Positive for dyspnea on exertion and leg swelling. Negative for chest pain.   Gastrointestinal: Negative for diarrhea, nausea and vomiting.   Genitourinary: Negative for dysuria and hematuria.   Musculoskeletal: Negative for joint swelling and myalgias.   Skin: Negative.    Neurological: Negative for dizziness and light-headedness.       Objective   /70   Pulse 62   Resp 16   Ht 157.5 cm (62\")   Wt 61 kg (134 lb 6.4 oz)   SpO2 96%   BMI 24.58 kg/m²   Physical Exam   Constitutional: She is oriented to person, place, and time. She appears well-developed and well-nourished. She is cooperative.   HENT:   Head: Normocephalic.   Right Ear: External ear normal.   Left Ear: External ear normal.   Nose: Nose normal.   Mouth/Throat: Oropharynx is clear and moist.   Eyes: Conjunctivae are normal. Pupils are equal, round, and reactive to light. " No scleral icterus.   Neck: Neck supple. Carotid bruit is not present. No thyromegaly present.   Cardiovascular: Normal rate, regular rhythm, normal heart sounds and intact distal pulses.   Pulmonary/Chest: Effort normal and breath sounds normal. She has no wheezes. She has no rales.   Abdominal: There is no hepatosplenomegaly.   Musculoskeletal: Normal range of motion. She exhibits edema.   Neurological: She is alert and oriented to person, place, and time.   No focal deficits no lateralizing signs   Skin: Skin is warm and dry. No rash noted.   Psychiatric: She has a normal mood and affect. Cognition and memory are normal.   Nursing note and vitals reviewed.      Assessment/Plan   Problem List Items Addressed This Visit     None      Visit Diagnoses     Pneumonia of both lungs due to infectious organism, unspecified part of lung    -  Primary    Relevant Medications    albuterol sulfate HFA (VENTOLIN HFA) 108 (90 Base) MCG/ACT inhaler    albuterol (PROVENTIL) (2.5 MG/3ML) 0.083% nebulizer solution    benzonatate (TESSALON) 200 MG capsule    montelukast (SINGULAIR) 10 MG tablet    Other Relevant Orders    XR Chest 2 View    Hypoxia              New Medications Ordered This Visit   Medications   • albuterol sulfate HFA (VENTOLIN HFA) 108 (90 Base) MCG/ACT inhaler     Sig: Inhale 2 puffs Every 6 (Six) Hours As Needed for Wheezing.     Dispense:  18 g     Refill:  3   • albuterol (PROVENTIL) (2.5 MG/3ML) 0.083% nebulizer solution     Sig: Take 2.5 mg by nebulization Every 4 (Four) Hours As Needed for Wheezing.     Dispense:  225 mL     Refill:  1   • benzonatate (TESSALON) 200 MG capsule     Sig: Take 1 capsule by mouth 3 (Three) Times a Day As Needed for Cough.     Dispense:  30 capsule     Refill:  0     Try taking off oxygen for short periods and monitor pulse ox always wear to bed.cxr prior to next visit.

## 2019-11-08 NOTE — TELEPHONE ENCOUNTER
Informed alberto from [facility] of which medications where discontinued and what medications were added.

## 2019-11-08 NOTE — TELEPHONE ENCOUNTER
BLANE FROM IN [facility]  CALLED ASKING ABOUT 2 MEDICATION CHANGES SHE HAD QUESTIONS ABOUT. STATING DR. BERUMEN TOOK PT OFF THESE     ALSO ASKING ABOUT THE O2 AND HOW MUCH IS SHE SUPPOSE TO BE ON.     PLEASE ADVISE AND GIVE CALL BACK   [phone] OFFICE (CARLEY)    PLEASE FAX OVER NEW MED LIST  TO FAX  [phone]

## 2019-11-11 ENCOUNTER — TELEPHONE (OUTPATIENT)
Dept: FAMILY MEDICINE CLINIC | Facility: CLINIC | Age: 79
End: 2019-11-11

## 2019-11-11 NOTE — TELEPHONE ENCOUNTER
Patient was in the office last week and some of her medications were changed. Home Health Nurse called and said patient is confused about what she is supposed to take and what not to take. Please call Lupe at [facility] [phone] to go over her medication changes. Thank you

## 2019-11-12 ENCOUNTER — TELEPHONE (OUTPATIENT)
Dept: FAMILY MEDICINE CLINIC | Facility: CLINIC | Age: 79
End: 2019-11-12

## 2019-11-12 RX ORDER — ALBUTEROL SULFATE 90 UG/1
2 AEROSOL, METERED RESPIRATORY (INHALATION) EVERY 6 HOURS PRN
Qty: 18 G | Refills: 3 | Status: SHIPPED | OUTPATIENT
Start: 2019-11-12 | End: 2020-01-01

## 2019-11-12 RX ORDER — ALBUTEROL SULFATE 2.5 MG/3ML
2.5 SOLUTION RESPIRATORY (INHALATION) EVERY 4 HOURS PRN
Qty: 225 ML | Refills: 1 | Status: SHIPPED | OUTPATIENT
Start: 2019-11-12 | End: 2020-01-01

## 2019-11-12 NOTE — TELEPHONE ENCOUNTER
Rx resent with Dx code.  EUGENE COX    ----- Message from Marry Galo sent at 11/12/2019  8:08 AM EST -----  Regarding: FW: ILBUTEROL      ----- Message -----  From: Danica Cantrell RegSched Rep  Sent: 11/11/2019   1:28 PM  To: Marry Galo  Subject: ILBUTEROL                                        [facility] [phone] NEEDS SCRIPT RESENT WITH DIAGNOSIS CODE FOR MEDICARE PLEASE CALL THEM

## 2019-11-13 NOTE — TELEPHONE ENCOUNTER
Pt is confused if she should still be taking Hydralazine, Amlodipine, and Hydrochlorothiazide even though they aren't on her medication list.

## 2019-11-14 ENCOUNTER — EPISODE CHANGES (OUTPATIENT)
Dept: CASE MANAGEMENT | Facility: OTHER | Age: 79
End: 2019-11-14

## 2019-11-14 ENCOUNTER — TELEPHONE (OUTPATIENT)
Dept: FAMILY MEDICINE CLINIC | Facility: CLINIC | Age: 79
End: 2019-11-14

## 2019-11-14 ENCOUNTER — PATIENT OUTREACH (OUTPATIENT)
Dept: CASE MANAGEMENT | Facility: OTHER | Age: 79
End: 2019-11-14

## 2019-11-14 NOTE — TELEPHONE ENCOUNTER
Altagracia with [facility] called in regards to pt, homehealth per Dr Vasques was to trying to wean off her oxygen, and after 10 min, it goes down to 80s and that is sitting down; does he want to continue to have pt wean off oxygen?    Also, prior to [facility], pt was on amilopinie 10mg, and 2 other bp meds; [facility], took her off  amilopinie and switched her to hydralazine  10mg 3 x dayl; pt is concerned that her amilopinie, her bp was better than with the medication [facility]o placed her on; wants to switch back to her original regimine;When she took, her bp with new medication, 141/81, and with other meds, it runs considerably lower..    Please advise and call Altagracia

## 2019-11-14 NOTE — TELEPHONE ENCOUNTER
Stop trying to wean for a week then try again  Should not take hydralazine or amlodipine we have discussed this with her

## 2019-11-15 NOTE — OUTREACH NOTE
Care Plan Note      Responses   Annual Wellness Visit:   Patient Has Completed   Care Gaps Addressed  Flu Shot, Pneumonia Vaccine   Flu Shot Status  Up to Date   Pneumonia Vaccine Status  Up to Date   Other Patient Education/Resources   Advanced Care Planning, MyChart   ACP Education Method  Send Materials   MyChart Education Method  Send Materials   Does patient have depression diagnosis?  No   Advanced Directives:  Send Materials   Ed Visits past 12 months:  None   Hospitalizations past 12 months  1   Discharged From:  [facility]   Discharged to:  [facility]   Admit Date:  10/20/19   Discharge Date:  10/27/19   Discharge destination:  Rehab [[facility] 10-27-19 TO 11-3-19 ,  THEN [facility] SET UP]   Medication Adherence  Medications understood        The main concerns and/or symptoms the patient would like to address are:  Patient states she is doing alright now.  Has been home from [facility] since 11-3-19, and [facility] Health PT and OT have been coming to see her.  Patient lives at home with her son.  Patient voiced independence with ADL's, Mobility (using a Rolling Walker), and Medications.  Voiced compliance with all medications daily as ordered.  Stated her breathing is okay; she is wearing oxygen, and stated she hopes she does not have to wear it long; is compliant with Nebulizer treatments prn.  Has seen PCP since getting home; next appt 12-6-19.    Education/instruction provided by Care Coordinator:   Explained role of Care Advisor and contact information given to patient.  Discussed Home Health services.  Discussed Advanced Directives and MyChart; patient requested information on both be sent to her.  Information to be sent as requested.  Discussed importance of close PCP follow up; reviewed appts; son drives and goes with patient to appts.    Noted AWV is up to date; no Care Gaps noted.  No questions or  concerns voiced at this time.    Follow Up Outreach Due: as needed.    Jenny Cardoso RN  Community Care Coordinator    11/15/2019, 9:37 AM

## 2019-11-15 NOTE — OUTREACH NOTE
Care Coordination Assessment    Documented/Reviewed By:  Jenny Cardoso RN Date/time:  11/14/2019  5:25 PM   Assessment completed with:  patient  Enrolled in care management program:  No  Living arrangement:  children  Support system:  family  Type of residence:  private residence  Home care services:  Yes (Comment: [facility])  Equipment used at home:  cane, walker, oxygen/respiratory treatment  Communication device:  No  Bed or wheelchair confined:  No  Inadequate nutrition:  No  Medication adherence problem:  No  Experiencing side effects from current medications:  No  Difficulty keeping appointments:  No  Family aware of the patient's advance care planning wishes:  Yes

## 2019-11-18 ENCOUNTER — EPISODE CHANGES (OUTPATIENT)
Dept: CASE MANAGEMENT | Facility: OTHER | Age: 79
End: 2019-11-18

## 2019-11-19 ENCOUNTER — TELEPHONE (OUTPATIENT)
Dept: FAMILY MEDICINE CLINIC | Facility: CLINIC | Age: 79
End: 2019-11-19

## 2019-11-19 NOTE — TELEPHONE ENCOUNTER
Patient called in regards to her medication. She requests being put back on Analodapin ( spelling? )     Please return call and advise.

## 2019-11-21 ENCOUNTER — TELEPHONE (OUTPATIENT)
Dept: FAMILY MEDICINE CLINIC | Facility: CLINIC | Age: 79
End: 2019-11-21

## 2019-11-21 NOTE — TELEPHONE ENCOUNTER
Adamaris with [facility]  [phone] called and left a voicemail at 9:53 am stating that she is planning to continue OT with the pt for more weeks.

## 2019-11-29 ENCOUNTER — HOSPITAL ENCOUNTER (OUTPATIENT)
Dept: GENERAL RADIOLOGY | Facility: HOSPITAL | Age: 79
Discharge: HOME | End: 2019-11-29
Admitting: FAMILY MEDICINE

## 2019-11-29 DIAGNOSIS — J18.9 PNEUMONIA OF BOTH LUNGS DUE TO INFECTIOUS ORGANISM, UNSPECIFIED PART OF LUNG: ICD-10-CM

## 2019-11-29 PROCEDURE — 71046 X-RAY EXAM CHEST 2 VIEWS: CPT

## 2019-12-09 NOTE — PROGRESS NOTES
Subjective   Carin Yusuf is a 79 y.o. female.     Pneumonia   She complains of cough, difficulty breathing and shortness of breath. There is no hemoptysis or sputum production. Primary symptoms comments: Slowly improving strength still dependent upon oxygen. This is a new problem. The cough is non-productive. Associated symptoms include dyspnea on exertion. Pertinent negatives include no chest pain, fever, myalgias or sore throat. Her symptoms are aggravated by minimal activity. Her symptoms are alleviated by rest and beta-agonist. She reports moderate improvement on treatment. There are no known risk factors for lung disease. Her past medical history is significant for pneumonia.   Leg Swelling   This is a recurrent problem. The current episode started 1 to 4 weeks ago. The problem occurs constantly. The problem has been gradually worsening. Associated symptoms include congestion and coughing. Pertinent negatives include no chest pain, fever, myalgias, nausea, sore throat or vomiting. Nothing aggravates the symptoms. She has tried position changes for the symptoms. The treatment provided no relief.        The following portions of the patient's history were reviewed and updated as appropriate: allergies, current medications, past social history and problem list.    Review of Systems   Constitutional: Negative for fever.   HENT: Positive for congestion. Negative for sore throat.         Having problems with nasal congestion has been using an over-the-counter product   Respiratory: Positive for cough and shortness of breath. Negative for hemoptysis and sputum production.    Cardiovascular: Positive for dyspnea on exertion and leg swelling. Negative for chest pain.   Gastrointestinal: Negative for diarrhea, nausea and vomiting.   Endocrine: Negative for cold intolerance and heat intolerance.   Genitourinary: Negative for dysuria and hematuria.   Musculoskeletal: Negative for myalgias.   Skin: Negative.   "      Objective   /82   Pulse 59   Resp 16   Ht 157.5 cm (62\")   Wt 63 kg (139 lb)   SpO2 91%   BMI 25.42 kg/m²   Physical Exam   Constitutional: She is oriented to person, place, and time. She appears well-developed and well-nourished. She is cooperative.   HENT:   Head: Normocephalic.   Right Ear: External ear normal.   Left Ear: External ear normal.   Nose: Nose normal.   Mouth/Throat: Oropharynx is clear and moist.   Eyes: Pupils are equal, round, and reactive to light. Conjunctivae are normal. No scleral icterus.   Neck: Neck supple. Carotid bruit is not present. No thyromegaly present.   Cardiovascular: Normal rate and regular rhythm.   Pulmonary/Chest: Effort normal.   Decreased breath sounds at the bases   Abdominal: There is no hepatosplenomegaly.   Musculoskeletal: Normal range of motion. She exhibits edema.   Significant pitting edema of the lower extremities up to the knees   Neurological: She is alert and oriented to person, place, and time.   No focal deficits no lateralizing signs   Skin: Skin is warm and dry. No rash noted.   Psychiatric: She has a normal mood and affect. Cognition and memory are normal.   Nursing note and vitals reviewed.      Assessment/Plan   Problem List Items Addressed This Visit        Active Problems    Essential hypertension    Pneumonia of left lower lobe due to infectious organism (CMS/HCC) - Primary    Relevant Medications    ipratropium (ATROVENT) 0.06 % nasal spray      Other Visit Diagnoses     Localized edema              New Medications Ordered This Visit   Medications   • ipratropium (ATROVENT) 0.06 % nasal spray     Si sprays into the nostril(s) as directed by provider 3 (Three) Times a Day.     Dispense:  15 mL     Refill:  2     Chest x-ray was reviewed and showed some mild improvement in some decrease in the pleural effusions.  Increase furosemide to 40 mg daily with 8 mEq of potassium he is advised we will need to monitor kidney function and " electrolytes closely

## 2019-12-27 NOTE — TELEPHONE ENCOUNTER
Pt called and stated that she is having sinus issues. She stated that her nose is stopped up and its hard for her to breathe. Pt uses an oxygen nasal cannula. Pt is requesting a prescription for something to help her breathe better. Please send to [facility] on [address]

## 2019-12-27 NOTE — TELEPHONE ENCOUNTER
She has a prescription from Atrovent nasal spray that he gave her at last visit.  She can also use plain Mucinex over-the-counter.  She may need to be seen.

## 2020-01-01 ENCOUNTER — TELEPHONE (OUTPATIENT)
Dept: FAMILY MEDICINE CLINIC | Facility: CLINIC | Age: 80
End: 2020-01-01

## 2020-01-01 ENCOUNTER — TELEMEDICINE (OUTPATIENT)
Dept: FAMILY MEDICINE CLINIC | Facility: CLINIC | Age: 80
End: 2020-01-01

## 2020-01-01 ENCOUNTER — OFFICE VISIT (OUTPATIENT)
Dept: ONCOLOGY | Facility: CLINIC | Age: 80
End: 2020-01-01

## 2020-01-01 ENCOUNTER — TRANSCRIBE ORDERS (OUTPATIENT)
Dept: LAB | Facility: HOSPITAL | Age: 80
End: 2020-01-01

## 2020-01-01 ENCOUNTER — LAB (OUTPATIENT)
Dept: LAB | Facility: HOSPITAL | Age: 80
End: 2020-01-01

## 2020-01-01 ENCOUNTER — APPOINTMENT (OUTPATIENT)
Dept: GENERAL RADIOLOGY | Facility: HOSPITAL | Age: 80
End: 2020-01-01

## 2020-01-01 ENCOUNTER — OFFICE VISIT (OUTPATIENT)
Dept: FAMILY MEDICINE CLINIC | Facility: CLINIC | Age: 80
End: 2020-01-01

## 2020-01-01 ENCOUNTER — TELEPHONE (OUTPATIENT)
Dept: ONCOLOGY | Facility: CLINIC | Age: 80
End: 2020-01-01

## 2020-01-01 ENCOUNTER — HOSPITAL ENCOUNTER (INPATIENT)
Facility: HOSPITAL | Age: 80
End: 2020-01-01
Attending: INTERNAL MEDICINE | Admitting: INTERNAL MEDICINE

## 2020-01-01 ENCOUNTER — APPOINTMENT (OUTPATIENT)
Dept: NUCLEAR MEDICINE | Facility: HOSPITAL | Age: 80
End: 2020-01-01

## 2020-01-01 ENCOUNTER — HOSPITAL ENCOUNTER (OUTPATIENT)
Dept: GENERAL RADIOLOGY | Facility: HOSPITAL | Age: 80
Discharge: HOME | End: 2020-01-10
Admitting: FAMILY MEDICINE

## 2020-01-01 ENCOUNTER — APPOINTMENT (OUTPATIENT)
Dept: CARDIOLOGY | Facility: HOSPITAL | Age: 80
End: 2020-01-01

## 2020-01-01 ENCOUNTER — HOSPITAL ENCOUNTER (OUTPATIENT)
Dept: GENERAL RADIOLOGY | Facility: HOSPITAL | Age: 80
Discharge: HOME | End: 2020-09-16
Admitting: FAMILY MEDICINE

## 2020-01-01 ENCOUNTER — HOSPITAL ENCOUNTER (INPATIENT)
Facility: HOSPITAL | Age: 80
LOS: 2 days | End: 2020-12-03
Attending: EMERGENCY MEDICINE | Admitting: FAMILY MEDICINE

## 2020-01-01 ENCOUNTER — APPOINTMENT (OUTPATIENT)
Dept: CT IMAGING | Facility: HOSPITAL | Age: 80
End: 2020-01-01

## 2020-01-01 ENCOUNTER — DOCUMENTATION (OUTPATIENT)
Dept: FAMILY MEDICINE CLINIC | Facility: CLINIC | Age: 80
End: 2020-01-01

## 2020-01-01 VITALS
TEMPERATURE: 97.2 F | DIASTOLIC BLOOD PRESSURE: 70 MMHG | SYSTOLIC BLOOD PRESSURE: 159 MMHG | OXYGEN SATURATION: 94 % | BODY MASS INDEX: 25.4 KG/M2 | HEART RATE: 59 BPM | WEIGHT: 138 LBS | RESPIRATION RATE: 20 BRPM | HEIGHT: 62 IN

## 2020-01-01 VITALS
OXYGEN SATURATION: 96 % | HEART RATE: 57 BPM | BODY MASS INDEX: 24.11 KG/M2 | HEIGHT: 62 IN | SYSTOLIC BLOOD PRESSURE: 114 MMHG | RESPIRATION RATE: 16 BRPM | DIASTOLIC BLOOD PRESSURE: 42 MMHG | TEMPERATURE: 97.8 F | WEIGHT: 131 LBS

## 2020-01-01 VITALS
HEIGHT: 62 IN | BODY MASS INDEX: 25.17 KG/M2 | WEIGHT: 136.8 LBS | DIASTOLIC BLOOD PRESSURE: 72 MMHG | HEART RATE: 57 BPM | RESPIRATION RATE: 16 BRPM | SYSTOLIC BLOOD PRESSURE: 136 MMHG | OXYGEN SATURATION: 95 %

## 2020-01-01 VITALS
SYSTOLIC BLOOD PRESSURE: 162 MMHG | WEIGHT: 134 LBS | RESPIRATION RATE: 16 BRPM | BODY MASS INDEX: 24.66 KG/M2 | OXYGEN SATURATION: 97 % | DIASTOLIC BLOOD PRESSURE: 84 MMHG | TEMPERATURE: 97.3 F | HEART RATE: 62 BPM | HEIGHT: 62 IN

## 2020-01-01 VITALS
HEART RATE: 60 BPM | SYSTOLIC BLOOD PRESSURE: 130 MMHG | OXYGEN SATURATION: 98 % | RESPIRATION RATE: 18 BRPM | HEIGHT: 62 IN | TEMPERATURE: 98 F | DIASTOLIC BLOOD PRESSURE: 64 MMHG | BODY MASS INDEX: 23.74 KG/M2 | WEIGHT: 129 LBS

## 2020-01-01 VITALS
TEMPERATURE: 96.8 F | SYSTOLIC BLOOD PRESSURE: 134 MMHG | WEIGHT: 130 LBS | BODY MASS INDEX: 23.92 KG/M2 | HEIGHT: 62 IN | DIASTOLIC BLOOD PRESSURE: 66 MMHG

## 2020-01-01 VITALS — OXYGEN SATURATION: 98 % | DIASTOLIC BLOOD PRESSURE: 62 MMHG | TEMPERATURE: 97.4 F | SYSTOLIC BLOOD PRESSURE: 138 MMHG

## 2020-01-01 VITALS
SYSTOLIC BLOOD PRESSURE: 125 MMHG | HEIGHT: 62 IN | WEIGHT: 130 LBS | OXYGEN SATURATION: 97 % | DIASTOLIC BLOOD PRESSURE: 76 MMHG | BODY MASS INDEX: 23.92 KG/M2 | TEMPERATURE: 97.1 F

## 2020-01-01 DIAGNOSIS — I10 ESSENTIAL HYPERTENSION: Primary | ICD-10-CM

## 2020-01-01 DIAGNOSIS — C88.0 WALDENSTROM MACROGLOBULINEMIA (HCC): ICD-10-CM

## 2020-01-01 DIAGNOSIS — R41.89 UNRESPONSIVE EPISODE: Primary | ICD-10-CM

## 2020-01-01 DIAGNOSIS — N18.30 CHRONIC RENAL IMPAIRMENT, STAGE 3 (MODERATE) (HCC): ICD-10-CM

## 2020-01-01 DIAGNOSIS — J96.21 ACUTE ON CHRONIC RESPIRATORY FAILURE WITH HYPOXIA (HCC): ICD-10-CM

## 2020-01-01 DIAGNOSIS — C88.0 WALDENSTROM MACROGLOBULINEMIA (HCC): Primary | Chronic | ICD-10-CM

## 2020-01-01 DIAGNOSIS — N18.30 CHRONIC KIDNEY DISEASE, STAGE III (MODERATE) (HCC): Primary | ICD-10-CM

## 2020-01-01 DIAGNOSIS — N95.2 POST-MENOPAUSAL ATROPHIC VAGINITIS: Primary | ICD-10-CM

## 2020-01-01 DIAGNOSIS — I10 ESSENTIAL HYPERTENSION: ICD-10-CM

## 2020-01-01 DIAGNOSIS — E87.20 LACTIC ACIDOSIS: ICD-10-CM

## 2020-01-01 DIAGNOSIS — R60.0 LOCALIZED EDEMA: ICD-10-CM

## 2020-01-01 DIAGNOSIS — J18.9 PNEUMONIA OF LEFT LOWER LOBE DUE TO INFECTIOUS ORGANISM: ICD-10-CM

## 2020-01-01 DIAGNOSIS — R05.9 COUGH IN ADULT: ICD-10-CM

## 2020-01-01 DIAGNOSIS — J18.9 PNEUMONIA OF LEFT LOWER LOBE DUE TO INFECTIOUS ORGANISM: Primary | ICD-10-CM

## 2020-01-01 DIAGNOSIS — E78.49 OTHER HYPERLIPIDEMIA: ICD-10-CM

## 2020-01-01 DIAGNOSIS — Z99.81 SUPPLEMENTAL OXYGEN DEPENDENT: ICD-10-CM

## 2020-01-01 DIAGNOSIS — R09.02 HYPOXIA: ICD-10-CM

## 2020-01-01 DIAGNOSIS — E55.9 VITAMIN D DEFICIENCY: ICD-10-CM

## 2020-01-01 DIAGNOSIS — C88.0 WALDENSTROM MACROGLOBULINEMIA (HCC): Primary | ICD-10-CM

## 2020-01-01 DIAGNOSIS — J40 BRONCHITIS: ICD-10-CM

## 2020-01-01 DIAGNOSIS — E86.9 VOLUME DEPLETION: ICD-10-CM

## 2020-01-01 DIAGNOSIS — R05.9 COUGH IN ADULT: Primary | ICD-10-CM
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
## 2020-01-01 DIAGNOSIS — N18.30 CHRONIC KIDNEY DISEASE, STAGE III (MODERATE) (HCC): ICD-10-CM

## 2020-01-01 LAB
25(OH)D3 SERPL-MCNC: 47.3 NG/ML (ref 30–100)
25(OH)D3 SERPL-MCNC: 49.7 NG/ML (ref 30–100)
ABO GROUP BLD: NORMAL
ABO GROUP BLD: NORMAL
ALBUMIN SERPL ELPH-MCNC: 3.4 G/DL (ref 2.9–4.4)
ALBUMIN SERPL-MCNC: 3.1 G/DL (ref 3.5–5.2)
ALBUMIN SERPL-MCNC: 3.3 G/DL (ref 2.9–4.4)
ALBUMIN SERPL-MCNC: 3.5 G/DL (ref 2.9–4.4)
ALBUMIN SERPL-MCNC: 3.5 G/DL (ref 2.9–4.4)
ALBUMIN SERPL-MCNC: 3.5 G/DL (ref 3.5–5.2)
ALBUMIN SERPL-MCNC: 3.6 G/DL (ref 3.5–5.2)
ALBUMIN SERPL-MCNC: 3.8 G/DL (ref 3.5–5.2)
ALBUMIN SERPL-MCNC: 3.9 G/DL (ref 3.5–5.2)
ALBUMIN SERPL-MCNC: 4 G/DL (ref 3.5–5.2)
ALBUMIN SERPL-MCNC: 4 G/DL (ref 3.5–5.2)
ALBUMIN/GLOB SERPL: 1 G/DL
ALBUMIN/GLOB SERPL: 1.1 G/DL
ALBUMIN/GLOB SERPL: 1.2 {RATIO} (ref 0.7–1.7)
ALBUMIN/GLOB SERPL: 1.3 G/DL
ALBUMIN/GLOB SERPL: 1.3 {RATIO} (ref 0.7–1.7)
ALBUMIN/GLOB SERPL: 1.3 {RATIO} (ref 0.7–1.7)
ALBUMIN/GLOB SERPL: 1.4 G/DL
ALBUMIN/GLOB SERPL: 1.4 G/DL
ALBUMIN/GLOB SERPL: 1.4 {RATIO} (ref 0.7–1.7)
ALBUMIN/GLOB SERPL: 1.5 G/DL
ALP SERPL-CCNC: 61 U/L (ref 39–117)
ALP SERPL-CCNC: 66 U/L (ref 39–117)
ALP SERPL-CCNC: 68 U/L (ref 39–117)
ALP SERPL-CCNC: 75 U/L (ref 39–117)
ALP SERPL-CCNC: 75 U/L (ref 39–117)
ALP SERPL-CCNC: 90 U/L (ref 39–117)
ALPHA1 GLOB FLD ELPH-MCNC: 0.2 G/DL (ref 0–0.4)
ALPHA1 GLOB FLD ELPH-MCNC: 0.2 G/DL (ref 0–0.4)
ALPHA1 GLOB FLD ELPH-MCNC: 0.3 G/DL (ref 0–0.4)
ALPHA1 GLOB SERPL ELPH-MCNC: 0.2 G/DL (ref 0–0.4)
ALPHA2 GLOB SERPL ELPH-MCNC: 0.6 G/DL (ref 0.4–1)
ALT SERPL W P-5'-P-CCNC: 5 U/L (ref 1–33)
ALT SERPL W P-5'-P-CCNC: 6 U/L (ref 1–33)
ALT SERPL W P-5'-P-CCNC: 8 U/L (ref 1–33)
ALT SERPL W P-5'-P-CCNC: <5 U/L (ref 1–33)
ANION GAP SERPL CALCULATED.3IONS-SCNC: 10 MMOL/L (ref 5–15)
ANION GAP SERPL CALCULATED.3IONS-SCNC: 10 MMOL/L (ref 5–15)
ANION GAP SERPL CALCULATED.3IONS-SCNC: 11 MMOL/L (ref 5–15)
ANION GAP SERPL CALCULATED.3IONS-SCNC: 11 MMOL/L (ref 5–15)
ANION GAP SERPL CALCULATED.3IONS-SCNC: 12 MMOL/L (ref 5–15)
ANION GAP SERPL CALCULATED.3IONS-SCNC: 12.1 MMOL/L (ref 5–15)
ANION GAP SERPL CALCULATED.3IONS-SCNC: 7 MMOL/L (ref 5–15)
ANION GAP SERPL CALCULATED.3IONS-SCNC: 8 MMOL/L (ref 5–15)
ANION GAP SERPL CALCULATED.3IONS-SCNC: 9 MMOL/L (ref 5–15)
ANION GAP SERPL CALCULATED.3IONS-SCNC: 9.3 MMOL/L (ref 5–15)
APTT PPP: 34.8 SECONDS (ref 24–37)
ARTERIAL PATENCY WRIST A: ABNORMAL
ARTERIAL PATENCY WRIST A: ABNORMAL
AST SERPL-CCNC: 11 U/L (ref 1–32)
AST SERPL-CCNC: 11 U/L (ref 1–32)
AST SERPL-CCNC: 14 U/L (ref 1–32)
AST SERPL-CCNC: 16 U/L (ref 1–32)
AST SERPL-CCNC: 20 U/L (ref 1–32)
AST SERPL-CCNC: 35 U/L (ref 1–32)
ATMOSPHERIC PRESS: ABNORMAL MM[HG]
ATMOSPHERIC PRESS: ABNORMAL MM[HG]
B PARAPERT DNA SPEC QL NAA+PROBE: NOT DETECTED
B PERT DNA SPEC QL NAA+PROBE: NOT DETECTED
B-GLOBULIN SERPL ELPH-MCNC: 0.7 G/DL (ref 0.7–1.3)
B-GLOBULIN SERPL ELPH-MCNC: 1.6 G/DL (ref 0.7–1.3)
B-GLOBULIN SERPL ELPH-MCNC: 1.7 G/DL (ref 0.7–1.3)
B-GLOBULIN SERPL ELPH-MCNC: 1.7 G/DL (ref 0.7–1.3)
BACTERIA UR QL AUTO: NORMAL /HPF
BASE EXCESS BLDA CALC-SCNC: 10.5 MMOL/L (ref 0–2)
BASE EXCESS BLDA CALC-SCNC: 16.7 MMOL/L (ref 0–2)
BASOPHILS # BLD AUTO: 0.01 10*3/MM3 (ref 0–0.2)
BASOPHILS # BLD AUTO: 0.02 10*3/MM3 (ref 0–0.2)
BASOPHILS # BLD AUTO: 0.03 10*3/MM3 (ref 0–0.2)
BASOPHILS # BLD MANUAL: 0 10*3/MM3 (ref 0–0.2)
BASOPHILS NFR BLD AUTO: 0 % (ref 0–1.5)
BASOPHILS NFR BLD AUTO: 0.2 % (ref 0–1.5)
BASOPHILS NFR BLD AUTO: 0.5 % (ref 0–1.5)
BASOPHILS NFR BLD AUTO: 0.8 % (ref 0–1.5)
BDY SITE: ABNORMAL
BDY SITE: ABNORMAL
BILIRUB SERPL-MCNC: 0.6 MG/DL (ref 0.2–1.2)
BILIRUB SERPL-MCNC: 0.7 MG/DL (ref 0–1.2)
BILIRUB SERPL-MCNC: 0.7 MG/DL (ref 0–1.2)
BILIRUB SERPL-MCNC: 0.8 MG/DL (ref 0.2–1.2)
BILIRUB SERPL-MCNC: 0.8 MG/DL (ref 0–1.2)
BILIRUB SERPL-MCNC: 0.9 MG/DL (ref 0–1.2)
BILIRUB UR QL STRIP: NEGATIVE
BLD GP AB SCN SERPL QL: NEGATIVE
BODY TEMPERATURE: 37 C
BODY TEMPERATURE: 37 C
BUN BLD-MCNC: 20 MG/DL (ref 8–23)
BUN BLD-MCNC: 21 MG/DL (ref 8–23)
BUN BLD-MCNC: 25 MG/DL (ref 8–23)
BUN SERPL-MCNC: 17 MG/DL (ref 8–23)
BUN SERPL-MCNC: 21 MG/DL (ref 8–23)
BUN SERPL-MCNC: 24 MG/DL (ref 8–23)
BUN SERPL-MCNC: 29 MG/DL (ref 8–23)
BUN SERPL-MCNC: 42 MG/DL (ref 8–23)
BUN SERPL-MCNC: 43 MG/DL (ref 8–23)
BUN SERPL-MCNC: 43 MG/DL (ref 8–23)
BUN/CREAT SERPL: 11.8 (ref 7–25)
BUN/CREAT SERPL: 12.9 (ref 7–25)
BUN/CREAT SERPL: 13.5 (ref 7–25)
BUN/CREAT SERPL: 13.5 (ref 7–25)
BUN/CREAT SERPL: 14 (ref 7–25)
BUN/CREAT SERPL: 14.9 (ref 7–25)
BUN/CREAT SERPL: 17.4 (ref 7–25)
BUN/CREAT SERPL: 18.6 (ref 7–25)
BUN/CREAT SERPL: 19.5 (ref 7–25)
BUN/CREAT SERPL: 19.7 (ref 7–25)
C PNEUM DNA NPH QL NAA+NON-PROBE: NOT DETECTED
CALCIUM SPEC-SCNC: 10 MG/DL (ref 8.6–10.5)
CALCIUM SPEC-SCNC: 8.2 MG/DL (ref 8.6–10.5)
CALCIUM SPEC-SCNC: 8.3 MG/DL (ref 8.6–10.5)
CALCIUM SPEC-SCNC: 8.6 MG/DL (ref 8.6–10.5)
CALCIUM SPEC-SCNC: 8.7 MG/DL (ref 8.6–10.5)
CALCIUM SPEC-SCNC: 8.9 MG/DL (ref 8.6–10.5)
CALCIUM SPEC-SCNC: 9 MG/DL (ref 8.6–10.5)
CALCIUM SPEC-SCNC: 9.1 MG/DL (ref 8.6–10.5)
CHLORIDE SERPL-SCNC: 100 MMOL/L (ref 98–107)
CHLORIDE SERPL-SCNC: 102 MMOL/L (ref 98–107)
CHLORIDE SERPL-SCNC: 90 MMOL/L (ref 98–107)
CHLORIDE SERPL-SCNC: 92 MMOL/L (ref 98–107)
CHLORIDE SERPL-SCNC: 94 MMOL/L (ref 98–107)
CHLORIDE SERPL-SCNC: 95 MMOL/L (ref 98–107)
CHLORIDE SERPL-SCNC: 95 MMOL/L (ref 98–107)
CHLORIDE SERPL-SCNC: 97 MMOL/L (ref 98–107)
CHLORIDE SERPL-SCNC: 97 MMOL/L (ref 98–107)
CHLORIDE SERPL-SCNC: 98 MMOL/L (ref 98–107)
CHOLEST SERPL-MCNC: 128 MG/DL (ref 0–200)
CLARITY UR: CLEAR
CO2 BLDA-SCNC: 41.1 MMOL/L (ref 22–33)
CO2 BLDA-SCNC: 42.8 MMOL/L (ref 22–33)
CO2 SERPL-SCNC: 26.9 MMOL/L (ref 22–29)
CO2 SERPL-SCNC: 30 MMOL/L (ref 22–29)
CO2 SERPL-SCNC: 34.7 MMOL/L (ref 22–29)
CO2 SERPL-SCNC: 36 MMOL/L (ref 22–29)
CO2 SERPL-SCNC: 36 MMOL/L (ref 22–29)
CO2 SERPL-SCNC: 37 MMOL/L (ref 22–29)
CO2 SERPL-SCNC: 37 MMOL/L (ref 22–29)
CO2 SERPL-SCNC: 39 MMOL/L (ref 22–29)
CO2 SERPL-SCNC: 40 MMOL/L (ref 22–29)
CO2 SERPL-SCNC: 42 MMOL/L (ref 22–29)
COHGB MFR BLD: 1.5 % (ref 0–2)
COHGB MFR BLD: 1.8 % (ref 0–2)
COLOR UR: YELLOW
CREAT BLD-MCNC: 1.55 MG/DL (ref 0.57–1)
CREAT BLD-MCNC: 1.56 MG/DL (ref 0.57–1)
CREAT BLD-MCNC: 1.78 MG/DL (ref 0.57–1)
CREAT SERPL-MCNC: 1.44 MG/DL (ref 0.57–1)
CREAT SERPL-MCNC: 1.55 MG/DL (ref 0.57–1)
CREAT SERPL-MCNC: 1.56 MG/DL (ref 0.57–1)
CREAT SERPL-MCNC: 1.61 MG/DL (ref 0.57–1)
CREAT SERPL-MCNC: 2.18 MG/DL (ref 0.57–1)
CREAT SERPL-MCNC: 2.21 MG/DL (ref 0.57–1)
CREAT SERPL-MCNC: 2.42 MG/DL (ref 0.57–1)
CREAT UR-MCNC: 159.3 MG/DL
CREAT UR-MCNC: 32.5 MG/DL
D-LACTATE SERPL-SCNC: 0.5 MMOL/L (ref 0.5–2)
D-LACTATE SERPL-SCNC: 2.5 MMOL/L (ref 0.5–2)
DEPRECATED RDW RBC AUTO: 41.8 FL (ref 37–54)
DEPRECATED RDW RBC AUTO: 45.3 FL (ref 37–54)
DEPRECATED RDW RBC AUTO: 46.2 FL (ref 37–54)
DEPRECATED RDW RBC AUTO: 49.8 FL (ref 37–54)
DEPRECATED RDW RBC AUTO: 54.1 FL (ref 37–54)
DEPRECATED RDW RBC AUTO: 54.4 FL (ref 37–54)
EOSINOPHIL # BLD AUTO: 0.01 10*3/MM3 (ref 0–0.4)
EOSINOPHIL # BLD AUTO: 0.03 10*3/MM3 (ref 0–0.4)
EOSINOPHIL # BLD AUTO: 0.03 10*3/MM3 (ref 0–0.4)
EOSINOPHIL # BLD MANUAL: 0 10*3/MM3 (ref 0–0.4)
EOSINOPHIL # BLD MANUAL: 0.03 10*3/MM3 (ref 0–0.4)
EOSINOPHIL # BLD MANUAL: 0.04 10*3/MM3 (ref 0–0.4)
EOSINOPHIL NFR BLD AUTO: 0.2 % (ref 0.3–6.2)
EOSINOPHIL NFR BLD AUTO: 0.8 % (ref 0.3–6.2)
EOSINOPHIL NFR BLD AUTO: 0.8 % (ref 0.3–6.2)
EOSINOPHIL NFR BLD MANUAL: 0 % (ref 0.3–6.2)
EOSINOPHIL NFR BLD MANUAL: 1 % (ref 0.3–6.2)
EOSINOPHIL NFR BLD MANUAL: 1 % (ref 0.3–6.2)
EPAP: 0
ERYTHROCYTE [DISTWIDTH] IN BLOOD BY AUTOMATED COUNT: 14.2 % (ref 12.3–15.4)
ERYTHROCYTE [DISTWIDTH] IN BLOOD BY AUTOMATED COUNT: 14.3 % (ref 12.3–15.4)
ERYTHROCYTE [DISTWIDTH] IN BLOOD BY AUTOMATED COUNT: 14.6 % (ref 12.3–15.4)
ERYTHROCYTE [DISTWIDTH] IN BLOOD BY AUTOMATED COUNT: 15 % (ref 12.3–15.4)
ERYTHROCYTE [DISTWIDTH] IN BLOOD BY AUTOMATED COUNT: 15.3 % (ref 12.3–15.4)
ERYTHROCYTE [DISTWIDTH] IN BLOOD BY AUTOMATED COUNT: 15.5 % (ref 12.3–15.4)
ERYTHROCYTE [DISTWIDTH] IN BLOOD BY AUTOMATED COUNT: 16.1 % (ref 12.3–15.4)
FERRITIN SERPL-MCNC: 105.2 NG/ML (ref 13–150)
FLUAV H1 2009 PAND RNA NPH QL NAA+PROBE: NOT DETECTED
FLUAV H1 HA GENE NPH QL NAA+PROBE: NOT DETECTED
FLUAV H3 RNA NPH QL NAA+PROBE: NOT DETECTED
FLUAV SUBTYP SPEC NAA+PROBE: NOT DETECTED
FLUBV RNA ISLT QL NAA+PROBE: NOT DETECTED
GAMMA GLOB SERPL ELPH-MCNC: 0.2 G/DL (ref 0.4–1.8)
GAMMA GLOB SERPL ELPH-MCNC: 0.3 G/DL (ref 0.4–1.8)
GAMMA GLOB SERPL ELPH-MCNC: 0.3 G/DL (ref 0.4–1.8)
GAMMA GLOB SERPL ELPH-MCNC: 1.1 G/DL (ref 0.4–1.8)
GFR SERPL CREATININE-BSD FRML MDRD: 19 ML/MIN/1.73
GFR SERPL CREATININE-BSD FRML MDRD: 21 ML/MIN/1.73
GFR SERPL CREATININE-BSD FRML MDRD: 22 ML/MIN/1.73
GFR SERPL CREATININE-BSD FRML MDRD: 27 ML/MIN/1.73
GFR SERPL CREATININE-BSD FRML MDRD: 31 ML/MIN/1.73
GFR SERPL CREATININE-BSD FRML MDRD: 32 ML/MIN/1.73
GFR SERPL CREATININE-BSD FRML MDRD: 35 ML/MIN/1.73
GLOBULIN SER CALC-MCNC: 2.7 G/DL (ref 2.2–3.9)
GLOBULIN SER CALC-MCNC: 2.8 G/DL (ref 2.2–3.9)
GLOBULIN SER CALC-MCNC: 2.8 G/DL (ref 2.2–3.9)
GLOBULIN SER-MCNC: 2.6 G/DL (ref 2.2–3.9)
GLOBULIN UR ELPH-MCNC: 2.6 GM/DL
GLOBULIN UR ELPH-MCNC: 2.6 GM/DL
GLOBULIN UR ELPH-MCNC: 2.8 GM/DL
GLOBULIN UR ELPH-MCNC: 2.9 GM/DL
GLOBULIN UR ELPH-MCNC: 3.2 GM/DL
GLOBULIN UR ELPH-MCNC: 3.2 GM/DL
GLUCOSE BLD-MCNC: 105 MG/DL (ref 65–99)
GLUCOSE BLD-MCNC: 136 MG/DL (ref 65–99)
GLUCOSE BLD-MCNC: 96 MG/DL (ref 65–99)
GLUCOSE BLDC GLUCOMTR-MCNC: 106 MG/DL (ref 70–130)
GLUCOSE BLDC GLUCOMTR-MCNC: 160 MG/DL (ref 70–130)
GLUCOSE SERPL-MCNC: 102 MG/DL (ref 65–99)
GLUCOSE SERPL-MCNC: 102 MG/DL (ref 65–99)
GLUCOSE SERPL-MCNC: 103 MG/DL (ref 65–99)
GLUCOSE SERPL-MCNC: 104 MG/DL (ref 65–99)
GLUCOSE SERPL-MCNC: 105 MG/DL (ref 65–99)
GLUCOSE SERPL-MCNC: 144 MG/DL (ref 65–99)
GLUCOSE SERPL-MCNC: 90 MG/DL (ref 65–99)
GLUCOSE UR STRIP-MCNC: NEGATIVE MG/DL
HADV DNA SPEC NAA+PROBE: NOT DETECTED
HCO3 BLDA-SCNC: 39.8 MMOL/L (ref 20–26)
HCO3 BLDA-SCNC: 39.8 MMOL/L (ref 20–26)
HCOV 229E RNA SPEC QL NAA+PROBE: NOT DETECTED
HCOV HKU1 RNA SPEC QL NAA+PROBE: NOT DETECTED
HCOV NL63 RNA SPEC QL NAA+PROBE: NOT DETECTED
HCOV OC43 RNA SPEC QL NAA+PROBE: NOT DETECTED
HCT VFR BLD AUTO: 28.3 % (ref 34–46.6)
HCT VFR BLD AUTO: 28.9 % (ref 34–46.6)
HCT VFR BLD AUTO: 29.6 % (ref 34–46.6)
HCT VFR BLD AUTO: 29.9 % (ref 34–46.6)
HCT VFR BLD AUTO: 30.4 % (ref 34–46.6)
HCT VFR BLD AUTO: 31.4 % (ref 34–46.6)
HCT VFR BLD AUTO: 33.6 % (ref 34–46.6)
HCT VFR BLD CALC: 23.2 %
HCT VFR BLD CALC: 23.5 %
HDLC SERPL-MCNC: 53 MG/DL (ref 40–60)
HGB BLD-MCNC: 7.8 G/DL (ref 12–15.9)
HGB BLD-MCNC: 7.8 G/DL (ref 12–15.9)
HGB BLD-MCNC: 8.3 G/DL (ref 12–15.9)
HGB BLD-MCNC: 8.9 G/DL (ref 12–15.9)
HGB BLD-MCNC: 9 G/DL (ref 12–15.9)
HGB BLD-MCNC: 9.4 G/DL (ref 12–15.9)
HGB BLD-MCNC: 9.5 G/DL (ref 12–15.9)
HGB BLDA-MCNC: 7.6 G/DL (ref 14–18)
HGB BLDA-MCNC: 7.7 G/DL (ref 14–18)
HGB UR QL STRIP.AUTO: ABNORMAL
HGB UR QL STRIP.AUTO: ABNORMAL
HGB UR QL STRIP.AUTO: NEGATIVE
HMPV RNA NPH QL NAA+NON-PROBE: NOT DETECTED
HPIV1 RNA SPEC QL NAA+PROBE: NOT DETECTED
HPIV2 RNA SPEC QL NAA+PROBE: NOT DETECTED
HPIV3 RNA NPH QL NAA+PROBE: NOT DETECTED
HPIV4 P GENE NPH QL NAA+PROBE: NOT DETECTED
HYALINE CASTS UR QL AUTO: NORMAL /LPF
HYPOCHROMIA BLD QL: ABNORMAL
HYPOCHROMIA BLD QL: ABNORMAL
HYPOCHROMIA BLD QL: NORMAL
IGA SERPL-MCNC: 13 MG/DL (ref 64–422)
IGA SERPL-MCNC: 14 MG/DL (ref 64–422)
IGG SERPL-MCNC: 299 MG/DL (ref 586–1602)
IGG SERPL-MCNC: 319 MG/DL (ref 700–1600)
IGG SERPL-MCNC: 325 MG/DL (ref 586–1602)
IGG SERPL-MCNC: 328 MG/DL (ref 586–1602)
IGM SERPL-MCNC: 1569 MG/DL (ref 26–217)
IGM SERPL-MCNC: 1640 MG/DL (ref 26–217)
IGM SERPL-MCNC: 1747 MG/DL (ref 26–217)
IGM SERPL-MCNC: 2024 MG/DL (ref 26–217)
IMM GRANULOCYTES # BLD AUTO: 0.01 10*3/MM3 (ref 0–0.05)
IMM GRANULOCYTES # BLD AUTO: 0.01 10*3/MM3 (ref 0–0.05)
IMM GRANULOCYTES # BLD AUTO: 0.04 10*3/MM3 (ref 0–0.05)
IMM GRANULOCYTES NFR BLD AUTO: 0.3 % (ref 0–0.5)
IMM GRANULOCYTES NFR BLD AUTO: 0.3 % (ref 0–0.5)
IMM GRANULOCYTES NFR BLD AUTO: 1 % (ref 0–0.5)
INHALED O2 CONCENTRATION: 100 %
INHALED O2 CONCENTRATION: 34 %
INR PPP: 1.33 (ref 0.85–1.16)
INTERPRETATION SERPL IEP-IMP: ABNORMAL
IPAP: 0
IRON 24H UR-MRATE: 32 MCG/DL (ref 37–145)
IRON SATN MFR SERPL: 10 % (ref 20–50)
KAPPA LC SERPL-MCNC: 11.6 MG/L (ref 3.3–19.4)
KAPPA LC/LAMBDA SER: 0.28 {RATIO} (ref 0.26–1.65)
KETONES UR QL STRIP: NEGATIVE
LABORATORY COMMENT REPORT: ABNORMAL
LACTATE HOLD SPECIMEN: NORMAL
LAMBDA LC FREE SERPL-MCNC: 41.4 MG/L (ref 5.7–26.3)
LDH SERPL-CCNC: 234 U/L (ref 135–214)
LDLC SERPL CALC-MCNC: 65 MG/DL (ref 0–100)
LDLC/HDLC SERPL: 1.22 {RATIO}
LEUKOCYTE ESTERASE UR QL STRIP.AUTO: NEGATIVE
LYMPHOCYTES # BLD AUTO: 0.96 10*3/MM3 (ref 0.7–3.1)
LYMPHOCYTES # BLD AUTO: 1.1 10*3/MM3 (ref 0.7–3.1)
LYMPHOCYTES # BLD AUTO: 1.3 10*3/MM3 (ref 0.7–3.1)
LYMPHOCYTES # BLD AUTO: 1.4 10*3/MM3 (ref 0.7–3.1)
LYMPHOCYTES # BLD MANUAL: 0.74 10*3/MM3 (ref 0.7–3.1)
LYMPHOCYTES # BLD MANUAL: 0.87 10*3/MM3 (ref 0.7–3.1)
LYMPHOCYTES # BLD MANUAL: 1.04 10*3/MM3 (ref 0.7–3.1)
LYMPHOCYTES NFR BLD AUTO: 23 % (ref 19.6–45.3)
LYMPHOCYTES NFR BLD AUTO: 28.4 % (ref 19.6–45.3)
LYMPHOCYTES NFR BLD AUTO: 35 % (ref 19.6–45.3)
LYMPHOCYTES NFR BLD AUTO: 35 % (ref 19.6–45.3)
LYMPHOCYTES NFR BLD MANUAL: 17 % (ref 19.6–45.3)
LYMPHOCYTES NFR BLD MANUAL: 27 % (ref 19.6–45.3)
LYMPHOCYTES NFR BLD MANUAL: 32 % (ref 19.6–45.3)
LYMPHOCYTES NFR BLD MANUAL: 4 % (ref 5–12)
LYMPHOCYTES NFR BLD MANUAL: 5 % (ref 5–12)
LYMPHOCYTES NFR BLD MANUAL: 6 % (ref 5–12)
Lab: ABNORMAL
M PNEUMO IGG SER IA-ACNC: NOT DETECTED
M PROTEIN SERPL ELPH-MCNC: 0.7 G/DL
M-SPIKE: 0.7 G/DL
M-SPIKE: 0.7 G/DL
M-SPIKE: 0.8 G/DL
MAGNESIUM SERPL-MCNC: 2.2 MG/DL (ref 1.6–2.4)
MCH RBC QN AUTO: 24.3 PG (ref 26.6–33)
MCH RBC QN AUTO: 24.8 PG (ref 26.6–33)
MCH RBC QN AUTO: 24.8 PG (ref 26.6–33)
MCH RBC QN AUTO: 25 PG (ref 26.6–33)
MCH RBC QN AUTO: 25.2 PG (ref 26.6–33)
MCH RBC QN AUTO: 25.4 PG (ref 26.6–33)
MCH RBC QN AUTO: 26.4 PG (ref 26.6–33)
MCHC RBC AUTO-ENTMCNC: 26.4 G/DL (ref 31.5–35.7)
MCHC RBC AUTO-ENTMCNC: 27.3 G/DL (ref 31.5–35.7)
MCHC RBC AUTO-ENTMCNC: 27.6 G/DL (ref 31.5–35.7)
MCHC RBC AUTO-ENTMCNC: 28 G/DL (ref 31.5–35.7)
MCHC RBC AUTO-ENTMCNC: 28.7 G/DL (ref 31.5–35.7)
MCHC RBC AUTO-ENTMCNC: 30.8 G/DL (ref 31.5–35.7)
MCHC RBC AUTO-ENTMCNC: 31.9 G/DL (ref 31.5–35.7)
MCV RBC AUTO: 80.5 FL (ref 79–97)
MCV RBC AUTO: 83 FL (ref 79–97)
MCV RBC AUTO: 86.8 FL (ref 79–97)
MCV RBC AUTO: 87.2 FL (ref 79–97)
MCV RBC AUTO: 89.8 FL (ref 79–97)
MCV RBC AUTO: 92.1 FL (ref 79–97)
MCV RBC AUTO: 96.4 FL (ref 79–97)
METHGB BLD QL: 0.3 % (ref 0–1.5)
METHGB BLD QL: ABNORMAL
MODALITY: ABNORMAL
MODALITY: ABNORMAL
MONOCYTES # BLD AUTO: 0.16 10*3/MM3 (ref 0.1–0.9)
MONOCYTES # BLD AUTO: 0.17 10*3/MM3 (ref 0.1–0.9)
MONOCYTES # BLD AUTO: 0.19 10*3/MM3 (ref 0.1–0.9)
MONOCYTES # BLD AUTO: 0.22 10*3/MM3 (ref 0.1–0.9)
MONOCYTES # BLD AUTO: 0.22 10*3/MM3 (ref 0.1–0.9)
MONOCYTES # BLD AUTO: 0.3 10*3/MM3 (ref 0.1–0.9)
MONOCYTES # BLD AUTO: 0.3 10*3/MM3 (ref 0.1–0.9)
MONOCYTES NFR BLD AUTO: 5.7 % (ref 5–12)
MONOCYTES NFR BLD AUTO: 5.9 % (ref 5–12)
MONOCYTES NFR BLD AUTO: 6.3 % (ref 5–12)
MONOCYTES NFR BLD AUTO: 7.2 % (ref 5–12)
NEUTROPHILS # BLD AUTO: 1.8 10*3/MM3 (ref 1.7–7)
NEUTROPHILS # BLD AUTO: 1.91 10*3/MM3 (ref 1.7–7)
NEUTROPHILS # BLD AUTO: 2.3 10*3/MM3 (ref 1.7–7)
NEUTROPHILS # BLD AUTO: 2.5 10*3/MM3 (ref 1.7–7)
NEUTROPHILS # BLD AUTO: 3.14 10*3/MM3 (ref 1.7–7)
NEUTROPHILS NFR BLD AUTO: 2.12 10*3/MM3 (ref 1.7–7)
NEUTROPHILS NFR BLD AUTO: 2.85 10*3/MM3 (ref 1.7–7)
NEUTROPHILS NFR BLD AUTO: 57.2 % (ref 42.7–76)
NEUTROPHILS NFR BLD AUTO: 58.7 % (ref 42.7–76)
NEUTROPHILS NFR BLD AUTO: 64.3 % (ref 42.7–76)
NEUTROPHILS NFR BLD AUTO: 68.4 % (ref 42.7–76)
NEUTROPHILS NFR BLD MANUAL: 55 % (ref 42.7–76)
NEUTROPHILS NFR BLD MANUAL: 56 % (ref 42.7–76)
NEUTROPHILS NFR BLD MANUAL: 59 % (ref 42.7–76)
NEUTS BAND NFR BLD MANUAL: 1 % (ref 0–5)
NEUTS BAND NFR BLD MANUAL: 13 % (ref 0–5)
NEUTS BAND NFR BLD MANUAL: 3 % (ref 0–5)
NITRITE UR QL STRIP: NEGATIVE
NOTE: ABNORMAL
NOTE: ABNORMAL
NOTIFIED BY: ABNORMAL
NOTIFIED BY: ABNORMAL
NOTIFIED WHO: ABNORMAL
NOTIFIED WHO: ABNORMAL
NRBC BLD AUTO-RTO: 0 /100 WBC (ref 0–0.2)
NT-PROBNP SERPL-MCNC: ABNORMAL PG/ML (ref 0–1800)
OXYHGB MFR BLDV: 94.4 % (ref 94–99)
OXYHGB MFR BLDV: ABNORMAL %
PAW @ PEAK INSP FLOW SETTING VENT: 0 CMH2O
PCO2 BLDA: 41.2 MM HG (ref 35–45)
PCO2 BLDA: 96.8 MM HG (ref 35–45)
PCO2 TEMP ADJ BLD: 41.2 MM HG (ref 35–45)
PCO2 TEMP ADJ BLD: 96.8 MM HG (ref 35–45)
PEEP RESPIRATORY: 5 CM[H2O]
PH BLDA: 7.22 PH UNITS (ref 7.35–7.45)
PH BLDA: 7.59 PH UNITS (ref 7.35–7.45)
PH UR STRIP.AUTO: 6.5 [PH] (ref 5–8)
PH UR STRIP.AUTO: 7 [PH] (ref 5–8)
PH UR STRIP.AUTO: 7 [PH] (ref 5–8)
PH, TEMP CORRECTED: 7.22 PH UNITS
PH, TEMP CORRECTED: 7.59 PH UNITS
PHOSPHATE SERPL-MCNC: 4 MG/DL (ref 2.5–4.5)
PHOSPHATE SERPL-MCNC: 5.2 MG/DL (ref 2.5–4.5)
PHOSPHATE SERPL-MCNC: 5.9 MG/DL (ref 2.5–4.5)
PLAT MORPH BLD: NORMAL
PLATELET # BLD AUTO: 108 10*3/MM3 (ref 140–450)
PLATELET # BLD AUTO: 108 10*3/MM3 (ref 140–450)
PLATELET # BLD AUTO: 109 10*3/MM3 (ref 140–450)
PLATELET # BLD AUTO: 133 10*3/MM3 (ref 140–450)
PLATELET # BLD AUTO: 136 10*3/MM3 (ref 140–450)
PLATELET # BLD AUTO: 90 10*3/MM3 (ref 140–450)
PLATELET # BLD AUTO: 99 10*3/MM3 (ref 140–450)
PMV BLD AUTO: 10.3 FL (ref 6–12)
PMV BLD AUTO: 11.1 FL (ref 6–12)
PMV BLD AUTO: 11.1 FL (ref 6–12)
PMV BLD AUTO: 11.4 FL (ref 6–12)
PMV BLD AUTO: 11.7 FL (ref 6–12)
PMV BLD AUTO: 7.5 FL (ref 6–12)
PMV BLD AUTO: 9.9 FL (ref 6–12)
PO2 BLDA: 396 MM HG (ref 83–108)
PO2 BLDA: 79.6 MM HG (ref 83–108)
PO2 TEMP ADJ BLD: 396 MM HG (ref 83–108)
PO2 TEMP ADJ BLD: 79.6 MM HG (ref 83–108)
POLYCHROMASIA BLD QL SMEAR: ABNORMAL
POLYCHROMASIA BLD QL SMEAR: NORMAL
POTASSIUM BLD-SCNC: 4.2 MMOL/L (ref 3.5–5.2)
POTASSIUM BLD-SCNC: 4.4 MMOL/L (ref 3.5–5.2)
POTASSIUM BLD-SCNC: 4.4 MMOL/L (ref 3.5–5.2)
POTASSIUM SERPL-SCNC: 4 MMOL/L (ref 3.5–5.2)
POTASSIUM SERPL-SCNC: 4.1 MMOL/L (ref 3.5–5.2)
POTASSIUM SERPL-SCNC: 4.4 MMOL/L (ref 3.5–5.2)
POTASSIUM SERPL-SCNC: 4.5 MMOL/L (ref 3.5–5.2)
POTASSIUM SERPL-SCNC: 4.7 MMOL/L (ref 3.5–5.2)
POTASSIUM SERPL-SCNC: 5 MMOL/L (ref 3.5–5.2)
POTASSIUM SERPL-SCNC: 5.1 MMOL/L (ref 3.5–5.2)
PROCALCITONIN SERPL-MCNC: 0.23 NG/ML (ref 0–0.25)
PROT SERPL-MCNC: 6 G/DL (ref 6–8.5)
PROT SERPL-MCNC: 6.1 G/DL (ref 6–8.5)
PROT SERPL-MCNC: 6.2 G/DL (ref 6–8.5)
PROT SERPL-MCNC: 6.2 G/DL (ref 6–8.5)
PROT SERPL-MCNC: 6.3 G/DL (ref 6–8.5)
PROT SERPL-MCNC: 6.3 G/DL (ref 6–8.5)
PROT SERPL-MCNC: 6.5 G/DL (ref 6–8.5)
PROT SERPL-MCNC: 6.7 G/DL (ref 6–8.5)
PROT SERPL-MCNC: 6.7 G/DL (ref 6–8.5)
PROT SERPL-MCNC: 6.8 G/DL (ref 6–8.5)
PROT UR QL STRIP: ABNORMAL
PROT UR-MCNC: 115 MG/DL
PROT UR-MCNC: 9 MG/DL
PROT/CREAT UR: 276.9 MG/G CREA (ref 0–200)
PROTHROMBIN TIME: 16.1 SECONDS (ref 11.5–14)
PTH-INTACT SERPL-MCNC: 81.7 PG/ML (ref 15–65)
QT INTERVAL: 452 MS
QTC INTERVAL: 447 MS
RBC # BLD AUTO: 3.07 10*6/MM3 (ref 3.77–5.28)
RBC # BLD AUTO: 3.15 10*6/MM3 (ref 3.77–5.28)
RBC # BLD AUTO: 3.3 10*6/MM3 (ref 3.77–5.28)
RBC # BLD AUTO: 3.59 10*6/MM3 (ref 3.77–5.28)
RBC # BLD AUTO: 3.6 10*6/MM3 (ref 3.77–5.28)
RBC # BLD AUTO: 3.6 10*6/MM3 (ref 3.77–5.28)
RBC # BLD AUTO: 3.87 10*6/MM3 (ref 3.77–5.28)
RBC # UR: NORMAL /HPF
RBC MORPH BLD: NORMAL
REF LAB TEST METHOD: NORMAL
RH BLD: POSITIVE
RH BLD: POSITIVE
RHINOVIRUS RNA SPEC NAA+PROBE: NOT DETECTED
RSV RNA NPH QL NAA+NON-PROBE: NOT DETECTED
SARS-COV-2 RNA NPH QL NAA+NON-PROBE: NOT DETECTED
SCAN SLIDE: NORMAL
SCHISTOCYTES BLD QL SMEAR: NORMAL
SET MECH RESP RATE: 16
SMUDGE CELLS BLD QL SMEAR: ABNORMAL
SMUDGE CELLS BLD QL SMEAR: ABNORMAL
SODIUM BLD-SCNC: 136 MMOL/L (ref 136–145)
SODIUM BLD-SCNC: 141 MMOL/L (ref 136–145)
SODIUM BLD-SCNC: 142 MMOL/L (ref 136–145)
SODIUM SERPL-SCNC: 140 MMOL/L (ref 136–145)
SODIUM SERPL-SCNC: 141 MMOL/L (ref 136–145)
SODIUM SERPL-SCNC: 143 MMOL/L (ref 136–145)
SODIUM SERPL-SCNC: 144 MMOL/L (ref 136–145)
SODIUM SERPL-SCNC: 145 MMOL/L (ref 136–145)
SP GR UR STRIP: 1.01 (ref 1–1.03)
SP GR UR STRIP: 1.01 (ref 1–1.03)
SP GR UR STRIP: 1.02 (ref 1–1.03)
SQUAMOUS #/AREA URNS HPF: NORMAL /HPF
STOMATOCYTES BLD QL SMEAR: ABNORMAL
STOMATOCYTES BLD QL SMEAR: NORMAL
T&S EXPIRATION DATE: NORMAL
TIBC SERPL-MCNC: 335 MCG/DL (ref 298–536)
TOTAL RATE: 0 BREATHS/MINUTE
TRANSFERRIN SERPL-MCNC: 225 MG/DL (ref 200–360)
TRIGL SERPL-MCNC: 52 MG/DL (ref 0–150)
TROPONIN T SERPL-MCNC: 0.03 NG/ML (ref 0–0.03)
TROPONIN T SERPL-MCNC: 0.14 NG/ML (ref 0–0.03)
TROPONIN T SERPL-MCNC: 0.16 NG/ML (ref 0–0.03)
TROPONIN T SERPL-MCNC: 0.17 NG/ML (ref 0–0.03)
TSH SERPL DL<=0.05 MIU/L-ACNC: 6.12 UIU/ML (ref 0.27–4.2)
UROBILINOGEN UR QL STRIP: ABNORMAL
VARIANT LYMPHS NFR BLD MANUAL: 11 % (ref 0–5)
VARIANT LYMPHS NFR BLD MANUAL: 3 % (ref 0–5)
VARIANT LYMPHS NFR BLD MANUAL: 6 % (ref 0–5)
VENTILATOR MODE: ABNORMAL
VISC SER: 1.7 REL.SALINE (ref 1.4–2.1)
VLDLC SERPL-MCNC: 10.4 MG/DL (ref 5–40)
VT ON VENT VENT: 400 ML
WBC # BLD AUTO: 3.22 10*3/MM3 (ref 3.4–10.8)
WBC # BLD AUTO: 3.24 10*3/MM3 (ref 3.4–10.8)
WBC # BLD AUTO: 3.71 10*3/MM3 (ref 3.4–10.8)
WBC # BLD AUTO: 4.17 10*3/MM3 (ref 3.4–10.8)
WBC # BLD AUTO: 4.36 10*3/MM3 (ref 3.4–10.8)
WBC MORPH BLD: NORMAL
WBC MORPH BLD: NORMAL
WBC NRBC COR # BLD: 3.88 10*3/MM3 (ref 3.4–10.8)
WBC NRBC COR # BLD: 4 10*3/MM3 (ref 3.4–10.8)
WBC UR QL AUTO: NORMAL /HPF

## 2020-01-01 PROCEDURE — 82570 ASSAY OF URINE CREATININE: CPT

## 2020-01-01 PROCEDURE — 85810 BLOOD VISCOSITY EXAMINATION: CPT

## 2020-01-01 PROCEDURE — 81001 URINALYSIS AUTO W/SCOPE: CPT

## 2020-01-01 PROCEDURE — 80053 COMPREHEN METABOLIC PANEL: CPT

## 2020-01-01 PROCEDURE — 83540 ASSAY OF IRON: CPT | Performed by: FAMILY MEDICINE

## 2020-01-01 PROCEDURE — 36415 COLL VENOUS BLD VENIPUNCTURE: CPT

## 2020-01-01 PROCEDURE — 73502 X-RAY EXAM HIP UNI 2-3 VIEWS: CPT

## 2020-01-01 PROCEDURE — 99213 OFFICE O/P EST LOW 20 MIN: CPT | Performed by: FAMILY MEDICINE

## 2020-01-01 PROCEDURE — 94799 UNLISTED PULMONARY SVC/PX: CPT

## 2020-01-01 PROCEDURE — 25010000002 FUROSEMIDE PER 20 MG: Performed by: FAMILY MEDICINE

## 2020-01-01 PROCEDURE — 82784 ASSAY IGA/IGD/IGG/IGM EACH: CPT

## 2020-01-01 PROCEDURE — P9612 CATHETERIZE FOR URINE SPEC: HCPCS

## 2020-01-01 PROCEDURE — 25010000002 GLUCAGON (HUMAN RECOMBINANT) 1 MG RECONSTITUTED SOLUTION: Performed by: EMERGENCY MEDICINE

## 2020-01-01 PROCEDURE — 85025 COMPLETE CBC W/AUTO DIFF WBC: CPT

## 2020-01-01 PROCEDURE — 25010000002 MORPHINE PER 10 MG: Performed by: NURSE PRACTITIONER

## 2020-01-01 PROCEDURE — 84155 ASSAY OF PROTEIN SERUM: CPT

## 2020-01-01 PROCEDURE — 84145 PROCALCITONIN (PCT): CPT | Performed by: EMERGENCY MEDICINE

## 2020-01-01 PROCEDURE — 84165 PROTEIN E-PHORESIS SERUM: CPT

## 2020-01-01 PROCEDURE — 80053 COMPREHEN METABOLIC PANEL: CPT | Performed by: FAMILY MEDICINE

## 2020-01-01 PROCEDURE — 70450 CT HEAD/BRAIN W/O DYE: CPT

## 2020-01-01 PROCEDURE — 85025 COMPLETE CBC W/AUTO DIFF WBC: CPT | Performed by: FAMILY MEDICINE

## 2020-01-01 PROCEDURE — 84484 ASSAY OF TROPONIN QUANT: CPT | Performed by: EMERGENCY MEDICINE

## 2020-01-01 PROCEDURE — 0202U NFCT DS 22 TRGT SARS-COV-2: CPT | Performed by: EMERGENCY MEDICINE

## 2020-01-01 PROCEDURE — 93005 ELECTROCARDIOGRAM TRACING: CPT

## 2020-01-01 PROCEDURE — 80053 COMPREHEN METABOLIC PANEL: CPT | Performed by: EMERGENCY MEDICINE

## 2020-01-01 PROCEDURE — 80061 LIPID PANEL: CPT

## 2020-01-01 PROCEDURE — 84484 ASSAY OF TROPONIN QUANT: CPT | Performed by: FAMILY MEDICINE

## 2020-01-01 PROCEDURE — 83735 ASSAY OF MAGNESIUM: CPT | Performed by: FAMILY MEDICINE

## 2020-01-01 PROCEDURE — 36600 WITHDRAWAL OF ARTERIAL BLOOD: CPT

## 2020-01-01 PROCEDURE — 99442 PR PHYS/QHP TELEPHONE EVALUATION 11-20 MIN: CPT | Performed by: INTERNAL MEDICINE

## 2020-01-01 PROCEDURE — 36415 COLL VENOUS BLD VENIPUNCTURE: CPT | Performed by: FAMILY MEDICINE

## 2020-01-01 PROCEDURE — 93005 ELECTROCARDIOGRAM TRACING: CPT | Performed by: FAMILY MEDICINE

## 2020-01-01 PROCEDURE — 71046 X-RAY EXAM CHEST 2 VIEWS: CPT

## 2020-01-01 PROCEDURE — 84156 ASSAY OF PROTEIN URINE: CPT

## 2020-01-01 PROCEDURE — 86334 IMMUNOFIX E-PHORESIS SERUM: CPT

## 2020-01-01 PROCEDURE — 31500 INSERT EMERGENCY AIRWAY: CPT

## 2020-01-01 PROCEDURE — 83615 LACTATE (LD) (LDH) ENZYME: CPT

## 2020-01-01 PROCEDURE — 94770: CPT

## 2020-01-01 PROCEDURE — 83605 ASSAY OF LACTIC ACID: CPT | Performed by: EMERGENCY MEDICINE

## 2020-01-01 PROCEDURE — 93010 ELECTROCARDIOGRAM REPORT: CPT | Performed by: INTERNAL MEDICINE

## 2020-01-01 PROCEDURE — 86901 BLOOD TYPING SEROLOGIC RH(D): CPT | Performed by: FAMILY MEDICINE

## 2020-01-01 PROCEDURE — 80048 BASIC METABOLIC PNL TOTAL CA: CPT | Performed by: FAMILY MEDICINE

## 2020-01-01 PROCEDURE — 86901 BLOOD TYPING SEROLOGIC RH(D): CPT

## 2020-01-01 PROCEDURE — A9540 TC99M MAA: HCPCS | Performed by: EMERGENCY MEDICINE

## 2020-01-01 PROCEDURE — 99291 CRITICAL CARE FIRST HOUR: CPT | Performed by: FAMILY MEDICINE

## 2020-01-01 PROCEDURE — 82805 BLOOD GASES W/O2 SATURATION: CPT

## 2020-01-01 PROCEDURE — 99213 OFFICE O/P EST LOW 20 MIN: CPT | Performed by: INTERNAL MEDICINE

## 2020-01-01 PROCEDURE — 83883 ASSAY NEPHELOMETRY NOT SPEC: CPT

## 2020-01-01 PROCEDURE — 99443 PR PHYS/QHP TELEPHONE EVALUATION 21-30 MIN: CPT | Performed by: INTERNAL MEDICINE

## 2020-01-01 PROCEDURE — 99213 OFFICE O/P EST LOW 20 MIN: CPT | Performed by: NURSE PRACTITIONER

## 2020-01-01 PROCEDURE — 84100 ASSAY OF PHOSPHORUS: CPT

## 2020-01-01 PROCEDURE — 80069 RENAL FUNCTION PANEL: CPT

## 2020-01-01 PROCEDURE — 87040 BLOOD CULTURE FOR BACTERIA: CPT | Performed by: EMERGENCY MEDICINE

## 2020-01-01 PROCEDURE — 0BH17EZ INSERTION OF ENDOTRACHEAL AIRWAY INTO TRACHEA, VIA NATURAL OR ARTIFICIAL OPENING: ICD-10-PCS | Performed by: FAMILY MEDICINE

## 2020-01-01 PROCEDURE — 93005 ELECTROCARDIOGRAM TRACING: CPT | Performed by: EMERGENCY MEDICINE

## 2020-01-01 PROCEDURE — 25010000002 PROPOFOL 10 MG/ML EMULSION: Performed by: EMERGENCY MEDICINE

## 2020-01-01 PROCEDURE — 99232 SBSQ HOSP IP/OBS MODERATE 35: CPT | Performed by: INTERNAL MEDICINE

## 2020-01-01 PROCEDURE — 99233 SBSQ HOSP IP/OBS HIGH 50: CPT | Performed by: FAMILY MEDICINE

## 2020-01-01 PROCEDURE — 82962 GLUCOSE BLOOD TEST: CPT

## 2020-01-01 PROCEDURE — 25010000002 LORAZEPAM PER 2 MG: Performed by: NURSE PRACTITIONER

## 2020-01-01 PROCEDURE — 82306 VITAMIN D 25 HYDROXY: CPT

## 2020-01-01 PROCEDURE — 25010000002 LORAZEPAM PER 2 MG: Performed by: FAMILY MEDICINE

## 2020-01-01 PROCEDURE — 83880 ASSAY OF NATRIURETIC PEPTIDE: CPT | Performed by: FAMILY MEDICINE

## 2020-01-01 PROCEDURE — 85007 BL SMEAR W/DIFF WBC COUNT: CPT

## 2020-01-01 PROCEDURE — 83970 ASSAY OF PARATHORMONE: CPT

## 2020-01-01 PROCEDURE — 85025 COMPLETE CBC W/AUTO DIFF WBC: CPT | Performed by: EMERGENCY MEDICINE

## 2020-01-01 PROCEDURE — 99291 CRITICAL CARE FIRST HOUR: CPT

## 2020-01-01 PROCEDURE — 85007 BL SMEAR W/DIFF WBC COUNT: CPT | Performed by: EMERGENCY MEDICINE

## 2020-01-01 PROCEDURE — 86900 BLOOD TYPING SEROLOGIC ABO: CPT

## 2020-01-01 PROCEDURE — 0 TECHNETIUM ALBUMIN AGGREGATED: Performed by: EMERGENCY MEDICINE

## 2020-01-01 PROCEDURE — 92610 EVALUATE SWALLOWING FUNCTION: CPT

## 2020-01-01 PROCEDURE — 99214 OFFICE O/P EST MOD 30 MIN: CPT | Performed by: FAMILY MEDICINE

## 2020-01-01 PROCEDURE — 99443 PR PHYS/QHP TELEPHONE EVALUATION 21-30 MIN: CPT | Performed by: FAMILY MEDICINE

## 2020-01-01 PROCEDURE — 99223 1ST HOSP IP/OBS HIGH 75: CPT | Performed by: FAMILY MEDICINE

## 2020-01-01 PROCEDURE — 25010000002 MORPHINE PER 10 MG: Performed by: FAMILY MEDICINE

## 2020-01-01 PROCEDURE — 84484 ASSAY OF TROPONIN QUANT: CPT | Performed by: NURSE PRACTITIONER

## 2020-01-01 PROCEDURE — 85730 THROMBOPLASTIN TIME PARTIAL: CPT | Performed by: FAMILY MEDICINE

## 2020-01-01 PROCEDURE — 84466 ASSAY OF TRANSFERRIN: CPT | Performed by: FAMILY MEDICINE

## 2020-01-01 PROCEDURE — 94660 CPAP INITIATION&MGMT: CPT

## 2020-01-01 PROCEDURE — 84100 ASSAY OF PHOSPHORUS: CPT | Performed by: FAMILY MEDICINE

## 2020-01-01 PROCEDURE — 82728 ASSAY OF FERRITIN: CPT | Performed by: FAMILY MEDICINE

## 2020-01-01 PROCEDURE — 5A1935Z RESPIRATORY VENTILATION, LESS THAN 24 CONSECUTIVE HOURS: ICD-10-PCS | Performed by: FAMILY MEDICINE

## 2020-01-01 PROCEDURE — 78580 LUNG PERFUSION IMAGING: CPT

## 2020-01-01 PROCEDURE — 86850 RBC ANTIBODY SCREEN: CPT | Performed by: FAMILY MEDICINE

## 2020-01-01 PROCEDURE — 85007 BL SMEAR W/DIFF WBC COUNT: CPT | Performed by: FAMILY MEDICINE

## 2020-01-01 PROCEDURE — 85610 PROTHROMBIN TIME: CPT | Performed by: FAMILY MEDICINE

## 2020-01-01 PROCEDURE — 86900 BLOOD TYPING SEROLOGIC ABO: CPT | Performed by: FAMILY MEDICINE

## 2020-01-01 PROCEDURE — 25010000002 ENOXAPARIN PER 10 MG: Performed by: FAMILY MEDICINE

## 2020-01-01 PROCEDURE — 81001 URINALYSIS AUTO W/SCOPE: CPT | Performed by: EMERGENCY MEDICINE

## 2020-01-01 PROCEDURE — 94002 VENT MGMT INPAT INIT DAY: CPT

## 2020-01-01 PROCEDURE — 84443 ASSAY THYROID STIM HORMONE: CPT

## 2020-01-01 PROCEDURE — 71045 X-RAY EXAM CHEST 1 VIEW: CPT

## 2020-01-01 RX ORDER — MORPHINE SULFATE 2 MG/ML
1 INJECTION, SOLUTION INTRAMUSCULAR; INTRAVENOUS ONCE
Status: DISCONTINUED | OUTPATIENT
Start: 2020-01-01 | End: 2020-01-01 | Stop reason: HOSPADM

## 2020-01-01 RX ORDER — FUROSEMIDE 40 MG/1
40 TABLET ORAL EVERY MORNING
Qty: 90 TABLET | Refills: 1 | Status: SHIPPED | OUTPATIENT
Start: 2020-01-01 | End: 2020-01-01

## 2020-01-01 RX ORDER — DOXAZOSIN MESYLATE 1 MG/1
TABLET ORAL
Qty: 90 TABLET | Refills: 0 | Status: SHIPPED | OUTPATIENT
Start: 2020-01-01 | End: 2020-01-01

## 2020-01-01 RX ORDER — IPRATROPIUM BROMIDE 42 UG/1
SPRAY, METERED NASAL
Qty: 15 ML | Refills: 0 | Status: SHIPPED | OUTPATIENT
Start: 2020-01-01 | End: 2020-01-01

## 2020-01-01 RX ORDER — LORAZEPAM 2 MG/ML
1 INJECTION INTRAMUSCULAR EVERY 4 HOURS PRN
Status: DISCONTINUED | OUTPATIENT
Start: 2020-01-01 | End: 2020-01-01

## 2020-01-01 RX ORDER — BENZONATATE 100 MG/1
100 CAPSULE ORAL 3 TIMES DAILY PRN
Status: DISCONTINUED | OUTPATIENT
Start: 2020-01-01 | End: 2020-01-01 | Stop reason: HOSPADM

## 2020-01-01 RX ORDER — IPRATROPIUM BROMIDE 42 UG/1
2 SPRAY, METERED NASAL 3 TIMES DAILY
Qty: 15 ML | Refills: 2 | Status: SHIPPED | OUTPATIENT
Start: 2020-01-01 | End: 2020-01-01

## 2020-01-01 RX ORDER — DOXAZOSIN MESYLATE 1 MG/1
1 TABLET ORAL NIGHTLY
Qty: 90 TABLET | Refills: 1 | Status: SHIPPED | OUTPATIENT
Start: 2020-01-01 | End: 2020-01-01

## 2020-01-01 RX ORDER — SODIUM CHLORIDE 0.9 % (FLUSH) 0.9 %
10 SYRINGE (ML) INJECTION AS NEEDED
Status: DISCONTINUED | OUTPATIENT
Start: 2020-01-01 | End: 2020-01-01 | Stop reason: HOSPADM

## 2020-01-01 RX ORDER — POTASSIUM CHLORIDE 600 MG/1
8 CAPSULE, EXTENDED RELEASE ORAL DAILY
Qty: 90 CAPSULE | Refills: 1 | Status: SHIPPED | OUTPATIENT
Start: 2020-01-01 | End: 2020-01-01

## 2020-01-01 RX ORDER — FUROSEMIDE 10 MG/ML
40 INJECTION INTRAMUSCULAR; INTRAVENOUS EVERY 12 HOURS
Status: DISCONTINUED | OUTPATIENT
Start: 2020-01-01 | End: 2020-01-01

## 2020-01-01 RX ORDER — MONTELUKAST SODIUM 10 MG/1
10 TABLET ORAL DAILY
Status: DISCONTINUED | OUTPATIENT
Start: 2020-01-01 | End: 2020-01-01

## 2020-01-01 RX ORDER — ALBUTEROL SULFATE 2.5 MG/3ML
2.5 SOLUTION RESPIRATORY (INHALATION) EVERY 6 HOURS PRN
Status: DISCONTINUED | OUTPATIENT
Start: 2020-01-01 | End: 2020-01-01 | Stop reason: HOSPADM

## 2020-01-01 RX ORDER — ACETAMINOPHEN 325 MG/1
650 TABLET ORAL EVERY 6 HOURS PRN
Status: DISCONTINUED | OUTPATIENT
Start: 2020-01-01 | End: 2020-01-01 | Stop reason: HOSPADM

## 2020-01-01 RX ORDER — LEVOTHYROXINE SODIUM 0.05 MG/1
50 TABLET ORAL DAILY
Qty: 90 TABLET | Refills: 0 | Status: SHIPPED | OUTPATIENT
Start: 2020-01-01

## 2020-01-01 RX ORDER — ESTRADIOL 0.1 MG/G
CREAM VAGINAL
Qty: 42.5 G | Refills: 0 | Status: SHIPPED | OUTPATIENT
Start: 2020-01-01 | End: 2020-01-01

## 2020-01-01 RX ORDER — ACETAMINOPHEN 500 MG
1000 TABLET ORAL EVERY 8 HOURS
Status: DISCONTINUED | OUTPATIENT
Start: 2020-01-01 | End: 2020-01-01

## 2020-01-01 RX ORDER — ATENOLOL 100 MG/1
TABLET ORAL
Qty: 90 TABLET | Refills: 1 | Status: SHIPPED | OUTPATIENT
Start: 2020-01-01 | End: 2020-01-01

## 2020-01-01 RX ORDER — ATENOLOL 50 MG/1
50 TABLET ORAL
Status: DISCONTINUED | OUTPATIENT
Start: 2020-01-01 | End: 2020-01-01

## 2020-01-01 RX ORDER — CALCIUM CARBONATE 750 MG/1
1500 TABLET, CHEWABLE ORAL 2 TIMES DAILY PRN
Status: DISCONTINUED | OUTPATIENT
Start: 2020-01-01 | End: 2020-01-01 | Stop reason: HOSPADM

## 2020-01-01 RX ORDER — ATENOLOL 100 MG/1
TABLET ORAL
Qty: 90 TABLET | Refills: 0 | Status: SHIPPED | OUTPATIENT
Start: 2020-01-01

## 2020-01-01 RX ORDER — NALOXONE HCL 0.4 MG/ML
0.4 VIAL (ML) INJECTION
Status: DISCONTINUED | OUTPATIENT
Start: 2020-01-01 | End: 2020-01-01 | Stop reason: HOSPADM

## 2020-01-01 RX ORDER — SODIUM CHLORIDE 0.9 % (FLUSH) 0.9 %
10 SYRINGE (ML) INJECTION EVERY 12 HOURS SCHEDULED
Status: DISCONTINUED | OUTPATIENT
Start: 2020-01-01 | End: 2020-01-01 | Stop reason: HOSPADM

## 2020-01-01 RX ORDER — LORAZEPAM 2 MG/ML
0.25 INJECTION INTRAMUSCULAR EVERY 4 HOURS PRN
Status: DISCONTINUED | OUTPATIENT
Start: 2020-01-01 | End: 2020-01-01

## 2020-01-01 RX ORDER — FUROSEMIDE 10 MG/ML
40 INJECTION INTRAMUSCULAR; INTRAVENOUS ONCE
Status: COMPLETED | OUTPATIENT
Start: 2020-01-01 | End: 2020-01-01

## 2020-01-01 RX ORDER — LOSARTAN POTASSIUM 100 MG/1
TABLET ORAL
Qty: 90 TABLET | Refills: 0 | Status: SHIPPED | OUTPATIENT
Start: 2020-01-01

## 2020-01-01 RX ORDER — POTASSIUM CHLORIDE 600 MG/1
CAPSULE, EXTENDED RELEASE ORAL
Qty: 90 CAPSULE | Refills: 0 | Status: SHIPPED | OUTPATIENT
Start: 2020-01-01

## 2020-01-01 RX ORDER — ATROPINE SULFATE 1 MG/ML
INJECTION, SOLUTION INTRAMUSCULAR; INTRAVENOUS; SUBCUTANEOUS
Status: DISCONTINUED
Start: 2020-01-01 | End: 2020-01-01 | Stop reason: WASHOUT

## 2020-01-01 RX ORDER — LEVOTHYROXINE SODIUM 0.05 MG/1
50 TABLET ORAL DAILY
Status: DISCONTINUED | OUTPATIENT
Start: 2020-01-01 | End: 2020-01-01

## 2020-01-01 RX ORDER — LOSARTAN POTASSIUM 100 MG/1
TABLET ORAL
Qty: 90 TABLET | Refills: 0 | Status: SHIPPED | OUTPATIENT
Start: 2020-01-01 | End: 2020-01-01

## 2020-01-01 RX ORDER — AMLODIPINE BESYLATE 5 MG/1
5 TABLET ORAL DAILY
Qty: 90 TABLET | Refills: 1 | Status: SHIPPED | OUTPATIENT
Start: 2020-01-01 | End: 2020-01-01

## 2020-01-01 RX ORDER — FUROSEMIDE 10 MG/ML
40 INJECTION INTRAMUSCULAR; INTRAVENOUS EVERY 12 HOURS
Status: DISCONTINUED | OUTPATIENT
Start: 2020-01-01 | End: 2020-01-01 | Stop reason: HOSPADM

## 2020-01-01 RX ORDER — ETOMIDATE 2 MG/ML
20 INJECTION INTRAVENOUS ONCE
Status: COMPLETED | OUTPATIENT
Start: 2020-01-01 | End: 2020-01-01

## 2020-01-01 RX ORDER — DOXAZOSIN MESYLATE 1 MG/1
1 TABLET ORAL NIGHTLY
Qty: 30 TABLET | Refills: 1 | Status: SHIPPED | OUTPATIENT
Start: 2020-01-01 | End: 2020-01-01

## 2020-01-01 RX ORDER — ONDANSETRON 2 MG/ML
4 INJECTION INTRAMUSCULAR; INTRAVENOUS EVERY 6 HOURS PRN
Status: DISCONTINUED | OUTPATIENT
Start: 2020-01-01 | End: 2020-01-01 | Stop reason: HOSPADM

## 2020-01-01 RX ORDER — IPRATROPIUM BROMIDE 42 UG/1
SPRAY, METERED NASAL
Qty: 15 ML | Refills: 2 | Status: SHIPPED | OUTPATIENT
Start: 2020-01-01

## 2020-01-01 RX ORDER — DOXYCYCLINE HYCLATE 100 MG
100 TABLET ORAL 2 TIMES DAILY
Qty: 14 TABLET | Refills: 0 | Status: SHIPPED | OUTPATIENT
Start: 2020-01-01 | End: 2020-01-01

## 2020-01-01 RX ORDER — NALOXONE HCL 0.4 MG/ML
0.4 VIAL (ML) INJECTION
Status: DISCONTINUED | OUTPATIENT
Start: 2020-01-01 | End: 2020-01-01

## 2020-01-01 RX ORDER — ALBUTEROL SULFATE 2.5 MG/3ML
2.5 SOLUTION RESPIRATORY (INHALATION) ONCE
Status: DISCONTINUED | OUTPATIENT
Start: 2020-01-01 | End: 2020-01-01

## 2020-01-01 RX ORDER — MORPHINE SULFATE 2 MG/ML
1 INJECTION, SOLUTION INTRAMUSCULAR; INTRAVENOUS EVERY 4 HOURS PRN
Status: DISCONTINUED | OUTPATIENT
Start: 2020-01-01 | End: 2020-01-01

## 2020-01-01 RX ORDER — FUROSEMIDE 40 MG/1
TABLET ORAL
Qty: 90 TABLET | Refills: 0 | Status: SHIPPED | OUTPATIENT
Start: 2020-01-01

## 2020-01-01 RX ORDER — ALBUTEROL SULFATE 2.5 MG/3ML
SOLUTION RESPIRATORY (INHALATION)
Qty: 225 ML | Refills: 0 | Status: SHIPPED | OUTPATIENT
Start: 2020-01-01

## 2020-01-01 RX ORDER — POTASSIUM CHLORIDE 600 MG/1
CAPSULE, EXTENDED RELEASE ORAL
Qty: 90 CAPSULE | Refills: 0 | Status: SHIPPED | OUTPATIENT
Start: 2020-01-01 | End: 2020-01-01

## 2020-01-01 RX ORDER — VECURONIUM BROMIDE 1 MG/ML
7 INJECTION, POWDER, LYOPHILIZED, FOR SOLUTION INTRAVENOUS ONCE
Status: COMPLETED | OUTPATIENT
Start: 2020-01-01 | End: 2020-01-01

## 2020-01-01 RX ORDER — DOCUSATE SODIUM 100 MG/1
100 CAPSULE, LIQUID FILLED ORAL 2 TIMES DAILY
Status: DISCONTINUED | OUTPATIENT
Start: 2020-01-01 | End: 2020-01-01

## 2020-01-01 RX ORDER — LORAZEPAM 2 MG/ML
0.5 INJECTION INTRAMUSCULAR EVERY 4 HOURS PRN
Status: DISCONTINUED | OUTPATIENT
Start: 2020-01-01 | End: 2020-01-01 | Stop reason: HOSPADM

## 2020-01-01 RX ORDER — BENZONATATE 100 MG/1
CAPSULE ORAL
COMMUNITY
Start: 2019-11-01 | End: 2020-01-01 | Stop reason: SDUPTHER

## 2020-01-01 RX ORDER — ALBUTEROL SULFATE 90 UG/1
AEROSOL, METERED RESPIRATORY (INHALATION)
Qty: 18 G | Refills: 2 | Status: SHIPPED | OUTPATIENT
Start: 2020-01-01

## 2020-01-01 RX ORDER — MORPHINE SULFATE 2 MG/ML
1 INJECTION, SOLUTION INTRAMUSCULAR; INTRAVENOUS
Status: DISCONTINUED | OUTPATIENT
Start: 2020-01-01 | End: 2020-01-01 | Stop reason: HOSPADM

## 2020-01-01 RX ORDER — BENZONATATE 100 MG/1
100 CAPSULE ORAL 3 TIMES DAILY PRN
Qty: 50 CAPSULE | Refills: 1 | Status: SHIPPED | OUTPATIENT
Start: 2020-01-01

## 2020-01-01 RX ORDER — HYDROCODONE BITARTRATE AND ACETAMINOPHEN 5; 325 MG/1; MG/1
1 TABLET ORAL EVERY 6 HOURS PRN
Status: DISCONTINUED | OUTPATIENT
Start: 2020-01-01 | End: 2020-01-01 | Stop reason: HOSPADM

## 2020-01-01 RX ADMIN — KIT FOR THE PREPARATION OF TECHNETIUM TC 99M ALBUMIN AGGREGATED 1 DOSE: 2.5 INJECTION, POWDER, FOR SOLUTION INTRAVENOUS at 13:22

## 2020-01-01 RX ADMIN — ETOMIDATE 20 MG: 2 INJECTION, SOLUTION INTRAVENOUS at 06:21

## 2020-01-01 RX ADMIN — BENZONATATE 100 MG: 100 CAPSULE ORAL at 07:12

## 2020-01-01 RX ADMIN — MORPHINE SULFATE 1 MG: 2 INJECTION, SOLUTION INTRAMUSCULAR; INTRAVENOUS at 00:08

## 2020-01-01 RX ADMIN — MONTELUKAST SODIUM 10 MG: 10 TABLET, COATED ORAL at 18:35

## 2020-01-01 RX ADMIN — ACETAMINOPHEN 1000 MG: 500 TABLET, FILM COATED ORAL at 18:20

## 2020-01-01 RX ADMIN — MINERAL OIL: 1000 LIQUID ORAL at 22:41

## 2020-01-01 RX ADMIN — SODIUM CHLORIDE, PRESERVATIVE FREE 10 ML: 5 INJECTION INTRAVENOUS at 20:55

## 2020-01-01 RX ADMIN — LORAZEPAM 0.5 MG: 2 INJECTION INTRAMUSCULAR; INTRAVENOUS at 16:16

## 2020-01-01 RX ADMIN — LORAZEPAM 0.5 MG: 2 INJECTION INTRAMUSCULAR; INTRAVENOUS at 09:48

## 2020-01-01 RX ADMIN — BENZONATATE 100 MG: 100 CAPSULE ORAL at 20:54

## 2020-01-01 RX ADMIN — GLUCAGON HYDROCHLORIDE 1 MG: 1 INJECTION, POWDER, FOR SOLUTION INTRAMUSCULAR; INTRAVENOUS; SUBCUTANEOUS at 06:31

## 2020-01-01 RX ADMIN — ENOXAPARIN SODIUM 30 MG: 30 INJECTION SUBCUTANEOUS at 20:54

## 2020-01-01 RX ADMIN — MORPHINE SULFATE 1 MG: 2 INJECTION, SOLUTION INTRAMUSCULAR; INTRAVENOUS at 09:39

## 2020-01-01 RX ADMIN — DOCUSATE SODIUM 100 MG: 100 CAPSULE, LIQUID FILLED ORAL at 09:05

## 2020-01-01 RX ADMIN — FUROSEMIDE 40 MG: 10 INJECTION, SOLUTION INTRAMUSCULAR; INTRAVENOUS at 17:13

## 2020-01-01 RX ADMIN — PROPOFOL 5 MCG/KG/MIN: 10 INJECTION, EMULSION INTRAVENOUS at 06:36

## 2020-01-01 RX ADMIN — LEVOTHYROXINE SODIUM 50 MCG: 50 TABLET ORAL at 09:05

## 2020-01-01 RX ADMIN — MINERAL OIL: 1000 LIQUID ORAL at 20:54

## 2020-01-01 RX ADMIN — LEVOTHYROXINE SODIUM 50 MCG: 50 TABLET ORAL at 18:35

## 2020-01-01 RX ADMIN — MONTELUKAST SODIUM 10 MG: 10 TABLET, COATED ORAL at 09:05

## 2020-01-01 RX ADMIN — ACETAMINOPHEN 650 MG: 325 TABLET, FILM COATED ORAL at 05:48

## 2020-01-01 RX ADMIN — MORPHINE SULFATE 1 MG: 2 INJECTION, SOLUTION INTRAMUSCULAR; INTRAVENOUS at 07:12

## 2020-01-01 RX ADMIN — MORPHINE SULFATE 1 MG: 2 INJECTION, SOLUTION INTRAMUSCULAR; INTRAVENOUS at 18:36

## 2020-01-01 RX ADMIN — MORPHINE SULFATE 1 MG: 2 INJECTION, SOLUTION INTRAMUSCULAR; INTRAVENOUS at 12:10

## 2020-01-01 RX ADMIN — SODIUM CHLORIDE 1000 ML: 9 INJECTION, SOLUTION INTRAVENOUS at 09:28

## 2020-01-01 RX ADMIN — VECURONIUM BROMIDE 7 MG: 1 INJECTION, POWDER, LYOPHILIZED, FOR SOLUTION INTRAVENOUS at 06:25

## 2020-01-01 RX ADMIN — MORPHINE SULFATE 1 MG: 2 INJECTION, SOLUTION INTRAMUSCULAR; INTRAVENOUS at 16:16

## 2020-01-01 RX ADMIN — FUROSEMIDE 40 MG: 10 INJECTION, SOLUTION INTRAMUSCULAR; INTRAVENOUS at 05:48

## 2020-01-01 RX ADMIN — ACETAMINOPHEN 1000 MG: 500 TABLET, FILM COATED ORAL at 01:33

## 2020-01-01 RX ADMIN — DOCUSATE SODIUM 100 MG: 100 CAPSULE, LIQUID FILLED ORAL at 20:54

## 2020-01-01 RX ADMIN — MINERAL OIL: 1000 LIQUID ORAL at 17:13

## 2020-01-01 RX ADMIN — SODIUM CHLORIDE, PRESERVATIVE FREE 10 ML: 5 INJECTION INTRAVENOUS at 22:41

## 2020-01-01 RX ADMIN — FUROSEMIDE 40 MG: 10 INJECTION, SOLUTION INTRAMUSCULAR; INTRAVENOUS at 18:19

## 2020-01-10 NOTE — PROGRESS NOTES
"Subjective   Carin Yusuf is a 79 y.o. female.     Pneumonia   She complains of difficulty breathing and shortness of breath. There is no cough or wheezing. The current episode started more than 1 month ago. The problem has been gradually improving. Associated symptoms include malaise/fatigue. Pertinent negatives include no chest pain, fever, postnasal drip or sore throat. Her symptoms are aggravated by exercise (pulse ox drops to low 80s if off oxygen for 30 minutes). Her symptoms are not alleviated by beta-agonist. Her past medical history is significant for pneumonia.   Hypertension   This is a chronic (increased lower extremity edema) problem. The problem is controlled. Associated symptoms include malaise/fatigue and shortness of breath. Pertinent negatives include no chest pain or palpitations. Current antihypertension treatment includes angiotensin blockers and diuretics. The current treatment provides significant improvement. Compliance problems include medication side effects.         The following portions of the patient's history were reviewed and updated as appropriate: allergies, current medications, past social history and problem list.    Review of Systems   Constitutional: Positive for malaise/fatigue. Negative for fever.   HENT: Positive for congestion. Negative for postnasal drip and sore throat.         Nasal congestion    Respiratory: Positive for shortness of breath. Negative for cough and wheezing.    Cardiovascular: Positive for leg swelling. Negative for chest pain and palpitations.   Gastrointestinal: Negative for nausea and vomiting.   Genitourinary: Negative for dysuria and hematuria.       Objective   /72   Pulse 57   Resp 16   Ht 157.5 cm (62\")   Wt 62.1 kg (136 lb 12.8 oz)   SpO2 95%   BMI 25.02 kg/m²   Physical Exam   Constitutional: She is oriented to person, place, and time. She appears well-developed and well-nourished. She is cooperative.   HENT:   Head: " Normocephalic.   Right Ear: External ear normal.   Left Ear: External ear normal.   Mouth/Throat: Oropharynx is clear and moist.   Nasal congestion   Eyes: Pupils are equal, round, and reactive to light. Conjunctivae are normal. No scleral icterus.   Neck: Neck supple. Carotid bruit is not present. No thyromegaly present.   Cardiovascular: Normal rate, regular rhythm and normal heart sounds.   Pulmonary/Chest: Effort normal. She has no wheezes. She has no rales.   Decreased bs at bases   Abdominal: There is no hepatosplenomegaly.   Musculoskeletal: Normal range of motion. She exhibits edema and tenderness.   Neurological: She is alert and oriented to person, place, and time.   No focal deficits no lateralizing signs   Skin: Skin is warm and dry. No rash noted.   Psychiatric: She has a normal mood and affect. Cognition and memory are normal.   Nursing note and vitals reviewed.      Assessment/Plan   Problem List Items Addressed This Visit        Active Problems    Essential hypertension    Relevant Medications    amLODIPine (NORVASC) 5 MG tablet    furosemide (LASIX) 40 MG tablet    Other Relevant Orders    Basic Metabolic Panel    Pneumonia of left lower lobe due to infectious organism (CMS/HCC) - Primary    Relevant Orders    XR Chest 2 View      Other Visit Diagnoses     Localized edema        Relevant Orders    Basic Metabolic Panel          New Medications Ordered This Visit   Medications   • amLODIPine (NORVASC) 5 MG tablet     Sig: Take 1 tablet by mouth Daily.     Dispense:  90 tablet     Refill:  1   • furosemide (LASIX) 40 MG tablet     Sig: Take 1 tablet by mouth Every Morning.     Dispense:  90 tablet     Refill:  1     **Patient requests 90 days supply**   • potassium chloride (MICRO-K) 8 MEQ CR capsule     Sig: Take 1 capsule by mouth Daily.     Dispense:  90 capsule     Refill:  1     **Patient requests 90 days supply**       Reduce amlodipine and monitor bp.Use nasal spray rx for portable o2  concentrator.

## 2020-01-27 NOTE — TELEPHONE ENCOUNTER
Pt called and stated that she is bleeding from perineal area. Pt is requesting a call back at [phone].

## 2020-01-30 NOTE — PROGRESS NOTES
"Subjective   Carin Yusuf is a 79 y.o. female.     Vaginal Bleeding   The patient's primary symptoms include vaginal bleeding. The patient's pertinent negatives include no genital odor or pelvic pain. Primary symptoms comment: Noticed some blood dripping from the vaginal area when she went to the bathroom this happened on 2 occasions was a relatively minor amount no clotting no pain. This is a new problem. The current episode started in the past 7 days. The problem has been resolved. The patient is experiencing no pain. She is not pregnant. Pertinent negatives include no chills, dysuria, fever or sore throat. The vaginal discharge was normal. The vaginal bleeding is spotting. She has not been passing clots. She has not been passing tissue. Nothing aggravates the symptoms. She has tried nothing for the symptoms. She is postmenopausal.        The following portions of the patient's history were reviewed and updated as appropriate: allergies, current medications, past social history and problem list.    Review of Systems   Constitutional: Negative for chills and fever.   HENT: Negative for congestion and sore throat.    Respiratory: Negative for cough and shortness of breath.    Cardiovascular: Negative for chest pain and palpitations.   Gastrointestinal: Negative for blood in stool and rectal pain.   Genitourinary: Positive for vaginal bleeding. Negative for dysuria and pelvic pain.       Objective   /84   Pulse 62   Temp 97.3 °F (36.3 °C)   Resp 16   Ht 157.5 cm (62\")   Wt 60.8 kg (134 lb)   SpO2 97%   BMI 24.51 kg/m²   Physical Exam   Constitutional: She is oriented to person, place, and time. She appears well-developed and well-nourished.   HENT:   Head: Normocephalic and atraumatic.   Eyes: Pupils are equal, round, and reactive to light. Conjunctivae are normal.   Neck: Neck supple.   Cardiovascular: Normal rate, regular rhythm and normal heart sounds.   Pulmonary/Chest: Effort normal and breath " sounds normal.   Genitourinary:   Genitourinary Comments: Pelvic exam reveals some atrophic vaginitis with some excoriation of the lower labia majora, speculum exam exam of the vagina was normal except for atrophy cervix was absent, rectal exam was normal stool for occult blood was negative   Musculoskeletal: She exhibits no edema.   Neurological: She is alert and oriented to person, place, and time.   Tremor unchanged   Nursing note and vitals reviewed.      Assessment/Plan   Problem List Items Addressed This Visit     None      Visit Diagnoses     Post-menopausal atrophic vaginitis    -  Primary          New Medications Ordered This Visit   Medications   • estradiol (ESTRACE VAGINAL) 0.1 MG/GM vaginal cream     Sig: Apply locally 3 days a week     Dispense:  42.5 g     Refill:  0     Keep regular follow-up visit or return to clinic if increased bleeding pain redness or swelling.

## 2020-02-06 NOTE — PROGRESS NOTES
"  PROBLEM LIST:  1. Waldenstrom's macroglobulinemia:  a) Splenomegaly was noted on a CT of the chest on 06/06/2015 done for weight  loss, decreased appetite and left upper quadrant pain.   b) Lab evaluation on 06/16/2015 showed an anemia with hemoglobin 10.2, thrombocytopenia with platelets 116, a 0.6 g/dL IgM lambda monoclonal protein was identified on SPEP, beta-2 microglobulin was 10.4, . 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   c) Treatment with Ibrutinib was started 07/2015.   2. Hypertension.   3. Essential tremor.   4. Scoliosis.     Subjective       Chief complaint: Management of Waldenstrom's macroglobulinemia    HISTORY OF PRESENT ILLNESS:   Ms. Yusuf returns for follow-up.  She is tolerating her ibrutinib.  She is still wearing oxygen and states she is getting around fairly well.  She has difficulty walking long distances.  She still has a cough with whitish sputum.  Her PCP has adjusted her diuretics and blood pressure medications recently.  Denies fever, chills, nausea, vomiting, or diarrhea.       Past Medical History, Past Surgical History, Social History, Family History have been reviewed and are without significant changes except as mentioned.    Review of Systems   A comprehensive 14 point review of systems was performed and was negative except as mentioned.    Medications:  The current medication list was reviewed in the EMR    ALLERGIES:    Allergies   Allergen Reactions   • Bactrim [Sulfamethoxazole-Trimethoprim] Other (See Comments)     unknown   • Sulfa Antibiotics Other (See Comments)     unknown   • Vancomycin Other (See Comments)     unknown   • Zosyn [Piperacillin Sod-Tazobactam So] Other (See Comments)     unknown       Objective      /70 Comment: LUE  Pulse 59   Temp 97.2 °F (36.2 °C) (Temporal)   Resp 20   Ht 157.5 cm (62\")   Wt 62.6 kg " (138 lb)   SpO2 94% Comment: 2L  BMI 25.24 kg/m²      Performance Status: 2    General: well appearing female in no acute distress  Neuro: alert and oriented  HEENT: sclera anicteric, oropharynx clear  Lymphatics: no cervical, supraclavicular, or axillary adenopathy  Cardiovascular: regular rate and rhythm, no murmurs  Lungs: clear to auscultation bilaterally, wearing oxygen at 2 liters NC  Abdomen: soft, nontender  Extremeties: 1+ lower extremity edema bilaterally  Skin: Moderate bruising on forearms  Psych: mood and affect appropriate    Labs:   Lab Results   Component Value Date    HGB 9.5 (L) 02/06/2020    HCT 29.9 (L) 02/06/2020    MCV 83.0 02/06/2020     (L) 02/06/2020    WBC 4.00 02/06/2020    NEUTROABS 2.30 02/06/2020    LYMPHSABS 1.40 02/06/2020    MONOSABS 0.30 02/06/2020    EOSABS 0.06 10/26/2019    BASOSABS 0.03 10/26/2019     Lab Results   Component Value Date    GLUCOSE 136 (H) 02/06/2020    BUN 21 02/06/2020    CREATININE 1.56 (H) 02/06/2020     02/06/2020    K 4.2 02/06/2020     02/06/2020    CO2 30.0 (H) 02/06/2020    CALCIUM 9.1 02/06/2020    PROTEINTOT 6.8 02/06/2020    ALBUMIN 4.00 02/06/2020    BILITOT 0.6 02/06/2020    ALKPHOS 90 02/06/2020    AST 14 02/06/2020    ALT 5 02/06/2020     .tracy  Assessment/Plan   Carin Yusuf is a 79 y.o. female with Waldenström's macroglobulinemia who returns for follow up.       In terms of her Waldenstrom's, we will plan to continue ibrutinib at the current dose.  She is a candidate for more intensive treatment.  Her IgM protein is gradually increasing but it does not seem to be causing any symptoms and the rate of increase is fairly slow.  We will continue to monitor.  Will review lab results from today and notify patient of significant findings.      F/u in 3 months with labs.        I spent 15 minutes with the patient. I spent > 50% percent of this time counseling and discussing prognosis, diagnostic testing, evaluation, current status  and management.      DANIELLE Arana  [facility] Hematology and Oncology    2/6/2020          CC:

## 2020-02-10 NOTE — TELEPHONE ENCOUNTER
Per Dr. Alvarez, I called patient to let her know her IgG was stable (lymphoma protein) and patient was pleased with the call.

## 2020-04-02 NOTE — PLAN OF CARE
Received refill request from [employer] for ibrutinib. Reviewed most recent oncology office visit note dated 2/6/2020. Plan for continuation of ibrutinib with no dose adjustments. Released script for ibrutinib 280mg PO daily, #28 with 11RF to [facility] specialty pharmacy for continuation of treatment.

## 2020-05-05 NOTE — TELEPHONE ENCOUNTER
I called patient back and she had changed her appt to a telephone visit for May 14.  I told her that the lab orders were in the system so if she goes to [facility], they will be able to draw her labs.  She will go a few days before the appt.

## 2020-05-05 NOTE — TELEPHONE ENCOUNTER
Patient wants to cancel 5/7 appointment.      She needs labwork.  She wants to get it done at [facility].  Please send order there.    When she gets labs done, she would like to then speak to someone to get results and make appointment with Dr. Alvarez at that time, if needed.    She says she is doing well.      [phone]

## 2020-05-13 PROBLEM — Z99.81 SUPPLEMENTAL OXYGEN DEPENDENT: Status: ACTIVE | Noted: 2020-01-01

## 2020-05-13 NOTE — PROGRESS NOTES
Subjective   Carin Yusuf is a 79 y.o. female.     Hypertension   This is a chronic problem. The current episode started more than 1 year ago. The problem is unchanged. The problem is controlled. Associated symptoms include shortness of breath. Pertinent negatives include no chest pain, headaches, palpitations or peripheral edema. Risk factors for coronary artery disease include post-menopausal state and sedentary lifestyle. The current treatment provides significant improvement. There are no compliance problems.    Shortness of Breath   This is a chronic (Doing well on current oxygen 2 L checks her pulse ox at home ranges between 94 and 98 goes down a little with exertion) problem. The current episode started more than 1 year ago. The problem occurs constantly. The problem has been unchanged. Pertinent negatives include no chest pain, headaches, leg swelling, sore throat or vomiting. The symptoms are aggravated by exercise. The treatment provided moderate relief. Her past medical history is significant for chronic lung disease.        The following portions of the patient's history were reviewed and updated as appropriate: allergies, current medications, past social history and problem list.    Review of Systems   Constitutional: Negative for activity change and fatigue.   HENT: Negative for congestion and sore throat.    Eyes: Negative for pain and visual disturbance.   Respiratory: Positive for shortness of breath. Negative for chest tightness.    Cardiovascular: Negative for chest pain, palpitations and leg swelling.   Gastrointestinal: Negative for diarrhea, nausea and vomiting.   Genitourinary: Negative for dysuria and hematuria.   Neurological: Positive for tremors. Negative for dizziness, syncope and headaches.     Blood pressure readings from home in the 1 45-1 63 range systolic 60-70 diastolic  Objective   There were no vitals taken for this visit.  Physical Exam   Constitutional: She appears  well-developed and well-nourished.   Skin: Skin is warm and dry.   Psychiatric: She has a normal mood and affect.   Vitals reviewed.  Video visit    Assessment/Plan   Problem List Items Addressed This Visit        Active Problems    Essential hypertension - Primary    Supplemental oxygen dependent          No orders of the defined types were placed in this encounter.    Continue current medications follow-up with oncology soon by phone, keep regular follow-up with nephrology.  Follow-up with me in 3 weeks either by video or office visit.

## 2020-05-14 NOTE — PROGRESS NOTES
Visit conducted via telephone per patient preference.  Start time 2:53.  End time 3:11.    PROBLEM LIST:  1. Waldenstrom's macroglobulinemia:  a) Splenomegaly was noted on a CT of the chest on 06/06/2015 done for weight  loss, decreased appetite and left upper quadrant pain.   b) Lab evaluation on 06/16/2015 showed an anemia with hemoglobin 10.2, thrombocytopenia with platelets 116, a 0.6 g/dL IgM lambda monoclonal protein was identified on SPEP, beta-2 microglobulin was 10.4, . 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   c) Treatment with Ibrutinib was started 07/2015.   2. Hypertension.   3. Essential tremor.   4. Scoliosis.     Subjective       Chief complaint: Management of Waldenstrom's macroglobulinemia    HISTORY OF PRESENT ILLNESS:   Ms. Yusuf returns for follow-up.  She contines to wear oxygen full time, although sometimes she wakes up in the morning without it on.  Sometimes when she is up and moving around her O2 levels will drop down into the low 80s/high 70s.   She says overall she is doing okay.  She has not really left the house more than 3 or 4 times since November.  Her son is there and keeps her company.    Past Medical History, Past Surgical History, Social History, Family History have been reviewed and are without significant changes except as mentioned.    Review of Systems   A comprehensive 14 point review of systems was performed and was negative except as mentioned.    Medications:  The current medication list was reviewed in the EMR    ALLERGIES:    Allergies   Allergen Reactions   • Bactrim [Sulfamethoxazole-Trimethoprim] Other (See Comments)     unknown   • Sulfa Antibiotics Other (See Comments)     unknown   • Vancomycin Other (See Comments)     unknown   • Zosyn [Piperacillin Sod-Tazobactam So] Other (See Comments)     unknown       Objective      BP  138/62   Temp 97.4 °F (36.3 °C) (Oral)   SpO2 98%      Performance Status: 2    General: well appearing female in no acute distress  Neuro: alert and oriented  Psych: mood and affect appropriate    Labs:   Lab Results   Component Value Date    HGB 9.4 (L) 05/07/2020    HCT 33.6 (L) 05/07/2020    MCV 86.8 05/07/2020     (L) 05/07/2020    WBC 3.88 05/07/2020    NEUTROABS 2.50 05/07/2020    LYMPHSABS 1.10 05/07/2020    MONOSABS 0.22 05/07/2020    EOSABS 0.03 05/07/2020    BASOSABS 0.02 05/07/2020     Lab Results   Component Value Date    GLUCOSE 96 05/07/2020    BUN 25 (H) 05/07/2020    CREATININE 1.78 (H) 05/07/2020     05/07/2020    K 4.4 05/07/2020    CL 94 (L) 05/07/2020    CO2 36.0 (H) 05/07/2020    CALCIUM 8.7 05/07/2020    PROTEINTOT 6.7 05/07/2020    ALBUMIN 3.80 05/07/2020    ALBUMIN 3.5 05/07/2020    BILITOT 0.8 05/07/2020    ALKPHOS 75 05/07/2020    AST 11 05/07/2020    ALT <5 05/07/2020     Results for LUPE HILL (MRN [id number]) as of 5/14/2020 14:54   Ref. Range 5/7/2020 14:00   Globulin Latest Ref Range: 2.2 - 3.9 g/dL 2.7   Alpha-1-Globulin Latest Ref Range: 0.0 - 0.4 g/dL 0.3   Alpha-2-Globulin Latest Ref Range: 0.4 - 1.0 g/dL 0.6   Beta Globulin Latest Ref Range: 0.7 - 1.3 g/dL 1.6 (H)   Gamma Globulin Latest Ref Range: 0.4 - 1.8 g/dL 0.3 (L)   M-Torrey Latest Ref Range: Not Observed g/dL 0.7 (H)   Immunofixation Reflex, Serum Unknown Comment     Assessment/Plan   Lupe Hill is a 79 y.o. female with Waldenström's macroglobulinemia who returns for follow up.     Her Waldenström's appears to be stable.  No change in her IgM protein level.  Blood counts are otherwise unremarkable with stable mild anemia.  We will continue ibrutinib at the current dose.    Chronic hypoxia: Continue oxygen therapy.  She is interested in getting a more easily portable oxygen device and I suggested that she talk to her supplier about this.    F/u in 3 months with labs.        I spent 15 minutes  with the patient. I spent > 50% percent of this time counseling and discussing prognosis, diagnostic testing, evaluation, current status and management.      Kasey Alvarez MD  [facility] Hematology and Oncology    5/14/2020          CC:

## 2020-07-31 NOTE — TELEPHONE ENCOUNTER
Called patient at 7:50 pm 7/31/20. discussed her situation with her pharmacy. She will be missing 3 potassium tablets between today and Monday when the order should be in for her  pharmacy.   She declined sending the RX to another pharmacy.    Discussed eating foods that have potassium in them as a supplement. Orange juice, banannas and baked potatoes. She is agreeable to add some in to her daily diet.     Follow up as needed.   Her last potassium was 4.4 mmol/L  05/07/20.  DANIELLE Rodriguez              Patient states that she take Potassium and the pharmacy did not have enough medicine to give her and she is out and was wondering if it would be okay to wait until Monday.  She can be reached at [phone]

## 2020-08-05 NOTE — TELEPHONE ENCOUNTER
PATIENT IS WANTING TO KNOW IF SHE NEEDS TO HAVE BLOOD WORK DONE BEFORE HER VIRTUAL APPOINTMENT  ON 8-12-20     PLEASE CALL WHEN ORDER IS IN   [phone]    PATIENT WOULD LIKE TO GO TO [facility] ON [address]

## 2020-08-12 NOTE — PROGRESS NOTES
Subjective   Carin Yusuf is a 79 y.o. female.     Hypertension   This is a chronic problem. The current episode started more than 1 year ago. The problem is unchanged. The problem is controlled. Associated symptoms include shortness of breath. Pertinent negatives include no chest pain, headaches, palpitations or peripheral edema. Risk factors for coronary artery disease include post-menopausal state and sedentary lifestyle. The current treatment provides significant improvement. There are no compliance problems.    Shortness of Breath   This is a chronic (Doing well on current oxygen 2 L checks her pulse ox at home ranges between 94 and 98 goes down a little with exertion) problem. The current episode started more than 1 year ago. The problem occurs constantly. The problem has been unchanged. Pertinent negatives include no chest pain, fever, headaches, leg swelling, sore throat or vomiting. The symptoms are aggravated by exercise. The treatment provided moderate relief. Her past medical history is significant for chronic lung disease.        The following portions of the patient's history were reviewed and updated as appropriate: allergies, current medications, past social history and problem list.    Review of Systems   Constitutional: Negative for chills and fever.   HENT: Negative for congestion and sore throat.    Respiratory: Positive for cough and shortness of breath.    Cardiovascular: Negative for chest pain, palpitations and leg swelling.   Gastrointestinal: Negative for diarrhea, nausea and vomiting.   Neurological: Negative for numbness and headaches.       Objective   There were no vitals taken for this visit.  Physical Exam   Constitutional: She is oriented to person, place, and time.   Neurological: She is alert and oriented to person, place, and time.   Psychiatric: She has a normal mood and affect.       Assessment/Plan   Problem List Items Addressed This Visit        Active Problems     Essential hypertension - Primary    Relevant Orders    Lipid Panel    TSH    Chronic renal impairment, stage 3 (moderate) (CMS/HCC)      Other Visit Diagnoses     Other hyperlipidemia        Relevant Orders    Lipid Panel    TSH    Vitamin D deficiency        Relevant Orders    Vitamin D 25 Hydroxy    Hypoxia            Unable to complete visit using a video connection to the patient. A phone visit was used to complete this visits. Total time of discussion was 16 minutes.    No orders of the defined types were placed in this encounter.    Patient continues to need home oxygen and becomes hypoxic when she takes it off or exerts herself.  Recommend she remain on continuous oxygen 2 L for the foreseeable future.  We will try Tessalon Perles which she has at home for the cough and continue Mucinex and consider increasing the dose of the Mucinex.

## 2020-08-20 NOTE — PROGRESS NOTES
Visit conducted via telephone per patient preference.  Start time 3:52.  End time 4:06    PROBLEM LIST:  1. Waldenstrom's macroglobulinemia:  a) Splenomegaly was noted on a CT of the chest on 06/06/2015 done for weight  loss, decreased appetite and left upper quadrant pain.   b) Lab evaluation on 06/16/2015 showed an anemia with hemoglobin 10.2, thrombocytopenia with platelets 116, a 0.6 g/dL IgM lambda monoclonal protein was identified on SPEP, beta-2 microglobulin was 10.4, . 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   c) Treatment with Ibrutinib was started 07/2015.   2. Hypertension.   3. Essential tremor.   4. Scoliosis.     Subjective       Chief complaint: Management of Waldenstrom's macroglobulinemia    HISTORY OF PRESENT ILLNESS:   Ms. Yusuf returns for follow-up.  She reports that she is feeling okay.  She mostly stays in the house.  She is pretty limited in her cavity because her oxygen level drops.  She says she still has a cough and that seems worse recently.  She feels like she has more congestion and the cough is more productive.  She has not had any fevers.    Past Medical History, Past Surgical History, Social History, Family History have been reviewed and are without significant changes except as mentioned.    Review of Systems   A comprehensive 14 point review of systems was performed and was negative except as mentioned.    Medications:  The current medication list was reviewed in the EMR    ALLERGIES:    Allergies   Allergen Reactions   • Bactrim [Sulfamethoxazole-Trimethoprim] Other (See Comments)     unknown   • Sulfa Antibiotics Other (See Comments)     unknown   • Vancomycin Other (See Comments)     unknown   • Zosyn [Piperacillin Sod-Tazobactam So] Other (See Comments)     unknown       Objective      /66   Temp 96.8 °F (36 °C) (Oral)   Ht 157.5 cm  "(62\")   Wt 59 kg (130 lb)   BMI 23.78 kg/m²      Performance Status: 2    General:  female in no acute distress  Neuro: alert and oriented  Psych: mood and affect appropriate    Labs:   Lab Results   Component Value Date    HGB 9.0 (L) 08/13/2020    HCT 31.4 (L) 08/13/2020    MCV 87.2 08/13/2020    PLT 90 (L) 08/13/2020    WBC 3.24 (L) 08/13/2020    NEUTROABS 1.91 08/13/2020    LYMPHSABS 1.30 07/08/2020    MONOSABS 0.22 07/08/2020    EOSABS 0.00 08/13/2020    BASOSABS 0.00 08/13/2020     Lab Results   Component Value Date    GLUCOSE 102 (H) 08/13/2020    BUN 24 (H) 08/13/2020    CREATININE 1.61 (H) 08/13/2020     08/13/2020    K 4.1 08/13/2020    CL 97 (L) 08/13/2020    CO2 37.0 (H) 08/13/2020    CALCIUM 8.9 08/13/2020    PROTEINTOT 6.5 08/13/2020    ALBUMIN 3.90 08/13/2020    ALBUMIN 3.3 08/13/2020    BILITOT 0.8 08/13/2020    ALKPHOS 75 08/13/2020    AST 16 08/13/2020    ALT <5 08/13/2020       Results for LUPE HILL (MRN [id number]) as of 8/20/2020 15:53   Ref. Range 8/13/2020 07:59   Globulin Latest Ref Range: 2.2 - 3.9 g/dL 2.8   Alpha-1-Globulin Latest Ref Range: 0.0 - 0.4 g/dL 0.2   Alpha-2-Globulin Latest Ref Range: 0.4 - 1.0 g/dL 0.6   Beta Globulin Latest Ref Range: 0.7 - 1.3 g/dL 1.7 (H)   Gamma Globulin Latest Ref Range: 0.4 - 1.8 g/dL 0.3 (L)   M-Torrey Latest Ref Range: Not Observed g/dL 0.8 (H)   Immunofixation Reflex, Serum Unknown Comment       Assessment/Plan   Lupe Hill is a 79 y.o. female with Waldenström's macroglobulinemia who returns for follow up.     H IgM protein level has gradually increased over the past several months.  She does not appear to have any new disease related symptoms.  She is tolerating a low dose of Imbruvica fairly well.  She does have some progressive anemia as well.  At this point I do not think she is a candidate for a more aggressive or any change in treatment.  We will continue Imbruvica for now.    Chronic hypoxia: Continue oxygen " therapy.    Worsening cough and congestion: I will send a prescription for antibiotic to her pharmacy to see if this helps to improve her symptoms.  Discussed that if she has significant worsening of her breathing or cough she may need in person evaluation.    F/u in 3 months with labs.        I spent 15 minutes with the patient. I spent > 50% percent of this time counseling and discussing prognosis, diagnostic testing, evaluation, current status and management.      Kasey Alvarez MD  [facility]    8/20/2020          CC:

## 2020-09-08 NOTE — TELEPHONE ENCOUNTER
PT SON CALLED IN STATED PT IS HAVING A LOT OF COUGHING AND CHEST CONGESTION SON IS REQUESTING A CB PLEASE ADVISE      MELINA CB NUMBER : [phone]

## 2020-09-10 NOTE — PROGRESS NOTES
Subjective   Carin Yusuf is a 80 y.o. female.     Hypertension   This is a chronic problem. The current episode started more than 1 year ago. The problem is unchanged. The problem is controlled. Associated symptoms include shortness of breath. Pertinent negatives include no chest pain, headaches, palpitations or peripheral edema. Risk factors for coronary artery disease include post-menopausal state and sedentary lifestyle. The current treatment provides significant improvement. There are no compliance problems.    Shortness of Breath   This is a chronic (Doing well on current oxygen 2 L checks her pulse ox at home ranges between 94 and 98 goes down a little with exertion) problem. The current episode started more than 1 year ago. The problem occurs constantly. The problem has been unchanged. Pertinent negatives include no chest pain, fever, headaches, leg swelling, sore throat or vomiting. The symptoms are aggravated by exercise. The treatment provided moderate relief. Her past medical history is significant for chronic lung disease.   Cough   This is a recurrent problem. The current episode started 1 to 4 weeks ago. The problem has been unchanged. The problem occurs every few minutes. The cough is non-productive. Associated symptoms include shortness of breath. Pertinent negatives include no chest pain, fever, headaches or sore throat. She has tried OTC cough suppressant for the symptoms. The treatment provided no relief.        The following portions of the patient's history were reviewed and updated as appropriate: allergies, current medications, past social history and problem list.    Review of Systems   Constitutional: Negative for activity change, fatigue and fever.   HENT: Negative for sore throat.    Eyes: Negative for pain and visual disturbance.   Respiratory: Positive for cough and shortness of breath. Negative for chest tightness.    Cardiovascular: Negative for chest pain, palpitations and leg  swelling.   Gastrointestinal: Negative for diarrhea, nausea and vomiting.   Genitourinary: Negative for dysuria and hematuria.   Neurological: Negative for dizziness, syncope and headaches.       Objective   There were no vitals taken for this visit.  Physical Exam   Constitutional: She is oriented to person, place, and time. She appears well-developed and well-nourished.   Neurological: She is alert and oriented to person, place, and time.   Psychiatric: She has a normal mood and affect.     Dry raspy cough  Assessment/Plan   Problem List Items Addressed This Visit        Active Problems    Essential hypertension - Primary      Other Visit Diagnoses     Bronchitis            Probable atypical bronchitis will try doxycycline.  No orders of the defined types were placed in this encounter.

## 2020-09-14 NOTE — TELEPHONE ENCOUNTER
PATIENTS SON STATES THAT THE PATIENT IS STILL EXPERIENCING A LOT OF CONGESTION. SHE IS TAKING THE MEDICATION AND IT IS NOT HELPING. HE WOULD LIKE TO KNOW NEXT STEPS.       ANDRES GASTELUM [phone]

## 2020-09-15 NOTE — TELEPHONE ENCOUNTER
Called pt son back and he said that he would call back because he is not sure due to her condition at the moment. He said her oxygen is running 98 but with 2 1/2 to 3 liters of oxygen on. Can this be done as a video or telemedicine visit? Pt son did not refuse to come in he just wanted to check alternatives first.

## 2020-09-17 NOTE — PROGRESS NOTES
"Subjective   Carintutu Yusuf is a 80 y.o. female.     Cough  This is a recurrent problem. The current episode started 1 to 4 weeks ago. The problem has been unchanged. The problem occurs every few minutes. The cough is non-productive. Associated symptoms include shortness of breath. Pertinent negatives include no chest pain, chills, fever, hemoptysis, sore throat or sweats. Treatments tried: Antibiotics recently doxycycline. The treatment provided mild relief. Her past medical history is significant for pneumonia. Severe scoliosis        The following portions of the patient's history were reviewed and updated as appropriate: allergies, current medications, past social history and problem list.    Review of Systems   Constitutional: Negative for chills and fever.   HENT: Negative for congestion and sore throat.    Respiratory: Positive for cough and shortness of breath. Negative for hemoptysis.    Cardiovascular: Negative for chest pain, palpitations and leg swelling.   Genitourinary: Negative for dysuria and frequency.   Neurological: Negative for dizziness and syncope.       Objective   /64   Pulse 60   Temp 98 °F (36.7 °C)   Resp 18   Ht 157.5 cm (62\")   Wt 58.5 kg (129 lb)   SpO2 98%   BMI 23.59 kg/m²   Physical Exam  Vitals signs and nursing note reviewed. Exam conducted with a chaperone present.   Constitutional:       Appearance: Normal appearance. She is well-developed.   HENT:      Head: Normocephalic.      Right Ear: External ear normal.      Left Ear: External ear normal.      Mouth/Throat:      Comments: Clear postnasal drip  Eyes:      General: No scleral icterus.        Right eye: No discharge.         Left eye: No discharge.      Conjunctiva/sclera: Conjunctivae normal.      Pupils: Pupils are equal, round, and reactive to light.   Neck:      Musculoskeletal: Neck supple.      Thyroid: No thyromegaly.      Vascular: No carotid bruit or JVD.   Cardiovascular:      Rate and Rhythm: Normal " rate and regular rhythm.      Pulses:           Dorsalis pedis pulses are 2+ on the right side.        Posterior tibial pulses are 2+ on the right side.      Heart sounds: Normal heart sounds. No murmur. No friction rub. No gallop.    Pulmonary:      Effort: Pulmonary effort is normal.      Breath sounds: No wheezing.      Comments: Decreased breath sounds at the left base no rales no wheezes no air hunger socks normal on oxygen  Abdominal:      General: Bowel sounds are normal.      Palpations: Abdomen is soft. There is no mass.      Tenderness: There is no abdominal tenderness.      Hernia: No hernia is present.   Musculoskeletal: Normal range of motion.         General: No tenderness or deformity.   Lymphadenopathy:      Cervical: No cervical adenopathy.   Skin:     General: Skin is warm and dry.      Findings: No erythema or rash.   Neurological:      Mental Status: She is alert and oriented to person, place, and time.      Cranial Nerves: No cranial nerve deficit.      Motor: No abnormal muscle tone.      Coordination: Coordination normal.      Deep Tendon Reflexes: Reflexes are normal and symmetric. Reflexes normal.      Comments: No focal deficits no lateralizing signs   Psychiatric:         Mood and Affect: Mood normal.         Behavior: Behavior normal. Behavior is cooperative.         Assessment/Plan   Problem List Items Addressed This Visit     None      Visit Diagnoses     Cough in adult    -  Primary    Relevant Orders    POC CBC    Basic Metabolic Panel    XR Chest 2 View          No orders of the defined types were placed in this encounter.    Finish antibiotics, may need to consider short course of steroids ending chest x-ray and lab.

## 2020-11-02 NOTE — TELEPHONE ENCOUNTER
Patient wanted to let Dr. Vasques know she received her flu vaccine on 10/27/20 at [facility]. Patient would like her vaccine record updated with this information.    Patient call back [phone]

## 2020-11-19 NOTE — PROGRESS NOTES
Visit conducted via telephone per patient preference.  Start time 1:50.  End time 2:15    PROBLEM LIST:  1. Waldenstrom's macroglobulinemia:  a) Splenomegaly was noted on a CT of the chest on 06/06/2015 done for weight  loss, decreased appetite and left upper quadrant pain.   b) Lab evaluation on 06/16/2015 showed an anemia with hemoglobin 10.2, thrombocytopenia with platelets 116, a 0.6 g/dL IgM lambda monoclonal protein was identified on SPEP, beta-2 microglobulin was 10.4, . 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   c) Treatment with Ibrutinib was started 07/2015.   2. Hypertension.   3. Essential tremor.   4. Scoliosis.     Subjective       Chief complaint: Management of Waldenstrom's macroglobulinemia    HISTORY OF PRESENT ILLNESS:   Ms. Yusuf returns for follow-up.  She says she has good days and bad days, and this is a bad one. She feels like she wants to sleep all the time.   She hasn't been able to keep up with her usual housework.   She feels very weak. She really hasn't been out of her house in almost a year.   She has gained some weight - she has some swelling in her ankles and she wears some compression socks.  She wakes up in the morning feeling short of breath and has to sit up quickly.    Past Medical History, Past Surgical History, Social History, Family History have been reviewed and are without significant changes except as mentioned.    Review of Systems   A comprehensive 14 point review of systems was performed and was negative except as mentioned.    Medications:  The current medication list was reviewed in the EMR    ALLERGIES:    Allergies   Allergen Reactions   • Bactrim [Sulfamethoxazole-Trimethoprim] Other (See Comments)     unknown   • Sulfa Antibiotics Other (See Comments)     unknown   • Vancomycin Other (See Comments)     unknown   • Zosyn  "[Piperacillin Sod-Tazobactam So] Other (See Comments)     unknown       Objective      /76   Temp 97.1 °F (36.2 °C) (Temporal)   Ht 157.5 cm (62\")   Wt 59 kg (130 lb)   SpO2 97%   BMI 23.78 kg/m²      Performance Status: 2    General:  female in no acute distress  Neuro: alert and oriented  Psych: mood and affect appropriate    Labs:   Lab Results   Component Value Date    HGB 8.3 (L) 11/10/2020    HCT 30.4 (L) 11/10/2020    MCV 92.1 11/10/2020     (L) 11/10/2020    WBC 3.22 (L) 11/10/2020    NEUTROABS 1.80 11/10/2020    LYMPHSABS 1.30 07/08/2020    MONOSABS 0.22 07/08/2020    EOSABS 0.03 11/10/2020    BASOSABS 0.00 11/10/2020     Lab Results   Component Value Date    GLUCOSE 105 (H) 11/10/2020    BUN 29 (H) 11/10/2020    CREATININE 1.56 (H) 11/10/2020     11/10/2020    K 4.0 11/10/2020    CL 90 (L) 11/10/2020    CO2 42.0 (H) 11/10/2020    CALCIUM 8.6 11/10/2020    PROTEINTOT 6.2 11/10/2020    ALBUMIN 3.60 11/10/2020    ALBUMIN 3.4 11/10/2020    BILITOT 0.7 11/10/2020    ALKPHOS 66 11/10/2020    AST 11 11/10/2020    ALT <5 11/10/2020       Assessment/Plan   Carin Yusuf is a 80 y.o. female with Waldenström's macroglobulinemia who returns for follow up.     Her IgM protein is increasing, and anemia has worsened compared to her last visit.  We discussed the option of changing treatment to single agent rituximab.  This would be given IV weekly x 4 doses, and then repeat that course in about 4 months.   She is uncertain about this. We talked about the option of hospice care which may be reasonable for her given her severe limitations.      She is not sure what she wants to do at this point.  I recommended she stop the imbruvica, and we will try toset up a visit in clinic with her son in the next few weeks to discuss options.    Chronic hypoxia: Continue oxygen therapy.        F/u 1-2 weeks.      I spent 15 minutes with the patient. I spent > 50% percent of this time counseling and discussing " prognosis, diagnostic testing, evaluation, current status and management.      Kasey Alvarez MD  [facility]    11/19/2020          CC:

## 2020-11-21 NOTE — PROGRESS NOTES
Subjective   Carin Yusuf is a 80 y.o. female.     Hypertension  This is a chronic problem. The current episode started more than 1 year ago. The problem is unchanged. The problem is controlled. Associated symptoms include peripheral edema and shortness of breath. Pertinent negatives include no chest pain, headaches or palpitations. Risk factors for coronary artery disease include post-menopausal state and sedentary lifestyle. Current antihypertension treatment includes beta blockers, diuretics, angiotensin blockers and alpha 1 blockers. The current treatment provides significant improvement. There are no compliance problems.  There is no history of angina, kidney disease or CAD/MI.   Shortness of Breath  This is a chronic (Doing well on current oxygen 2 L checks her pulse ox at home ranges between 94 and 98 goes down a little with exertion) problem. The current episode started more than 1 year ago. The problem occurs constantly. The problem has been unchanged. Associated symptoms include leg swelling. Pertinent negatives include no chest pain, fever, headaches, sore throat or vomiting. The symptoms are aggravated by any activity. The treatment provided moderate relief. Her past medical history is significant for chronic lung disease and COPD.   Cough  This is a recurrent (improved with neb tx) problem. The current episode started 1 to 4 weeks ago. The problem has been unchanged. The problem occurs every few minutes. The cough is non-productive. Associated symptoms include shortness of breath. Pertinent negatives include no chest pain, fever, headaches or sore throat. She has tried OTC cough suppressant and a beta-agonist inhaler for the symptoms. The treatment provided no relief. Her past medical history is significant for COPD.        The following portions of the patient's history were reviewed and updated as appropriate: allergies, current medications, past social history and problem list.    Review of  Systems   Constitutional: Positive for fatigue and unexpected weight change. Negative for activity change and fever.        Has gained 10 # soa in am   HENT: Negative for sinus pain and sore throat.    Eyes: Negative for pain and visual disturbance.   Respiratory: Positive for cough and shortness of breath. Negative for chest tightness.    Cardiovascular: Positive for leg swelling. Negative for chest pain and palpitations.   Gastrointestinal: Negative for diarrhea, nausea and vomiting.   Genitourinary: Negative for dysuria and hematuria.   Neurological: Negative for dizziness, syncope and headaches.       Objective   There were no vitals taken for this visit.  Physical Exam  Neurological:      Mental Status: She is alert.   Psychiatric:      Comments: Decreased mood     video visit lasting 18 minutes    Assessment/Plan   Problems Addressed this Visit        Active Problems    Essential hypertension - Primary    Supplemental oxygen dependent      Diagnoses       Codes Comments    Essential hypertension    -  Primary ICD-10-CM: I10  ICD-9-CM: 401.9     Supplemental oxygen dependent     ICD-10-CM: Z99.81  ICD-9-CM: V46.2           No orders of the defined types were placed in this encounter.    She is considering new treatment for WM  But is reluctant   Increase lasix to 40 bid if weight is up or gains over 2 #

## 2020-11-30 NOTE — TELEPHONE ENCOUNTER
I called Yared back to ask if he had specific questions and to tell him patient has an appt on Wednesday and may bring a visitor.  I had to leave voicemail as Yared did not answer.

## 2020-11-30 NOTE — TELEPHONE ENCOUNTER
Yared patient's son called and he wants Dr. Alvarez to call him. He needs to discuss a plan moving forward.

## 2020-11-30 NOTE — TELEPHONE ENCOUNTER
PATIENTS SON STATES THAT MOTHER IS HAVING  SOME REACTIONS TO THESE MEDICATIONS, levothyroxine (Synthroid) 50 MCG tablet, doxazosin (CARDURA) 1 MG tablet, AND POTASIUM PILLS. PATIENT SON STATES THAT HIS MOTHER SYMPTOMS ARE DIARRHEA, WEAKNESS, AND FATIGUE.OPATIENTS SON STATES THAT HE WOULD LIKE A CALL ABOUT THESE MEDICATIONS.    ANDRES'S CALL BACK [phone]

## 2020-12-01 PROBLEM — Z99.81 SUPPLEMENTAL OXYGEN DEPENDENT: Chronic | Status: ACTIVE | Noted: 2020-01-01

## 2020-12-01 PROBLEM — R40.4 UNRESPONSIVE EPISODE: Status: ACTIVE | Noted: 2020-01-01

## 2020-12-01 PROBLEM — D63.8 ANEMIA, CHRONIC DISEASE: Status: ACTIVE | Noted: 2020-01-01

## 2020-12-01 PROBLEM — I50.33 ACUTE ON CHRONIC DIASTOLIC CHF (CONGESTIVE HEART FAILURE) (HCC): Status: ACTIVE | Noted: 2020-01-01

## 2020-12-01 PROBLEM — N17.9 AKI (ACUTE KIDNEY INJURY) (HCC): Status: ACTIVE | Noted: 2020-01-01

## 2020-12-01 PROBLEM — J96.21 ACUTE ON CHRONIC RESPIRATORY FAILURE WITH HYPOXIA (HCC): Status: ACTIVE | Noted: 2020-01-01

## 2020-12-01 PROBLEM — I27.20 PULMONARY HYPERTENSION (HCC): Status: ACTIVE | Noted: 2020-01-01

## 2020-12-01 PROBLEM — R41.89 UNRESPONSIVE EPISODE: Status: ACTIVE | Noted: 2020-01-01

## 2020-12-01 NOTE — TELEPHONE ENCOUNTER
Patient's son Yared liz wants Dr. Alvarez to know she is in the ER at [facility], He cancelled her appointment for tomorrow.

## 2020-12-01 NOTE — PLAN OF CARE
Problem: Adult Inpatient Plan of Care  Goal: Plan of Care Review  Outcome: Ongoing, Progressing  Flowsheets (Taken 12/1/2020 1615)  Plan of Care Reviewed With:   patient   son   Goal Outcome Evaluation:  Plan of Care Reviewed With: patient, son      SLP evaluation completed. Will sign-off. Please see note for further details and recommendations.

## 2020-12-01 NOTE — ED PROVIDER NOTES
Subjective   Mrs. Yusuf is brought by EMS after becoming unresponsive at home.  Her son reports that he heard her scream and immediately went to her.  He found her completely unresponsive on the floor.  He called EMS.  They confirm that she was completely unresponsive and not protecting her airway and not exhibiting adequate respirations.  They placed an I-gel airway.  They did not have to do CPR and she has maintained vital signs throughout transport.  On arrival here she remains unresponsive.  I interview her son and he tells me that overall she is becoming weaker and weaker and has had some diarrhea recently.  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  He denies that she has had fevers or change in her baseline cough recently.      History provided by:  Medical records, relative and EMS personnel  History limited by:  Patient unresponsive  Altered Mental Status  Presenting symptoms: unresponsiveness    Severity:  Severe  Most recent episode:  Today  Episode history:  Single  Timing:  Constant  Progression:  Unchanged  Chronicity:  New  Context: recent illness    Associated symptoms: weakness        Review of Systems   Unable to perform ROS: Patient unresponsive   Gastrointestinal: Positive for diarrhea.   Neurological: Positive for weakness.       Past Medical History:   Diagnosis Date   • Abdominal pressure    • Abnormal laboratory test    • Acute on chronic diastolic CHF (congestive heart failure) (CMS/HCC) 12/1/2020   • Allergic rhinitis    • Asthma    • Chronic kidney disease    • Encounter for screening for cardiovascular disorders    • Essential tremor    • Hypertension    • Lymphoma (CMS/[facility])    • Pneumonia     Cough   • Scoliosis    • Tendonitis        Allergies   Allergen Reactions   • Bactrim [Sulfamethoxazole-Trimethoprim] Other (See Comments)     unknown   • Sulfa Antibiotics Other (See Comments)     unknown   • Vancomycin Other (See Comments)      unknown   • Zosyn [Piperacillin Sod-Tazobactam So] Other (See Comments)     unknown       Past Surgical History:   Procedure Laterality Date   • BACK SURGERY      1955   • ENDOSCOPY N/A 1/29/2018    Procedure: ESOPHAGOGASTRODUODENOSCOPY WITH BIOPSY;  Surgeon: Mark I Brunner, MD;  Location: [facility] ENDOSCOPY;  Service:    • ENDOSCOPY N/A 2/9/2018    Procedure: ESOPHAGOGASTRODUODENOSCOPY;  Surgeon: Drew Estrella MD;  Location: [facility] ENDOSCOPY;  Service:    • HEMORRHOIDECTOMY     • HYSTERECTOMY         Family History   Problem Relation Age of Onset   • Stroke Sister    • Stroke Brother    • Hypertension Son    • Esophageal cancer Sister    • Heart attack Mother    • Heart attack Father        Social History     Socioeconomic History   • Marital status:      Spouse name: Not on file   • Number of children: Not on file   • Years of education: Not on file   • Highest education level: Not on file   Tobacco Use   • Smoking status: Never Smoker   • Smokeless tobacco: Never Used   Substance and Sexual Activity   • Alcohol use: No   • Drug use: No   • Sexual activity: Defer   Social History Narrative    Carin Yusuf is a 77 year old white female. She lives with her son in [facility].           Objective   Physical Exam  Vitals signs and nursing note reviewed.   Constitutional:       Comments: She is minimally responsive.  She does not respond to IV sticks.  She does move a little bit when I pass an ET tube.   HENT:      Head: Normocephalic.      Comments: She has a superficial abrasion over the bridge of her nose with some dried blood around it     Nose: No congestion or rhinorrhea.      Mouth/Throat:      Mouth: Mucous membranes are moist.      Pharynx: No oropharyngeal exudate or posterior oropharyngeal erythema.   Eyes:      General: No scleral icterus.     Conjunctiva/sclera: Conjunctivae normal.      Pupils: Pupils are equal, round, and reactive to light.   Neck:      Musculoskeletal: Normal range  of motion and neck supple.      Comments: No JVD  Cardiovascular:      Rate and Rhythm: Regular rhythm. Bradycardia present.      Heart sounds: No murmur. No friction rub.   Pulmonary:      Comments: She has mildly decreased breath sounds on the left but there are no rales or wheezes.  She does not have any spontaneous respiratory effort  Abdominal:      General: Abdomen is flat.      Palpations: Abdomen is soft.   Musculoskeletal:         General: Deformity present.      Right lower leg: No edema.      Left lower leg: No edema.      Comments: Her right leg is about an inch shortened although there is no rotation, no crepitus over the greater trochanter   Skin:     General: Skin is warm and dry.      Capillary Refill: Capillary refill takes less than 2 seconds.   Neurological:      Comments: She is almost completely unresponsive, she does arouse a little bit when I passed the ET tube but does not have any movement of her extremities         Critical Care  Performed by: Twan Cuevas MD  Authorized by: Twan Cuevas MD     Critical care provider statement:     Critical care time (minutes):  35    Critical care was necessary to treat or prevent imminent or life-threatening deterioration of the following conditions:  CNS failure or compromise, circulatory failure, cardiac failure and respiratory failure    Critical care was time spent personally by me on the following activities:  Evaluation of patient's response to treatment, examination of patient, obtaining history from patient or surrogate, ventilator management, review of old charts, re-evaluation of patient's condition, pulse oximetry, ordering and review of radiographic studies, ordering and review of laboratory studies and ordering and performing treatments and interventions  Intubation    Date/Time: 12/1/2020 7:28 AM  Performed by: Twan Cuevas MD  Authorized by: Twan Cuevas MD     Consent:     Consent obtained:  Emergent  situation  Pre-procedure details:     Patient status:  Unresponsive    Mallampati score:  III    Pretreatment medications:  None    Paralytics:  None  Procedure details:     Preoxygenation:  CHARLI/LMA    CPR in progress: no      Intubation method:  Oral    Oral intubation technique:  Video-assisted    Laryngoscope blade:  Mac 4    Tube size (mm):  7.0    Tube type:  Cuffed    Number of attempts:  1  Placement assessment:     ETT to lip:  23    Tube secured with:  ETT oates    Breath sounds:  Reduced on left    Placement verification: chest rise, CXR verification, fiberoptic scope and tube exhalation    Post-procedure details:     Patient tolerance of procedure:  Tolerated well, no immediate complications               ED Course  ED Course as of Dec 01 1555   Tue Dec 01, 2020   6504 I spoke with Mrs. Yusuf son.  He thought she was a DNR but we have searched our documents and her most recent ACP document indicates she is a full code.  He brought her living will with him and I read that and it indicates she does not want to be kept alive on a ventilator but does not indicate she does not wish to be placed on it at all.    [DT]   4913 I saw Mrs. Yusuf upon arrival.  I assessed her respiratory status and she was not awake enough to attempt to breathe on her own.  I removed the I-gel airway placed by EMS.  Using a videoscope I passed a 7.0 ET tube.  We had positive condensation on the tube.  Breath sounds were a little bit diminished on the left so I backed the tube out 2 cm.  Repeat lung exam shows the breath sounds are still just a little bit diminished on the left.  I rechecked to position with the videoscope and it was good.  I then waited for chest x-ray and on x-ray the tube is in good position.  I worked with respiratory therapy for ventilator settings.  We are titrating her oxygen level downward.  She began coughing just a little bit so we have had to sedate and paralyze her.  I reviewed EKG which shows  bradycardia.  At times she was dropping into the 30s although maintaining good blood pressure.  I gave glucagon as it appears she is on a beta-blocker.  Heart rate has now come up into the 70s.    [DT]   0722 Saturations now 100% on 40% FiO2.  She is starting to move a little bit, I bolused her with propofol and we have increased her drip.  I have reviewed her ABG with the respiratory therapist.    [DT]   0758 She is starting to stir and cough, titrating propofl upward.    [DT]   0810 I brought her son to bedside. He feels she would not want to be on the vent at all. Was angry w him last year when she was put on bipap. He is not ready to remove ETT yet but is thinking about it. Declines pastoral care.    [DT]   0822 X-rays as well as the reports and they are negative for anything acute.I have reviewed her    [DT]   0826  dehydrated and have ordered IV fluids.I think she is is little higher than usual.Troponin is mildly elevated although is commensurate with her renal function.  Creatinine    [DT]   0827 Trop is mildly elevated but is commensurate w her renal fnctn. Creat is higher than typical for her. Have ordered IVFs.    [DT]   0855 Mrs. Yusuf family advises me they would like to have the tube removed.  We will coordinate this with respiratory therapy as well as her nurse    [DT]   0971 With respiratory therapy at bedside and as well as her nurse.  We stopped her propofol and watched her for a few minutes until she was opening her eyes and waking up.  At that point we sat her upright and remove the tube and suctioned her.  I have placed her on nonrebreather oxygen and her saturations dropped briefly into the upper 80s but now chronic 100% and she is coughing and clearing her secretions.  We will try nasal cannula.  Family is at bedside and advised they would like to continue to give IV fluids and antibiotics but mostly comfort directed care at this point.  I have paged the hospitalist on call.  And will seek  admission    [DT]   0944 Mrs. Yusuf is a little lethargic but talking a little bit to her family.  Saturations are 97% on 6 L.  I spoke with Dr. Powers who will admit    [DT]      ED Course User Index  [DT] Twan Cuevas MD                                           MDM  Number of Diagnoses or Management Options  Fall in home, initial encounter: new and requires workup  Lactic acidosis: new and requires workup  Unresponsive episode: new and requires workup  Volume depletion:      Amount and/or Complexity of Data Reviewed  Clinical lab tests: reviewed and ordered  Tests in the radiology section of CPT®: ordered and reviewed  Decide to obtain previous medical records or to obtain history from someone other than the patient: yes  Obtain history from someone other than the patient: yes  Review and summarize past medical records: yes  Discuss the patient with other providers: yes  Independent visualization of images, tracings, or specimens: yes    Critical Care  Total time providing critical care: 30-74 minutes    Patient Progress  Patient progress: improved      Final diagnoses:   Fall in home, initial encounter   Unresponsive episode   Lactic acidosis   Volume depletion            Twan Cuevas MD  12/01/20 3154

## 2020-12-01 NOTE — PROGRESS NOTES
Called and spoke with son today.  Patient is now down to 3.5 L and alert.    Briefly discussed transtioning to more supportive care .  Will see patient tomorrow.

## 2020-12-01 NOTE — PROGRESS NOTES
Discharge Planning Assessment  [facility]     Patient Name: Carin Yusuf  MRN: [id number]  Today's Date: 12/1/2020    Admit Date: 12/1/2020    Discharge Needs Assessment     Row Name 12/01/20 1150       Living Environment    Lives With  child(can), adult    Name(s) of Who Lives With Patient  Yared Yusuf - Relation: Son - Home: [phone]    Current Living Arrangements  home/apartment/condo    Primary Care Provided by  child(can);self    Provides Primary Care For  no one, unable/limited ability to care for self    Family Caregiver if Needed  child(can), adult    Family Caregiver Names  Yared Yusuf - Relation: Son - Home: [phone]    Quality of Family Relationships  helpful;involved;supportive       Resource/Environmental Concerns    Resource/Environmental Concerns  none    Transportation Concerns  car, none       Transition Planning    Patient/Family Anticipates Transition to  home with help/services    Patient/Family Anticipated Services at Transition  ;rehabilitation services    Transportation Anticipated  health plan transportation       Discharge Needs Assessment    Readmission Within the Last 30 Days  no previous admission in last 30 days    Equipment Currently Used at Home  oxygen;cane, quad;walker, standard;wheelchair;nebulizer;walker, rolling;grab bar;hospital bed    Concerns to be Addressed  discharge planning    Anticipated Changes Related to Illness  none    Discharge Facility/Level of Care Needs  home with home health    Current Discharge Risk  dependent with mobility/activities of daily living;physical impairment;chronically ill        Discharge Plan     Row Name 12/01/20 1152       Plan    Plan  Initial    Plan Comments  CM spoke with son @ bedside. Patient resides in [facility] with her son, Yared who is primary caregiver. Patient is dependent with ADL's. Patient uses a rolling walker for mobility assistance. Patient is on continuous home oxygen @ 3.5 lpm provided by  [employer]. Patient also has a nebulizer machine. Son is interested in home health with [facility] @ discharge for PT/OT. Son states he does not want IP rehab due to fear of COVID 19 at the facilities. CM will continue to follow for any discharge planning needs.    Final Discharge Disposition Code  30 - still a patient        Continued Care and Services - Admitted Since 12/1/2020    Coordination has not been started for this encounter.         Demographic Summary     Row Name 12/01/20 1148       General Information    Admission Type  inpatient    Arrived From  home    Referral Source  emergency department    Reason for Consult  discharge planning    Preferred Language  English     Used During This Interaction  no    General Information Comments  PCP: Hilario Vasques       Contact Information    Contact Information Comments  ParamjitYared - Relation: Son - Home: [phone]        Functional Status     Row Name 12/01/20 1149       Functional Status    Usual Activity Tolerance  poor    Current Activity Tolerance  poor       Functional Status, IADL    Medications  assistive equipment and person    Meal Preparation  assistive equipment and person    Housekeeping  assistive equipment and person    Laundry  assistive equipment and person    Shopping  assistive equipment and person       Mental Status    General Appearance WDL  WDL       Mental Status Summary    Recent Changes in Mental Status/Cognitive Functioning  no changes       Employment/    Employment Status  retired            Kadie Varela, RN

## 2020-12-01 NOTE — H&P
[facility]  HISTORY AND PHYSICAL    Patient Name: Carin Yusuf  : 1940  MRN: [id number]  Primary Care Physician: Hilario Vasques MD  Date of admission: 2020      Subjective   Subjective     Chief Complaint:  Unresponsive episode    HPI:  Carin Yusuf is a 80 y.o. female on chronic 3.5 L O2 with a past medical history of diastolic CHF related to pulmonary hypertension, asthma, CKD 3, hypertension, AND Waldenstrom macroglobinemia / Lymphoplasmacytic lymphoma (recently taken off of treatment due to refractory disease) who was brought to [facility] ED via EMS after syncopal spell at home.  Patient's son states he heard the patient yell out and then immediately a thump, went to the patient's side and she had fallen to the floor it appeared she had tripped over her O2 tubing.  She was unresponsive but breathing and had a pulse, EMS was called and patient remained lethargic on their evaluation therefore got LMA placed for respiratory protection.    Recent increase in lower extremity edema despite PCP increasing her home Lasix from 20 mg to 40 mg.  Compliance with medications.  No recent fevers, rigors, ill contacts.  Mild diarrhea few episodes but not voluminous. No abd pain, N/V.     Upon evaluation in the ED, she was initially intubated due to her presentation however once family arrived and further discussion was had patient has known wishes for DNR/DNI status.  Her oxygenation began to improve, she was reversed and extubated and placed on a nonrebreather.  She has since been able to wean to 4 L and O2 and remains 98%, she is now alert and oriented.          Current COVID Risks are:  [] Fever []  Cough [x] Shortness of breath [] Fatigue [] Change in taste or smell    [] Exposure to COVID positive patient  [] High risk facility   []  NONE    Review of Systems   Gen- No fevers, chills, HA  CV- No chest pain, palpitations, new edema  Resp- (+) THIN MUCOUS  COUGH, green  GI- No N/V/D, abd pain, constipation  Skin - No rash    All other systems reviewed and are negative.     Personal History     Past Medical History:   Diagnosis Date   • Abdominal pressure    • Abnormal laboratory test    • Acute on chronic diastolic CHF (congestive heart failure) (CMS/HCC) 12/1/2020   • Allergic rhinitis    • Asthma    • Chronic kidney disease    • Encounter for screening for cardiovascular disorders    • Essential tremor    • Hypertension    • Lymphoma (CMS/[facility])    • Pneumonia     Cough   • Scoliosis    • Tendonitis        Past Surgical History:   Procedure Laterality Date   • BACK SURGERY      1953   • ENDOSCOPY N/A 1/29/2018    Procedure: ESOPHAGOGASTRODUODENOSCOPY WITH BIOPSY;  Surgeon: Mark I Brunner, MD;  Location:  [facility];  Service:    • ENDOSCOPY N/A 2/9/2018    Procedure: ESOPHAGOGASTRODUODENOSCOPY;  Surgeon: Drew Estrella MD;  Location:  [facility];  Service:    • HEMORRHOIDECTOMY     • HYSTERECTOMY         Family History: family history includes Esophageal cancer in her sister; Heart attack in her father and mother; Hypertension in her son; Stroke in her brother and sister. Otherwise pertinent FHx was reviewed and unremarkable.     Social History:  reports that she has never smoked. She has never used smokeless tobacco. She reports that she does not drink alcohol or use drugs.  Social History     Social History Narrative    Carin Yusuf is a 77 year old white female. She lives with her son in [facility].       Medications:  Available home medication information reviewed.  (Not in a hospital admission)      Allergies   Allergen Reactions   • Bactrim [Sulfamethoxazole-Trimethoprim] Other (See Comments)     unknown   • Sulfa Antibiotics Other (See Comments)     unknown   • Vancomycin Other (See Comments)     unknown   • Zosyn [Piperacillin Sod-Tazobactam So] Other (See Comments)     unknown       Objective   Objective     Vital Signs:   Temp:  [98.3 °F  (36.8 °C)-100 °F (37.8 °C)] 100 °F (37.8 °C)  Heart Rate:  [55-99] 64  Resp:  [22-33] 33  BP: (101-215)/() 134/58  FiO2 (%):  [40 %-100 %] 40 %        Physical Exam   Constitutional: No acute distress, awake, alert, nontoxic, normal body habitus  HEENT: Left supraorbital subQ swelling from fall with early R>L periorbital ecchymosis, mucous membranes moist  Respiratory: Crackles bilaterally L>R with dull left bases, good effort, nonlabored respirations on 4L O2  Cardiovascular: RRR  Gastrointestinal: Soft, nontender, nondistended  Musculoskeletal: 2+ pitting peripheral edema to knees, normal muscle tone for age  Psychiatric: Appropriate affect, good insight and judgement, cooperative  Neurologic: Oriented x 3, movements symmetric BUE and BLE, Cranial Nerves grossly intact, speech clear and fluent      Results Reviewed:  I have personally reviewed most recent indicated data and agree with findings including:  [x]  Laboratory  [x]  Radiology  []  EKG/Telemetry  []  Pathology  []  Cardiac/Vascular Studies  [x]  Old records  []  Other:        LAB RESULTS:  Results from last 7 days   Lab 12/01/20  0749   WBC 4.36   HEMOGLOBIN 7.8*   HEMATOCRIT 28.3*   PLATELETS 136*   NEUTROS ABS 3.14   EOS ABS 0.04   MCV 89.8   PROCALCITONIN 0.23   LACTATE 2.5*     Results from last 7 days   Lab 12/01/20  0749   SODIUM 145   POTASSIUM 4.7   CHLORIDE 95*   CO2 40.0*   ANION GAP 10.0   BUN 42*   CREATININE 2.42*   GLUCOSE 144*   CALCIUM 9.1     Results from last 7 days   Lab 12/01/20  0749   TOTAL PROTEIN 6.7   ALBUMIN 3.50   GLOBULIN 3.2   ALT (SGPT) 8   AST (SGOT) 35*   BILIRUBIN 0.9   ALK PHOS 68     Results from last 7 days   Lab 12/01/20  0749   PROBNP 10,928.0*   TROPONIN T 0.031*         Results from last 7 days   Lab 12/01/20  0749   IRON 32*   IRON SATURATION 10*   TIBC 335   TRANSFERRIN 225   FERRITIN 105.20     Results from last 7 days   Lab 12/01/20  0657   PH, ARTERIAL 7.594*   PCO2, ARTERIAL 41.2   PO2 .0*   FIO2  100   HCO3 ART 39.8*   BASE EXCESS ART 16.7*   CARBOXYHEMOGLOBIN 1.5     Brief Urine Lab Results  (Last result in the past 365 days)      Color   Clarity   Blood   Leuk Est   Nitrite   Protein   CREAT   Urine HCG        12/01/20 0736 Yellow Clear Trace Negative Negative 100 mg/dL (2+)             Microbiology Results (last 10 days)     Procedure Component Value - Date/Time    Respiratory Panel PCR w/COVID-19(SARS-CoV-2) [facility] In-House, NP Swab in UTM/VTM, 3-4 HR TAT - Swab, Nasopharynx [[id number]]  (Normal) Collected: 12/01/20 0752    Lab Status: Final result Specimen: Swab from Nasopharynx Updated: 12/01/20 0959     ADENOVIRUS, PCR Not Detected     Coronavirus 229E Not Detected     Coronavirus HKU1 Not Detected     Coronavirus NL63 Not Detected     Coronavirus OC43 Not Detected     COVID19 Not Detected     Human Metapneumovirus Not Detected     Human Rhinovirus/Enterovirus Not Detected     Influenza A PCR Not Detected     Influenza A H1 Not Detected     Influenza A H1 2009 PCR Not Detected     Influenza A H3 Not Detected     Influenza B PCR Not Detected     Parainfluenza Virus 1 Not Detected     Parainfluenza Virus 2 Not Detected     Parainfluenza Virus 3 Not Detected     Parainfluenza Virus 4 Not Detected     RSV, PCR Not Detected     Bordetella pertussis pcr Not Detected     Bordetella parapertussis PCR Not Detected     Chlamydophila pneumoniae PCR Not Detected     Mycoplasma pneumo by PCR Not Detected    Narrative:      Fact sheet for providers: https://docs.[employer]/wp-content/uploads/LYS7920-4624-ZA9.1-EUA-Provider-Fact-Sheet-3.pdf    Fact sheet for patients: https://docs.[employer]/wp-content/uploads/UXP9765-8192-XR9.1-EUA-Patient-Fact-Sheet-1.pdf        Imaging Results (Last 24 Hours)     Procedure Component Value Units Date/Time    NM Lung Scan Perfusion Particulate [[id number]] Collected: 12/01/20 1359     Updated: 12/01/20 1404    Narrative:      EXAMINATION: NM LUNG SCAN  PERFUSION PARTICULATE-      INDICATION: Respiratory failure, not elsewhere classified      TECHNIQUE: 5.3 mCi of Technetium 99 MAA was injected intravenously.  Perfusion imaging was obtained in the anterior, posterior, oblique, and  lateral projections.     COMPARISON: Chest x-ray performed same day     FINDINGS: There is fairly symmetric perfusion pattern present involving  both lungs without segmental defects to suggest acute pulmonary embolus.  Moderate obscuration due to cardiac enlargement.       Impression:      Low probability for pulmonary embolism.      This report was finalized on 12/1/2020 2:01 PM by Иван Garcia.       XR Hip With or Without Pelvis 2 - 3 View Left [[id number]] Collected: 12/01/20 0812     Updated: 12/01/20 0819    Narrative:      EXAMINATION: XR HIP W OR WO PELVIS 2-3 VIEW LEFT-, XR CHEST 1 VW-      INDICATION: fall      COMPARISON: 9/16/2020     FINDINGS: There is mild diffuse demineralization, likely osteopenia.  Cortical margins are intact, without evidence of acute fracture or  dislocation. Mild arthrosis changes are present.     Chest x-ray demonstrates defibrillator pad present. Endotracheal tube in  good position above the gretchen. No focal airspace consolidation. Mild  cardiac enlargement. No distinct pneumothorax.       Impression:      No acute fracture or dislocation of the right hip and  pelvis.     Endotracheal tube in good position. No evidence of acute disease in the  chest. Mild cardiac enlargement.     This report was finalized on 12/1/2020 8:16 AM by Иван Garcia.       XR Chest 1 View [[id number]] Collected: 12/01/20 0812     Updated: 12/01/20 0819    Narrative:      EXAMINATION: XR HIP W OR WO PELVIS 2-3 VIEW LEFT-, XR CHEST 1 VW-      INDICATION: fall      COMPARISON: 9/16/2020     FINDINGS: There is mild diffuse demineralization, likely osteopenia.  Cortical margins are intact, without evidence of acute fracture or  dislocation. Mild arthrosis changes are  present.     Chest x-ray demonstrates defibrillator pad present. Endotracheal tube in  good position above the gretchen. No focal airspace consolidation. Mild  cardiac enlargement. No distinct pneumothorax.       Impression:      No acute fracture or dislocation of the right hip and  pelvis.     Endotracheal tube in good position. No evidence of acute disease in the  chest. Mild cardiac enlargement.     This report was finalized on 12/1/2020 8:16 AM by Иван Garcia.       CT Head Without Contrast [[id number]] Collected: 12/01/20 0715     Updated: 12/01/20 0717    Narrative:      CT Head WO    HISTORY:   Unresponsive, reportedly with a fall    TECHNIQUE:   Routine noncontrast head CT. Radiation dose reduction techniques included automated exposure control or exposure modulation based on body size. Radiation audit for CT and nuclear cardiology exams in the last 12 months: 0.    COMPARISON:   None.    FINDINGS:       No acute intracranial hemorrhage, mass lesion, or abnormal extra-axial fluid collection. No midline shift or focal mass effect. Ventricular system is normal in size and configuration. Mild generalized atrophy and chronic small vessel disease throughout  the supratentorial white matter..    The gray-white matter differentiation is preserved.    Visualized paranasal sinuses are clear. Visualized mastoid air cells are clear.    No acute osseous abnormality.    Partially visualized endotracheal tube on the  images with secretions in the nasopharynx likely related to recent intubation.      Impression:        1.  No acute intracranial abnormality.    Signer Name: AISHA SHANNON MD   Signed: 12/1/2020 7:15 AM   Workstation Name: [id number]    [facility]        Results for orders placed during the hospital encounter of 10/20/19   Adult Transthoracic Echo Complete W/ Cont if Necessary Per Protocol    Narrative · Left ventricular systolic function is hyperdynamic (EF > 70).  · Mild  tricuspid valve regurgitation is present.  · Estimated right ventricular systolic pressure from tricuspid   regurgitation is markedly elevated (>55 mmHg).  · There is a left pleural effusion.          Assessment/Plan   Assessment & Plan     Active Hospital Problems    Diagnosis POA   • **Acute on chronic respiratory failure with hypoxia (CMS/HCC) [J96.21] Yes   • Unresponsive episode [R41.89] Yes   • YUE (acute kidney injury) (CMS/HCC) [N17.9] Yes   • Anemia, chronic disease [D63.8] Yes   • Acute on chronic diastolic CHF (congestive heart failure) (CMS/HCC) [I50.33] Yes   • Pulmonary hypertension (CMS/HCC) [I27.20] Yes   • Thrombocytopenia (CMS/HCC) [D69.6] Yes   • Chronic renal impairment, stage 3 (moderate) [N18.30] Yes   • Essential hypertension [I10] Yes            • Waldenstrom macroglobulinemia (CMS/HCC) [C88.0] Yes              Unresponsive episode  S/p mechanical fall  - unclear etiology, described as sudden and found with O2 tubing wrapped up in BLE so likely was accidental fall  - watch on tele  - repeat troponin for trend, doubt would be intervention candidate if ACS suspected  - see below     A/C resp failure due to   A/C DHF with severe pulm HTN  - s/p brief intubation in ED now extubated, per family she is a DNR/DNI  - no evidence of pneumonia by imaging, neg procal, and normal WBC  - VQ scan low probability PE  - ECHO (last done Oct 2019 EF 70% and RVSP >55)  - CXR looks vascularized to my read, crackles on exam with pitting edema BLE, elevated BNP --> trial of IV diuresis and recheck am creatinine     YUE  - complicates diuresis but resp status requires mitigation  - follow creatinine trend, hopefully GFR improves with diuresis  - hold off on IVF's as likelihood of intravascular volume related resp complications outweighs clinical picture of volume depletion at present     Waldenstrom macroglobinemia / Lymphoplasmacytic lymphoma  Anemia chronic dz  Chronic TCP  Splenomegaly  - outpatient ibrutinib  has recently been stopped due to refractory disease    HTN  - currently uncontrolled  - likely volume related and diuresis will help    Debility and functional decline  - per family resent declione over ~2 months  - DNR/DNI and recently stopped treating her lymphoma -->  Palliative consult to help with GOC. ??Hospice consideration for home.  -PT/OT/CM planning      DVT prophylaxis:  Lovenox      CODE STATUS:   Code Status and Medical Interventions:   Ordered at: 12/01/20 1043     Limited Support to NOT Include:    Intubation     Code Status:    No CPR     Medical Interventions (Level of Support Prior to Arrest):    Limited       Admission Status:  I believe this patient meets INPATIENT status due to acute resp failure with need for advanced supportive care such as NIPPV.  I feel patient’s risk for adverse outcomes and need for care warrant INPATIENT evaluation and I predict the patient’s care encounter to likely last beyond 2 midnights.      Lacy Gilmore MD  12/01/20

## 2020-12-01 NOTE — CONSULTS
"Hilario Vasques MD  Consulting physician: Lacy Gilmore  Reason for referral: end of life care  Chief Complaint   Patient presents with   • Fall     HPI:   80 y.o. 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  Patient's son states he heard the patient yell out and then immediately a thump, went to the patient's side and she had fallen to the floor it appeared she had tripped over her O2 tubing.  She was unresponsive but breathing and had a pulse, EMS was called and patient remained lethargic on their evaluation therefore got LMA placed for respiratory protection.  Patient was intubated in ED but then extubated after further discussion about patient's known wishes and improvement with oxygenation.   Symptoms:   Patient reports + dyspnea, son, Yared, is main caregiver and reports patient usually receives few albuterol neb Rx daily at home. Patient reported \"pretty short\" with her dyspnea.  + dry mouth.   Patient shared that a breathing treatment would be beneficial for her breathing effort.   No pain, but + bilateral chest wall soreness with persistent cough. Prn tessalon perles effective.    + anxiety \"little nervous\".  Patient agreed if breathing better managed she would feel less anxious.    Able to take po meds at home with applesauce  Advance care planning discussed: yes  Code Status:   Current Code Status     Date Active Code Status Order ID Comments User Context       12/1/2020 1043 No CPR [id number]  Lacy Gilmore MD ED     Advance Care Planning Activity      Questions for Current Code Status     Question Answer Comment    Code Status No CPR     Medical Interventions (Level of Support Prior to Arrest) Limited     Limited Support " to NOT Include Intubation         Advance Directive: none on medical record  Surrogate decision maker: son, Yared Yusuf  Past Medical History:   Diagnosis Date   • Abdominal pressure    • Abnormal laboratory test    • Allergic rhinitis    • Asthma    • Chronic kidney disease    • Encounter for screening for cardiovascular disorders    • Essential tremor    • Hypertension    • Lymphoma (CMS/HCC)    • Pneumonia     Cough   • Scoliosis    • Tendonitis      Past Surgical History:   Procedure Laterality Date   • BACK SURGERY      1953   • ENDOSCOPY N/A 1/29/2018    Procedure: ESOPHAGOGASTRODUODENOSCOPY WITH BIOPSY;  Surgeon: Mark I Brunner, MD;  Location:  [facility];  Service:    • ENDOSCOPY N/A 2/9/2018    Procedure: ESOPHAGOGASTRODUODENOSCOPY;  Surgeon: Drew Estrella MD;  Location:  [facility];  Service:    • HEMORRHOIDECTOMY     • HYSTERECTOMY         Reviewed current scheduled and prn medications for route, type, dose and frequency.    Current Facility-Administered Medications   Medication Dose Route Frequency Provider Last Rate Last Admin   • calcium carbonate (TUMS) chewable tablet 500 mg (200 mg elemental)  2 tablet Oral BID PRN Lacy Gilmore MD       • docusate sodium (COLACE) capsule 100 mg  100 mg Oral BID Lacy Gilmore MD       • enoxaparin (LOVENOX) syringe 30 mg  30 mg Subcutaneous Q24H Lacy Gilmore MD       • levothyroxine (SYNTHROID, LEVOTHROID) tablet 50 mcg  50 mcg Oral Daily Lacy Gilmore MD       • LORazepam (ATIVAN) injection 1 mg  1 mg Intravenous Q4H PRN Lacy Gilmore MD       • morphine injection 1 mg  1 mg Intravenous Q4H PRN Lacy Gilmore MD        And   • naloxone (NARCAN) injection 0.4 mg  0.4 mg Intravenous Q5 Min PRN Lacy Gilmore MD       • ondansetron (ZOFRAN) injection 4 mg  4 mg Intravenous Q6H PRN Lacy Gilmore MD       • propofol (DIPRIVAN) infusion 10 mg/mL 100 mL  5-50 mcg/kg/min Intravenous Titrated Twan Cuevas MD   Stopped at 12/01/20 0909     Current Outpatient  Medications   Medication Sig Dispense Refill   • albuterol (PROVENTIL) (2.5 MG/3ML) 0.083% nebulizer solution USE 1 VIAL IN NEBULIZER EVERY 4 HOURS AS NEEDED FOR WHEEZING. 225 mL 0   • albuterol sulfate  (90 Base) MCG/ACT inhaler INHALE 2 PUFFS BY MOUTH EVERY 6 HOURS AS NEEDED FOR WHEEZING. 18 g 2   • atenolol (TENORMIN) 100 MG tablet TAKE (1) TABLET BY MOUTH EVERY MORNING. 90 tablet 0   • benzonatate (TESSALON) 100 MG capsule Take 1 capsule by mouth 3 (Three) Times a Day As Needed for Cough. 50 capsule 1   • furosemide (LASIX) 40 MG tablet TAKE (1) TABLET BY MOUTH EVERY MORNING. 90 tablet 0   • ipratropium (ATROVENT) 0.06 % nasal spray USE 2 SPRAYS IN EACH NOSTRIL 3 TIMES DAILY AS DIRECTED. 15 mL 2   • levothyroxine (Synthroid) 50 MCG tablet Take 1 tablet by mouth Daily. 90 tablet 0   • losartan (COZAAR) 100 MG tablet TAKE (1) TABLET BY MOUTH EVERY DAY. 90 tablet 0   • potassium chloride (MICRO-K) 8 MEQ CR capsule TAKE (1) CAPSULE BY MOUTH EVERY DAY. 90 capsule 0     propofol, 5-50 mcg/kg/min, Last Rate: Stopped (12/01/20 0909)      •  calcium carbonate  •  LORazepam  •  Morphine **AND** naloxone  •  ondansetron  Allergies   Allergen Reactions   • Bactrim [Sulfamethoxazole-Trimethoprim] Other (See Comments)     unknown   • Sulfa Antibiotics Other (See Comments)     unknown   • Vancomycin Other (See Comments)     unknown   • Zosyn [Piperacillin Sod-Tazobactam So] Other (See Comments)     unknown     Family History   Problem Relation Age of Onset   • Stroke Sister    • Stroke Brother    • Hypertension Son    • Esophageal cancer Sister    • Heart attack Mother    • Heart attack Father      Social History     Socioeconomic History   • Marital status:      Spouse name: Not on file   • Number of children: Not on file   • Years of education: Not on file   • Highest education level: Not on file   Tobacco Use   • Smoking status: Never Smoker   • Smokeless tobacco: Never Used   Substance and Sexual Activity  "  • Alcohol use: No   • Drug use: No   • Sexual activity: Defer   Social History Narrative    Carin Yusuf is a 77 year old white female. She lives with her son in [facility].       Review of Systems - +/- per HPI and symptom review.    PPS: 30%  /59   Pulse 63   Temp 99.5 °F (37.5 °C) (Bladder)   Resp 26   Ht 157.5 cm (62\")   Wt 59.4 kg (131 lb)   SpO2 100%   BMI 23.96 kg/m²   59.4 kg (131 lb) Body mass index is 23.96 kg/m².  Intake & Output (last day)       11/30 0701 - 12/01 0700 12/01 0701 - 12/02 0700    IV Piggyback  1000    Total Intake(mL/kg)  1000 (16.8)    Net  +1000              Physical Exam:  General Appearance: No acute distress, ill appearing  Head: Normocephalic without obvious abnormality, ecchymosis area Right side of face  Eyes: Conjunctivae and sclerae normal, no icterus, NILDA  Throat: No oral lesions, no thrush, oral mucosa dry  Lungs: Clear to auscultation, respirations regular, even shallow and slightly-labored with 32/min  Heart: Regular rhythm and normal rate, normal S1  Abdomen: Normal bowel sounds, soft, non-distended, non-tender  Extremities: Moves all extremities, no redness, no cyanosis, no edema  Pulses: Distal pulses palpable and equal bilaterally  Skin: Warm and dry  Neurological: Awake, answers direct questions, no myoclonus, equal hand  and weak strength    Reviewed labs and diagnostic results.  No results found for: HGBA1C  Results from last 7 days   Lab Units 12/01/20  0749   WBC 10*3/mm3 4.36   HEMOGLOBIN g/dL 7.8*   HEMATOCRIT % 28.3*   PLATELETS 10*3/mm3 136*     Results from last 7 days   Lab Units 12/01/20  0749   SODIUM mmol/L 145   POTASSIUM mmol/L 4.7   CHLORIDE mmol/L 95*   CO2 mmol/L 40.0*   BUN mg/dL 42*   CREATININE mg/dL 2.42*   CALCIUM mg/dL 9.1   BILIRUBIN mg/dL 0.9   ALK PHOS U/L 68   ALT (SGPT) U/L 8   AST (SGOT) U/L 35*   GLUCOSE mg/dL 144*     Results from last 7 days   Lab Units 12/01/20  0749   SODIUM mmol/L 145   POTASSIUM mmol/L 4.7 "   CHLORIDE mmol/L 95*   CO2 mmol/L 40.0*   BUN mg/dL 42*   CREATININE mg/dL 2.42*   GLUCOSE mg/dL 144*   CALCIUM mg/dL 9.1     Imaging Results (Last 72 Hours)     Procedure Component Value Units Date/Time    NM Lung Scan Perfusion Particulate [[id number]] Resulted: 12/01/20 1354     Updated: 12/01/20 1329    XR Hip With or Without Pelvis 2 - 3 View Left [[id number]] Collected: 12/01/20 0812     Updated: 12/01/20 0819    Narrative:      EXAMINATION: XR HIP W OR WO PELVIS 2-3 VIEW LEFT-, XR CHEST 1 VW-      INDICATION: fall      COMPARISON: 9/16/2020     FINDINGS: There is mild diffuse demineralization, likely osteopenia.  Cortical margins are intact, without evidence of acute fracture or  dislocation. Mild arthrosis changes are present.     Chest x-ray demonstrates defibrillator pad present. Endotracheal tube in  good position above the gretchen. No focal airspace consolidation. Mild  cardiac enlargement. No distinct pneumothorax.       Impression:      No acute fracture or dislocation of the right hip and  pelvis.     Endotracheal tube in good position. No evidence of acute disease in the  chest. Mild cardiac enlargement.     This report was finalized on 12/1/2020 8:16 AM by Иван Garcia.       XR Chest 1 View [[id number]] Collected: 12/01/20 0812     Updated: 12/01/20 0819    Narrative:      EXAMINATION: XR HIP W OR WO PELVIS 2-3 VIEW LEFT-, XR CHEST 1 VW-      INDICATION: fall      COMPARISON: 9/16/2020     FINDINGS: There is mild diffuse demineralization, likely osteopenia.  Cortical margins are intact, without evidence of acute fracture or  dislocation. Mild arthrosis changes are present.     Chest x-ray demonstrates defibrillator pad present. Endotracheal tube in  good position above the gretchen. No focal airspace consolidation. Mild  cardiac enlargement. No distinct pneumothorax.       Impression:      No acute fracture or dislocation of the right hip and  pelvis.     Endotracheal tube in good position. No  evidence of acute disease in the  chest. Mild cardiac enlargement.     This report was finalized on 12/1/2020 8:16 AM by Иван Garcia.       CT Head Without Contrast [[id number]] Collected: 12/01/20 0715     Updated: 12/01/20 0717    Narrative:      CT Head WO    HISTORY:   Unresponsive, reportedly with a fall    TECHNIQUE:   Routine noncontrast head CT. Radiation dose reduction techniques included automated exposure control or exposure modulation based on body size. Radiation audit for CT and nuclear cardiology exams in the last 12 months: 0.    COMPARISON:   None.    FINDINGS:       No acute intracranial hemorrhage, mass lesion, or abnormal extra-axial fluid collection. No midline shift or focal mass effect. Ventricular system is normal in size and configuration. Mild generalized atrophy and chronic small vessel disease throughout  the supratentorial white matter..    The gray-white matter differentiation is preserved.    Visualized paranasal sinuses are clear. Visualized mastoid air cells are clear.    No acute osseous abnormality.    Partially visualized endotracheal tube on the  images with secretions in the nasopharynx likely related to recent intubation.      Impression:        1.  No acute intracranial abnormality.    Signer Name: AISHA SHANNON MD   Signed: 12/1/2020 7:15 AM   Workstation Name: [id number]    [facility]        Impression: 80 y.o. female with Waldenstrom macroglobinemia, acute hypoxic respiratory failure  Plan:   Dyspnea - prn morphine, neb Rx - son confirms albuterol neb Rx at home     Cough - prn tessalon perles    Bilateral chest wall pain - trial of lidoderm patches    Goals of care - spoke with son, Yared, at bedside. Discussed about hospice care and reviewed about inpatient hospice care. Yared shared he would like to speak with Dr Alvarez as they had an appointment with her today before proceeding with hospice care. He would like to care for her at  home.    Palliative care team will continue to provide support to pt/home    DANIELLE Ruiz  [phone]  12/01/20  14:00 EST      Time: 60 minutes spent reviewing medical and medication records, assessing and examining patient, discussing with patient and family, answering questions, formulating a plan and documentation of care. > 50% time spent face to face

## 2020-12-01 NOTE — THERAPY EVALUATION
Acute Care - Speech Language Pathology   Swallow Initial Evaluation [facility]   Clinical Swallow Evaluation       Patient Name: Carin Yusuf  : 1940  MRN: [id number]  Today's Date: 2020               Admit Date: 2020    Visit Dx:     ICD-10-CM ICD-9-CM   1. Unresponsive episode  R41.89 780.09   2. Fall in home, initial encounter  W19.XXXA E888.9    Y92.009 E849.0   3. Lactic acidosis  E87.2 276.2   4. Volume depletion  E86.9 276.50   5. Acute on chronic respiratory failure with hypoxia (CMS/HCC)  J96.21 518.84     799.02     Patient Active Problem List   Diagnosis   • Waldenstrom macroglobulinemia (CMS/HCC)   • Gastroesophageal reflux disease   • Essential hypertension   • Scoliosis   • Hereditary essential tremor   • Prediabetes   • Chronic renal impairment, stage 3 (moderate)   • LUQ abdominal pain   • Asthma   • Splenomegaly   • Thrombocytopenia (CMS/HCC)   • Gastric wall thickening   • Pneumonia of left lower lobe due to infectious organism   • Supplemental oxygen dependent   • Acute on chronic respiratory failure with hypoxia (CMS/HCC)   • Unresponsive episode   • YUE (acute kidney injury) (CMS/HCC)   • Anemia, chronic disease   • Acute on chronic diastolic CHF (congestive heart failure) (CMS/HCC)   • Pulmonary hypertension (CMS/HCC)     Past Medical History:   Diagnosis Date   • Abdominal pressure    • Abnormal laboratory test    • Acute on chronic diastolic CHF (congestive heart failure) (CMS/HCC) 2020   • Allergic rhinitis    • Asthma    • Chronic kidney disease    • Encounter for screening for cardiovascular disorders    • Essential tremor    • Hypertension    • Lymphoma (CMS/HCC)    • Pneumonia     Cough   • Scoliosis    • Tendonitis      Past Surgical History:   Procedure Laterality Date   • BACK SURGERY         • ENDOSCOPY N/A 2018    Procedure: ESOPHAGOGASTRODUODENOSCOPY WITH BIOPSY;  Surgeon: Mark I Brunner, MD;  Location: [facility] ENDOSCOPY;  Service:    •  ENDOSCOPY N/A 2/9/2018    Procedure: ESOPHAGOGASTRODUODENOSCOPY;  Surgeon: Drew Estrella MD;  Location: [facility] ENDOSCOPY;  Service:    • HEMORRHOIDECTOMY     • HYSTERECTOMY          SWALLOW EVALUATION (last 72 hours)      SLP Adult Swallow Evaluation     Row Name 12/01/20 1615                   Rehab Evaluation    Document Type  therapy note (daily note)  -DV        Subjective Information  complains of;weakness;fatigue  -DV        Patient Observations  alert;cooperative  -DV        Patient/Family/Caregiver Comments/Observations  son present  -DV        Care Plan Review  evaluation/treatment results reviewed;care plan/treatment goals reviewed;risks/benefits reviewed;current/potential barriers reviewed;patient/other agree to care plan  -DV        Patient Effort  good  -DV           General Information    Patient Profile Reviewed  yes  -DV        Pertinent History Of Current Problem  80yoF sufferred syncopal episode and fell. Was initially intubated in the ED, but extubated within ~1 hour. Pt on chronic O2 at home, hx CHF, pulmonary HTN, asthma, CKD, lymphoma.   -DV        Current Method of Nutrition  regular textures;thin liquids  -DV        Prior Level of Function-Swallowing  other (see comments) difficulty swallowing pills  -DV        Plans/Goals Discussed with  patient;family;agreed upon  -DV        Barriers to Rehab  none identified  -DV        Patient's Goals for Discharge  patient did not state  -DV        Family Goals for Discharge  family did not state  -DV           Pain    Additional Documentation  Pain Scale: Numbers Pre/Post-Treatment (Group)  -DV           Pain Scale: Numbers Pre/Post-Treatment    Pretreatment Pain Rating  0/10 - no pain  -DV        Posttreatment Pain Rating  0/10 - no pain  -DV           Oral Motor Structure and Function    Oral Lesions or Structural Abnormalities and/or variants  dark lingual edema - son reported pt likely bit her tongue when she fell  -DV        Dentition  Assessment  natural, present and adequate  -DV        Secretion Management  WNL/WFL  -DV        Mucosal Quality  moist, healthy  -DV           Oral Musculature and Cranial Nerve Assessment    Oral Motor General Assessment  WFL  -DV           General Eating/Swallowing Observations    Respiratory Support Currently in Use  nasal cannula  -DV        Eating/Swallowing Skills  self-fed  -DV        Positioning During Eating  upright 90 degree;upright in bed  -DV        Utensils Used  cup  -DV        Consistencies Trialed  regular textures;thin liquids  -DV           Respiratory    Respiratory Status  other (see comments) increased resp rate @ baseline and t/o trials  -DV        Respiratory Pattern  decrease control/incoordination of breathing swallow  -DV        Date of Intubation  12/1-12/1  -DV           Clinical Swallow Eval    Oral Prep Phase  WFL  -DV        Oral Residue  WFL  -DV        Pharyngeal Phase  no overt signs/symptoms of pharyngeal impairment  -DV        Esophageal Phase  unremarkable  -DV        Clinical Swallow Evaluation Summary  No overt s/sx aspiration or oral deficits. Pt with fast respiratory rate, but pt with chronic resp issues, on O2 at home. Pt reports difficult swallowing pills at home, but manages by taking with applesauce.   -DV           Clinical Impression    SLP Swallowing Diagnosis  swallow WFL  -DV        Swallow Criteria for Skilled Therapeutic Interventions Met  no problems identified which require skilled intervention  -DV           Recommendations    Therapy Frequency (Swallow)  evaluation only  -DV        SLP Diet Recommendation  regular textures;thin liquids  -DV        Recommended Diagnostics  No further SLP services recommended  -DV        Oral Care Recommendations  Oral Care BID/PRN  -DV        SLP Rec. for Method of Medication Administration  meds whole;with pudding or applesauce;as tolerated  -DV        Monitor for Signs of Aspiration  yes;notify SLP if any concerns  -DV           User Key  (r) = Recorded By, (t) = Taken By, (c) = Cosigned By    Initials Name Effective Dates    Teri Palumbo MS CCC-SLP 02/28/20 -           EDUCATION  The patient has been educated in the following areas:   Dysphagia (Swallowing Impairment).    SLP Recommendation and Plan  SLP Swallowing Diagnosis: swallow WFL  SLP Diet Recommendation: regular textures, thin liquids     SLP Rec. for Method of Medication Administration: meds whole, with pudding or applesauce, as tolerated     Monitor for Signs of Aspiration: yes, notify SLP if any concerns  Recommended Diagnostics: No further SLP services recommended  Swallow Criteria for Skilled Therapeutic Interventions Met: no problems identified which require skilled intervention        Therapy Frequency (Swallow): evaluation only                            Plan of Care Reviewed With: patient, son           Time Calculation:   Time Calculation- SLP     Row Name 12/01/20 1638             Time Calculation- SLP    SLP Start Time  1615  -DV      SLP Received On  12/01/20  -DV        User Key  (r) = Recorded By, (t) = Taken By, (c) = Cosigned By    Initials Name Provider Type    Teri Palumbo MS CCC-SLP Speech and Language Pathologist          Therapy Charges for Today     Code Description Service Date Service Provider Modifiers Qty    [id number] HC ST EVAL ORAL PHARYNG SWALLOW 2 12/1/2020 Teri Vargas MS CCC-SLP GN 1            Patient was not wearing a face mask and did not exhibit coughing during this therapy encounter.  Procedure performed was aerosolizing, did not involve close contact (within 6 feet for at least 15 minutes or longer), and did not involve contact with infectious secretions or specimens.  Therapist used appropriate personal protective equipment including gloves, standard procedure mask and eye protection.  Appropriate PPE was worn during the entire therapy session.  Hand hygiene was completed before and after therapy session.     Teri Vargas MS  CCC-SLP  12/1/2020

## 2020-12-02 NOTE — SIGNIFICANT NOTE
Called and updated son, Yared Yusuf on his mother's condition following the rapid response; per our conversation, our plan is to allow the patient to try to wear the BIPAP if she agrees, however in the past she has not been able to tolerate the BIPAP and the son requests that we honor her wishes if she refuses treatment. The plan per our discussion is that they are waiting to talk to Dr. Alvarez this morning and will pursue Hospice care and comfort measures.    After talking to the son via telephone, the patient was reevaluated and she does not wish to wear the BIPAP. D/w patient's nurse and called back to update her son, Yared. Talked w/ [facility], Brad Lacey and obtained approval for the son to come and visit with his mother in attempts to facilitate decision making and goals of care. The son is planning to come to the hospital at this time.

## 2020-12-02 NOTE — PROGRESS NOTES
Continued Stay Note  [facility]     Patient Name: Carin Yusuf  MRN: [id number]  Today's Date: 12/2/2020    Admit Date: 12/1/2020    Discharge Plan     Row Name 12/02/20 1117       Plan    Plan Comments  Hospice has been consulted. Once goals of care are established CM will make any needed discharge arrangements.    Row Name 12/02/20 1055       Plan    Plan  d    Plan Comments  d        Discharge Codes    No documentation.             Marisa Lyon RN

## 2020-12-02 NOTE — PROGRESS NOTES
Subjective     PROBLEM LIST:  Patient Active Problem List   Diagnosis   • Waldenstrom macroglobulinemia (CMS/HCC)   • Gastroesophageal reflux disease   • Essential hypertension   • Scoliosis   • Hereditary essential tremor   • Prediabetes   • Chronic renal impairment, stage 3 (moderate)   • LUQ abdominal pain   • Asthma   • Splenomegaly   • Thrombocytopenia (CMS/HCC)   • Gastric wall thickening   • Pneumonia of left lower lobe due to infectious organism   • Supplemental oxygen dependent   • Acute on chronic respiratory failure with hypoxia (CMS/HCC)   • Unresponsive episode   • YUE (acute kidney injury) (CMS/HCC)   • Anemia, chronic disease   • Acute on chronic diastolic CHF (congestive heart failure) (CMS/HCC)   • Pulmonary hypertension (CMS/HCC)         INTERVAL HISTORY: patient sleeping and does not wake,  Son at bedside      REVIEW OF SYSTEMS:  A 14 point review of systems was performed and is negative except as noted above.      Objective     Vitals:    12/01/20 2005 12/01/20 2336 12/02/20 0353 12/02/20 0713   BP: 128/59 113/60 122/53 114/42   BP Location: Right arm Right arm Right arm Right arm   Patient Position: Lying Lying Lying Lying   Pulse:    57   Resp: 16 18 16 16   Temp: 98.5 °F (36.9 °C) 98 °F (36.7 °C) 96.9 °F (36.1 °C) 97.8 °F (36.6 °C)   TempSrc: Oral Axillary Oral Oral   SpO2:    96%   Weight:       Height:           Performance Status: 4  Appears comfortable, regular respirations, no response to voice or touch.        Labs:       WBC   Date Value Ref Range Status   12/02/2020 4.17 3.40 - 10.80 10*3/mm3 Final     RBC   Date Value Ref Range Status   12/02/2020 3.07 (L) 3.77 - 5.28 10*6/mm3 Final     Hemoglobin   Date Value Ref Range Status   12/02/2020 7.8 (L) 12.0 - 15.9 g/dL Final     Hematocrit   Date Value Ref Range Status   12/02/2020 29.6 (L) 34.0 - 46.6 % Final     MCV   Date Value Ref Range Status   12/02/2020 96.4 79.0 - 97.0 fL Final     MCH   Date Value Ref Range Status   12/02/2020  25.4 (L) 26.6 - 33.0 pg Final     MCHC   Date Value Ref Range Status   12/02/2020 26.4 (L) 31.5 - 35.7 g/dL Final     RDW   Date Value Ref Range Status   12/02/2020 15.5 (H) 12.3 - 15.4 % Final     RDW-SD   Date Value Ref Range Status   12/02/2020 54.1 (H) 37.0 - 54.0 fl Final     MPV   Date Value Ref Range Status   12/02/2020 9.9 6.0 - 12.0 fL Final     Platelets   Date Value Ref Range Status   12/02/2020 133 (L) 140 - 450 10*3/mm3 Final     Neutrophil %   Date Value Ref Range Status   12/02/2020 68.4 42.7 - 76.0 % Final     Lymphocyte %   Date Value Ref Range Status   12/02/2020 23.0 19.6 - 45.3 % Final     Monocyte %   Date Value Ref Range Status   12/02/2020 7.2 5.0 - 12.0 % Final     Eosinophil %   Date Value Ref Range Status   12/02/2020 0.2 (L) 0.3 - 6.2 % Final     Basophil %   Date Value Ref Range Status   12/02/2020 0.2 0.0 - 1.5 % Final     Immature Grans %   Date Value Ref Range Status   12/02/2020 1.0 (H) 0.0 - 0.5 % Final     Neutrophils, Absolute   Date Value Ref Range Status   12/02/2020 2.85 1.70 - 7.00 10*3/mm3 Final     Lymphocytes, Absolute   Date Value Ref Range Status   12/02/2020 0.96 0.70 - 3.10 10*3/mm3 Final     Monocytes, Absolute   Date Value Ref Range Status   12/02/2020 0.30 0.10 - 0.90 10*3/mm3 Final     Eosinophils, Absolute   Date Value Ref Range Status   12/02/2020 0.01 0.00 - 0.40 10*3/mm3 Final     Basophils, Absolute   Date Value Ref Range Status   12/02/2020 0.01 0.00 - 0.20 10*3/mm3 Final     Immature Grans, Absolute   Date Value Ref Range Status   12/02/2020 0.04 0.00 - 0.05 10*3/mm3 Final     nRBC   Date Value Ref Range Status   12/02/2020 0.0 0.0 - 0.2 /100 WBC Final       Glucose   Date Value Ref Range Status   12/02/2020 103 (H) 65 - 99 mg/dL Final     BUN   Date Value Ref Range Status   12/02/2020 43 (H) 8 - 23 mg/dL Final     Creatinine   Date Value Ref Range Status   12/02/2020 2.21 (H) 0.57 - 1.00 mg/dL Final     Sodium   Date Value Ref Range Status   12/02/2020 144  136 - 145 mmol/L Final     Potassium   Date Value Ref Range Status   12/02/2020 5.1 3.5 - 5.2 mmol/L Final     Chloride   Date Value Ref Range Status   12/02/2020 98 98 - 107 mmol/L Final     CO2   Date Value Ref Range Status   12/02/2020 39.0 (H) 22.0 - 29.0 mmol/L Final     Calcium   Date Value Ref Range Status   12/02/2020 8.3 (L) 8.6 - 10.5 mg/dL Final     Total Protein   Date Value Ref Range Status   12/02/2020 6.3 6.0 - 8.5 g/dL Final     Albumin   Date Value Ref Range Status   12/02/2020 3.10 (L) 3.50 - 5.20 g/dL Final     ALT (SGPT)   Date Value Ref Range Status   12/02/2020 6 1 - 33 U/L Final     AST (SGOT)   Date Value Ref Range Status   12/02/2020 20 1 - 32 U/L Final     Alkaline Phosphatase   Date Value Ref Range Status   12/02/2020 61 39 - 117 U/L Final     Total Bilirubin   Date Value Ref Range Status   12/02/2020 0.7 0.0 - 1.2 mg/dL Final     eGFR Non  Amer   Date Value Ref Range Status   12/02/2020 21 (L) >60 mL/min/1.73 Final     BUN/Creatinine Ratio   Date Value Ref Range Status   12/02/2020 19.5 7.0 - 25.0 Final     Anion Gap   Date Value Ref Range Status   12/02/2020 7.0 5.0 - 15.0 mmol/L Final       No results found for: CEA            Assessment/Plan     Carin Yusuf is a 80 y.o. year old female with waldenstrom's macroglobulinemia admitted after a fall and with hypercapnic respiratory failure.    I spoke with Mrs. Yusuf over the phone a few weeks ago about transitioning to hospice care.  This is been based on her gradual decline over the past year and increasing respiratory issues.  She wanted to proceed with comfort care but just wanted to wait to discuss with her son before making any definite decisions.  She now was admitted after a fall at home and I think that hospice care would be consistent with what she had told me previously.    I discussed with her son that her lymphoma has been showing evidence of progression based on her labs.  I do not think it is the predominant  cause of her respiratory failure but it certainly is contributing to her overall decline and difficulty in fighting infections.    I think she is appropriate for inpatient hospice.           Kasey Alvarez MD  12/2/2020

## 2020-12-02 NOTE — PLAN OF CARE
Goal Outcome Evaluation:  Plan of Care Reviewed With: devendra  Progress: declining  Outcome Summary: Palliative consult for comfort care, support to pt and family; seen by DALE Hansen APRN on 12/1/2020. Pt declining overnight, symptomatic this morning but relieved with morphine and ativan. Devendra Vail at bedside, would prefer to take pt home for hospice care, but wishes to confer with Dr. Alvarez before making any transition. Hospice consult in, Liaison following. Palliative following for symptom management, support to family.    Palliative Team Conference: AMANDO Clemons RN, PN; DANIELLE Guillermo    Problem: Palliative Care  Goal: Enhanced Quality of Life  Outcome: Ongoing, Progressing  Intervention: Maximize Comfort  Flowsheets (Taken 12/2/2020 1332)  Pain Management Interventions: pain management plan reviewed with patient/caregiver  Intervention: Optimize Function  Flowsheets (Taken 12/2/2020 1332)  Sensory Stimulation Regulation: quiet environment promoted  Intervention: Promote Advance Care Planning  Flowsheets (Taken 12/2/2020 1332)  Life Transition/Adjustment:   palliative care discussed   palliative care initiated  Intervention: Optimize Psychosocial Wellbeing  Flowsheets (Taken 12/2/2020 1332)  Spiritual Activities Assistance: (Spiritual Care consult) other (see comments)  Family/Support System Care:   caregiver stress acknowledged   involvement promoted   presence promoted   self-care encouraged   support provided

## 2020-12-02 NOTE — DISCHARGE PLACEMENT REQUEST
"Carin Hill (80 y.o. Female)     Date of Birth Social Security Number Address Home Phone MRN    1940  [address] [place] [address] [phone] [id number]    M[employer]on Marital Status          Non-Samaritan        Admission Date Admission Type Admitting Provider Attending Provider Department, Room/Bed    12/1/20 Emergency Onelia Whitley DO [place], Sarah Kathleen, DO [facility] 3E, S347/1    Discharge Date Discharge Disposition Discharge Destination                       Attending Provider: Onelia Whitley DO    Allergies: Bactrim [Sulfamethoxazole-trimethoprim], Sulfa Antibiotics, Vancomycin, Zosyn [Piperacillin Sod-tazobactam So]    Isolation: None   Infection: None   Code Status: No CPR    Ht: 157.5 cm (62\")   Wt: 59.4 kg (131 lb)    Admission Cmt: None   Principal Problem: Acute on chronic respiratory failure with hypoxia (CMS/[facility]) [J96.21]                 Active Insurance as of 12/1/2020     Primary Coverage     Payor Plan Insurance Group Employer/Plan Group    MEDICARE MEDICARE A & B      Payor Plan Address Payor Plan Phone Number Payor Plan Fax Number Effective Dates    [address] [phone]  2/1/1990 - None Entered    [place] [address]       Subscriber Name Subscriber Birth Date Member ID       CARIN HILL 1940 [id number]                 Emergency Contacts      (Rel.) Home Phone Work Phone Mobile Phone    Yared Hill (Son) [phone] -- --            Emergency Contact Information     Name Relation Home Work Mobile    Yared Hill Son [phone]            Insurance Information                MEDICARE/MEDICARE A & B Phone: [phone]    Subscriber: Carin Hill Subscriber#: [id number]    Group#:  Precert#:              History & Physical      Lacy Gilmore MD at 12/01/20 1431              [facility] Medicine Services  HISTORY AND PHYSICAL    Patient " Name: Carin Yusuf  : 1940  MRN: [id number]  Primary Care Physician: Hilario Vasques MD  Date of admission: 2020      Subjective   Subjective     Chief Complaint:  Unresponsive episode    HPI:  Carin Yusuf is a 80 y.o. 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  Patient's son states he heard the patient yell out and then immediately a thump, went to the patient's side and she had fallen to the floor it appeared she had tripped over her O2 tubing.  She was unresponsive but breathing and had a pulse, EMS was called and patient remained lethargic on their evaluation therefore got LMA placed for respiratory protection.    Recent increase in lower extremity edema despite PCP increasing her home Lasix from 20 mg to 40 mg.  Compliance with medications.  No recent fevers, rigors, ill contacts.  Mild diarrhea few episodes but not voluminous. No abd pain, N/V.     Upon evaluation in the ED, she was initially intubated due to her presentation however once family arrived and further discussion was had patient has known wishes for DNR/DNI status.  Her oxygenation began to improve, she was reversed and extubated and placed on a nonrebreather.  She has since been able to wean to 4 L and O2 and remains 98%, she is now alert and oriented.          Current COVID Risks are:  [] Fever []  Cough [x] Shortness of breath [] Fatigue [] Change in taste or smell    [] Exposure to COVID positive patient  [] High risk facility   []  NONE    Review of Systems   Gen- No fevers, chills, HA  CV- No chest pain, palpitations, new edema  Resp- (+) THIN MUCOUS COUGH, green  GI- No N/V/D, abd pain, constipation  Skin - No rash    All other systems reviewed  and are negative.     Personal History     Past Medical History:   Diagnosis Date   • Abdominal pressure    • Abnormal laboratory test    • Acute on chronic diastolic CHF (congestive heart failure) (CMS/HCC) 12/1/2020   • Allergic rhinitis    • Asthma    • Chronic kidney disease    • Encounter for screening for cardiovascular disorders    • Essential tremor    • Hypertension    • Lymphoma (CMS/HCC)    • Pneumonia     Cough   • Scoliosis    • Tendonitis        Past Surgical History:   Procedure Laterality Date   • BACK SURGERY      1953   • ENDOSCOPY N/A 1/29/2018    Procedure: ESOPHAGOGASTRODUODENOSCOPY WITH BIOPSY;  Surgeon: Mark I Brunner, MD;  Location: [facility] ENDOSCOPY;  Service:    • ENDOSCOPY N/A 2/9/2018    Procedure: ESOPHAGOGASTRODUODENOSCOPY;  Surgeon: Drew Estrella MD;  Location: [facility] ENDOSCOPY;  Service:    • HEMORRHOIDECTOMY     • HYSTERECTOMY         Family History: family history includes Esophageal cancer in her sister; Heart attack in her father and mother; Hypertension in her son; Stroke in her brother and sister. Otherwise pertinent FHx was reviewed and unremarkable.     Social History:  reports that she has never smoked. She has never used smokeless tobacco. She reports that she does not drink alcohol or use drugs.  Social History     Social History Narrative    Carin Yusuf is a 77 year old white female. She lives with her son in [facility].       Medications:  Available home medication information reviewed.  (Not in a hospital admission)      Allergies   Allergen Reactions   • Bactrim [Sulfamethoxazole-Trimethoprim] Other (See Comments)     unknown   • Sulfa Antibiotics Other (See Comments)     unknown   • Vancomycin Other (See Comments)     unknown   • Zosyn [Piperacillin Sod-Tazobactam So] Other (See Comments)     unknown       Objective   Objective     Vital Signs:   Temp:  [98.3 °F (36.8 °C)-100 °F (37.8 °C)] 100 °F (37.8 °C)  Heart Rate:  [55-99] 64  Resp:  [22-33] 33  BP:  (101-215)/() 134/58  FiO2 (%):  [40 %-100 %] 40 %        Physical Exam   Constitutional: No acute distress, awake, alert, nontoxic, normal body habitus  HEENT: Left supraorbital subQ swelling from fall with early R>L periorbital ecchymosis, mucous membranes moist  Respiratory: Crackles bilaterally L>R with dull left bases, good effort, nonlabored respirations on 4L O2  Cardiovascular: RRR  Gastrointestinal: Soft, nontender, nondistended  Musculoskeletal: 2+ pitting peripheral edema to knees, normal muscle tone for age  Psychiatric: Appropriate affect, good insight and judgement, cooperative  Neurologic: Oriented x 3, movements symmetric BUE and BLE, Cranial Nerves grossly intact, speech clear and fluent      Results Reviewed:  I have personally reviewed most recent indicated data and agree with findings including:  [x]  Laboratory  [x]  Radiology  []  EKG/Telemetry  []  Pathology  []  Cardiac/Vascular Studies  [x]  Old records  []  Other:        LAB RESULTS:  Results from last 7 days   Lab 12/01/20  0749   WBC 4.36   HEMOGLOBIN 7.8*   HEMATOCRIT 28.3*   PLATELETS 136*   NEUTROS ABS 3.14   EOS ABS 0.04   MCV 89.8   PROCALCITONIN 0.23   LACTATE 2.5*     Results from last 7 days   Lab 12/01/20  0749   SODIUM 145   POTASSIUM 4.7   CHLORIDE 95*   CO2 40.0*   ANION GAP 10.0   BUN 42*   CREATININE 2.42*   GLUCOSE 144*   CALCIUM 9.1     Results from last 7 days   Lab 12/01/20  0749   TOTAL PROTEIN 6.7   ALBUMIN 3.50   GLOBULIN 3.2   ALT (SGPT) 8   AST (SGOT) 35*   BILIRUBIN 0.9   ALK PHOS 68     Results from last 7 days   Lab 12/01/20  0749   PROBNP 10,928.0*   TROPONIN T 0.031*         Results from last 7 days   Lab 12/01/20  0749   IRON 32*   IRON SATURATION 10*   TIBC 335   TRANSFERRIN 225   FERRITIN 105.20     Results from last 7 days   Lab 12/01/20  0657   PH, ARTERIAL 7.594*   PCO2, ARTERIAL 41.2   PO2 .0*   FIO2 100   HCO3 ART 39.8*   BASE EXCESS ART 16.7*   CARBOXYHEMOGLOBIN 1.5     Brief Urine Lab  Results  (Last result in the past 365 days)      Color   Clarity   Blood   Leuk Est   Nitrite   Protein   CREAT   Urine HCG        12/01/20 0736 Yellow Clear Trace Negative Negative 100 mg/dL (2+)             Microbiology Results (last 10 days)     Procedure Component Value - Date/Time    Respiratory Panel PCR w/COVID-19(SARS-CoV-2) DESI/TANA/BOYD/PAD/COR/MAD/KAREN In-House, NP Swab in UTM/VTM, 3-4 HR TAT - Swab, Nasopharynx [[id number]]  (Normal) Collected: 12/01/20 0752    Lab Status: Final result Specimen: Swab from Nasopharynx Updated: 12/01/20 0959     ADENOVIRUS, PCR Not Detected     Coronavirus 229E Not Detected     Coronavirus HKU1 Not Detected     Coronavirus NL63 Not Detected     Coronavirus OC43 Not Detected     COVID19 Not Detected     Human Metapneumovirus Not Detected     Human Rhinovirus/Enterovirus Not Detected     Influenza A PCR Not Detected     Influenza A H1 Not Detected     Influenza A H1 2009 PCR Not Detected     Influenza A H3 Not Detected     Influenza B PCR Not Detected     Parainfluenza Virus 1 Not Detected     Parainfluenza Virus 2 Not Detected     Parainfluenza Virus 3 Not Detected     Parainfluenza Virus 4 Not Detected     RSV, PCR Not Detected     Bordetella pertussis pcr Not Detected     Bordetella parapertussis PCR Not Detected     Chlamydophila pneumoniae PCR Not Detected     Mycoplasma pneumo by PCR Not Detected    Narrative:      Fact sheet for providers: https://docs.[employer]/wp-content/uploads/JSG4943-3622-NQ7.1-EUA-Provider-Fact-Sheet-3.pdf    Fact sheet for patients: https://docs.[employer]/wp-content/uploads/IMO1922-8884-PP2.1-EUA-Patient-Fact-Sheet-1.pdf        Imaging Results (Last 24 Hours)     Procedure Component Value Units Date/Time    NM Lung Scan Perfusion Particulate [[id number]] Collected: 12/01/20 1359     Updated: 12/01/20 1404    Narrative:      EXAMINATION: NM LUNG SCAN PERFUSION PARTICULATE-      INDICATION: Respiratory failure, not elsewhere classified         TECHNIQUE: 5.3 mCi of Technetium 99 MAA was injected intravenously.  Perfusion imaging was obtained in the anterior, posterior, oblique, and  lateral projections.     COMPARISON: Chest x-ray performed same day     FINDINGS: There is fairly symmetric perfusion pattern present involving  both lungs without segmental defects to suggest acute pulmonary embolus.  Moderate obscuration due to cardiac enlargement.       Impression:      Low probability for pulmonary embolism.      This report was finalized on 12/1/2020 2:01 PM by Иван Garcia.       XR Hip With or Without Pelvis 2 - 3 View Left [[id number]] Collected: 12/01/20 0812     Updated: 12/01/20 0819    Narrative:      EXAMINATION: XR HIP W OR WO PELVIS 2-3 VIEW LEFT-, XR CHEST 1 VW-      INDICATION: fall      COMPARISON: 9/16/2020     FINDINGS: There is mild diffuse demineralization, likely osteopenia.  Cortical margins are intact, without evidence of acute fracture or  dislocation. Mild arthrosis changes are present.     Chest x-ray demonstrates defibrillator pad present. Endotracheal tube in  good position above the gretchen. No focal airspace consolidation. Mild  cardiac enlargement. No distinct pneumothorax.       Impression:      No acute fracture or dislocation of the right hip and  pelvis.     Endotracheal tube in good position. No evidence of acute disease in the  chest. Mild cardiac enlargement.     This report was finalized on 12/1/2020 8:16 AM by Иван Garcia.       XR Chest 1 View [[id number]] Collected: 12/01/20 0812     Updated: 12/01/20 0819    Narrative:      EXAMINATION: XR HIP W OR WO PELVIS 2-3 VIEW LEFT-, XR CHEST 1 VW-      INDICATION: fall      COMPARISON: 9/16/2020     FINDINGS: There is mild diffuse demineralization, likely osteopenia.  Cortical margins are intact, without evidence of acute fracture or  dislocation. Mild arthrosis changes are present.     Chest x-ray demonstrates defibrillator pad present. Endotracheal tube in  good  position above the gretchen. No focal airspace consolidation. Mild  cardiac enlargement. No distinct pneumothorax.       Impression:      No acute fracture or dislocation of the right hip and  pelvis.     Endotracheal tube in good position. No evidence of acute disease in the  chest. Mild cardiac enlargement.     This report was finalized on 12/1/2020 8:16 AM by Иван Garcia.       CT Head Without Contrast [[id number]] Collected: 12/01/20 0715     Updated: 12/01/20 0717    Narrative:      CT Head WO    HISTORY:   Unresponsive, reportedly with a fall    TECHNIQUE:   Routine noncontrast head CT. Radiation dose reduction techniques included automated exposure control or exposure modulation based on body size. Radiation audit for CT and nuclear cardiology exams in the last 12 months: 0.    COMPARISON:   None.    FINDINGS:       No acute intracranial hemorrhage, mass lesion, or abnormal extra-axial fluid collection. No midline shift or focal mass effect. Ventricular system is normal in size and configuration. Mild generalized atrophy and chronic small vessel disease throughout  the supratentorial white matter..    The gray-white matter differentiation is preserved.    Visualized paranasal sinuses are clear. Visualized mastoid air cells are clear.    No acute osseous abnormality.    Partially visualized endotracheal tube on the  images with secretions in the nasopharynx likely related to recent intubation.      Impression:        1.  No acute intracranial abnormality.    Signer Name: AISHA SHANNON MD   Signed: 12/1/2020 7:15 AM   Workstation Name: [id number]    [facility]        Results for orders placed during the hospital encounter of 10/20/19   Adult Transthoracic Echo Complete W/ Cont if Necessary Per Protocol    Narrative · Left ventricular systolic function is hyperdynamic (EF > 70).  · Mild tricuspid valve regurgitation is present.  · Estimated right ventricular systolic pressure from  tricuspid   regurgitation is markedly elevated (>55 mmHg).  · There is a left pleural effusion.          Assessment/Plan   Assessment & Plan     Active Hospital Problems    Diagnosis POA   • **Acute on chronic respiratory failure with hypoxia (CMS/HCC) [J96.21] Yes   • Unresponsive episode [R41.89] Yes   • YUE (acute kidney injury) (CMS/HCC) [N17.9] Yes   • Anemia, chronic disease [D63.8] Yes   • Acute on chronic diastolic CHF (congestive heart failure) (CMS/[facility]) [I50.33] Yes   • Pulmonary hypertension (CMS/HCC) [I27.20] Yes   • Thrombocytopenia (CMS/[facility]) [D69.6] Yes   • Chronic renal impairment, stage 3 (moderate) [N18.30] Yes   • Essential hypertension [I10] Yes            • Waldenstrom macroglobulinemia (CMS/[facility]) [C88.0] Yes              Unresponsive episode  S/p mechanical fall  - unclear etiology, described as sudden and found with O2 tubing wrapped up in BLE so likely was accidental fall  - watch on tele  - repeat troponin for trend, doubt would be intervention candidate if ACS suspected  - see below     A/C resp failure due to   A/C DHF with severe pulm HTN  - s/p brief intubation in ED now extubated, per family she is a DNR/DNI  - no evidence of pneumonia by imaging, neg procal, and normal WBC  - VQ scan low probability PE  - ECHO (last done Oct 2019 EF 70% and RVSP >55)  - CXR looks vascularized to my read, crackles on exam with pitting edema BLE, elevated BNP --> trial of IV diuresis and recheck am creatinine     YUE  - complicates diuresis but resp status requires mitigation  - follow creatinine trend, hopefully GFR improves with diuresis  - hold off on IVF's as likelihood of intravascular volume related resp complications outweighs clinical picture of volume depletion at present     Waldenstrom macroglobinemia / Lymphoplasmacytic lymphoma  Anemia chronic dz  Chronic TCP  Splenomegaly  - outpatient ibrutinib has recently been stopped due to refractory disease    HTN  - currently uncontrolled  - likely  volume related and diuresis will help    Debility and functional decline  - per family resent declione over ~2 months  - DNR/DNI and recently stopped treating her lymphoma -->  Palliative consult to help with GOC. ??Hospice consideration for home.  -PT/OT/CM planning      DVT prophylaxis:  Lovenox      CODE STATUS:   Code Status and Medical Interventions:   Ordered at: 12/01/20 1043     Limited Support to NOT Include:    Intubation     Code Status:    No CPR     Medical Interventions (Level of Support Prior to Arrest):    Limited       Admission Status:  I believe this patient meets INPATIENT status due to acute resp failure with need for advanced supportive care such as NIPPV.  I feel patient’s risk for adverse outcomes and need for care warrant INPATIENT evaluation and I predict the patient’s care encounter to likely last beyond 2 midnights.      Lacy Gilmore MD  12/01/20      Electronically signed by Lacy Gilmore MD at 12/01/20 3369

## 2020-12-02 NOTE — SIGNIFICANT NOTE
[facility] Medicine Services  CRITICAL CARE NOTE    Patient Name: Carin Yusuf  : 1940  MRN: [id number]    Date of Admission: 2020  Length of Stay: 1    Subjective     Event/HPI:  RRT called for decreased alertness, facial and upper extremity twitching; per nurse report this is a significant change in mental status from earlier assessment. Patient was admitted today after an unresponsive episode s/p fall at home earlier today; she was briefly intubated in the ER but subsequently extubated, per son patient is DNR/DNI.     Objective     Vital Signs:   Temp:  [98 °F (36.7 °C)-100 °F (37.8 °C)] 98 °F (36.7 °C)  Heart Rate:  [55-99] 64  Resp:  [16-33] 18  BP: (101-215)/() 113/60  FiO2 (%):  [40 %-100 %] 40 %       Pertinent Physical Exam:  Constitutional: drowsy, lying in bed w/ eyes closed, HOB elevated; will weakly answer questions and follows commands  Eyes: PERRLA, sclerae anicteric, no conjunctival injection  HENT: mucous membranes dry, pale  Neck: trachea midline  Respiratory: Clear to auscultation bilaterally, nonlabored respirations, shallow respirations  Cardiovascular: bradycardic, palpable pedal pulses bilaterally, LEESA noted   Gastrointestinal: hypo bowel sounds, soft, nontender, nondistended  Musculoskeletal: 1+ bilateral ankle edema, no cyanosis to extremities  Psychiatric: flat affect, cooperative  Neurologic: Oriented x 3, although drowsy she is able to tell me she is in the hospital, it is 2020 and Perlita is the president;  Generalized weakness; strength symmetric in all extremities, speech weak, quiet and slightly garbled    Results Reviewed:  I have personally reviewed current lab, radiology, and data and agree.    Results from last 7 days   Lab Units 20  0207 20  0749   WBC 10*3/mm3 4.17 4.36   HEMOGLOBIN g/dL 7.8* 7.8*   HEMATOCRIT % 29.6* 28.3*   PLATELETS 10*3/mm3 133* 136*     Results from last 7 days   Lab Units 20  0760     SODIUM mmol/L 145   POTASSIUM mmol/L 4.7   CHLORIDE mmol/L 95*   CO2 mmol/L 40.0*   BUN mg/dL 42*   CREATININE mg/dL 2.42*   GLUCOSE mg/dL 144*   CALCIUM mg/dL 9.1   ALT (SGPT) U/L 8   AST (SGOT) U/L 35*     No results found for: BNP  pH, Arterial   Date Value Ref Range Status   12/02/2020 7.223 (L) 7.350 - 7.450 pH units Final     Comment:     84 Value below reference range       Microbiology Results Abnormal     Procedure Component Value - Date/Time    Respiratory Panel PCR w/COVID-19(SARS-CoV-2) DESI/TANA/BOYD/PAD/COR/MAD/KAREN In-House, NP Swab in UTM/VTM, 3-4 HR TAT - Swab, Nasopharynx [[id number]]  (Normal) Collected: 12/01/20 0752    Lab Status: Final result Specimen: Swab from Nasopharynx Updated: 12/01/20 0959     ADENOVIRUS, PCR Not Detected     Coronavirus 229E Not Detected     Coronavirus HKU1 Not Detected     Coronavirus NL63 Not Detected     Coronavirus OC43 Not Detected     COVID19 Not Detected     Human Metapneumovirus Not Detected     Human Rhinovirus/Enterovirus Not Detected     Influenza A PCR Not Detected     Influenza A H1 Not Detected     Influenza A H1 2009 PCR Not Detected     Influenza A H3 Not Detected     Influenza B PCR Not Detected     Parainfluenza Virus 1 Not Detected     Parainfluenza Virus 2 Not Detected     Parainfluenza Virus 3 Not Detected     Parainfluenza Virus 4 Not Detected     RSV, PCR Not Detected     Bordetella pertussis pcr Not Detected     Bordetella parapertussis PCR Not Detected     Chlamydophila pneumoniae PCR Not Detected     Mycoplasma pneumo by PCR Not Detected    Narrative:      Fact sheet for providers: https://docs.[employer]/wp-content/uploads/NML9970-5864-WT9.1-EUA-Provider-Fact-Sheet-3.pdf    Fact sheet for patients: https://docs.[employer]/wp-content/uploads/MIK1677-4425-ZH5.1-EUA-Patient-Fact-Sheet-1.pdf          Imaging Results (Last 24 Hours)     Procedure Component Value Units Date/Time    NM Lung Scan Perfusion Particulate [[id number]] Collected:  12/01/20 1359     Updated: 12/01/20 1404    Narrative:      EXAMINATION: NM LUNG SCAN PERFUSION PARTICULATE-      INDICATION: Respiratory failure, not elsewhere classified      TECHNIQUE: 5.3 mCi of Technetium 99 MAA was injected intravenously.  Perfusion imaging was obtained in the anterior, posterior, oblique, and  lateral projections.     COMPARISON: Chest x-ray performed same day     FINDINGS: There is fairly symmetric perfusion pattern present involving  both lungs without segmental defects to suggest acute pulmonary embolus.  Moderate obscuration due to cardiac enlargement.       Impression:      Low probability for pulmonary embolism.      This report was finalized on 12/1/2020 2:01 PM by Иван Garcia.       XR Hip With or Without Pelvis 2 - 3 View Left [[id number]] Collected: 12/01/20 0812     Updated: 12/01/20 0819    Narrative:      EXAMINATION: XR HIP W OR WO PELVIS 2-3 VIEW LEFT-, XR CHEST 1 VW-      INDICATION: fall      COMPARISON: 9/16/2020     FINDINGS: There is mild diffuse demineralization, likely osteopenia.  Cortical margins are intact, without evidence of acute fracture or  dislocation. Mild arthrosis changes are present.     Chest x-ray demonstrates defibrillator pad present. Endotracheal tube in  good position above the gretchen. No focal airspace consolidation. Mild  cardiac enlargement. No distinct pneumothorax.       Impression:      No acute fracture or dislocation of the right hip and  pelvis.     Endotracheal tube in good position. No evidence of acute disease in the  chest. Mild cardiac enlargement.     This report was finalized on 12/1/2020 8:16 AM by Иван Garcia.       XR Chest 1 View [[id number]] Collected: 12/01/20 0812     Updated: 12/01/20 0819    Narrative:      EXAMINATION: XR HIP W OR WO PELVIS 2-3 VIEW LEFT-, XR CHEST 1 VW-      INDICATION: fall      COMPARISON: 9/16/2020     FINDINGS: There is mild diffuse demineralization, likely osteopenia.  Cortical margins are  intact, without evidence of acute fracture or  dislocation. Mild arthrosis changes are present.     Chest x-ray demonstrates defibrillator pad present. Endotracheal tube in  good position above the gretchen. No focal airspace consolidation. Mild  cardiac enlargement. No distinct pneumothorax.       Impression:      No acute fracture or dislocation of the right hip and  pelvis.     Endotracheal tube in good position. No evidence of acute disease in the  chest. Mild cardiac enlargement.     This report was finalized on 12/1/2020 8:16 AM by Иван Garcai.       CT Head Without Contrast [[id number]] Collected: 12/01/20 0715     Updated: 12/01/20 0717    Narrative:      CT Head WO    HISTORY:   Unresponsive, reportedly with a fall    TECHNIQUE:   Routine noncontrast head CT. Radiation dose reduction techniques included automated exposure control or exposure modulation based on body size. Radiation audit for CT and nuclear cardiology exams in the last 12 months: 0.    COMPARISON:   None.    FINDINGS:       No acute intracranial hemorrhage, mass lesion, or abnormal extra-axial fluid collection. No midline shift or focal mass effect. Ventricular system is normal in size and configuration. Mild generalized atrophy and chronic small vessel disease throughout  the supratentorial white matter..    The gray-white matter differentiation is preserved.    Visualized paranasal sinuses are clear. Visualized mastoid air cells are clear.    No acute osseous abnormality.    Partially visualized endotracheal tube on the  images with secretions in the nasopharynx likely related to recent intubation.      Impression:        1.  No acute intracranial abnormality.    Signer Name: AISHA SHANNON MD   Signed: 12/1/2020 7:15 AM   Workstation Name: [id number]    [facility]        Results for orders placed during the hospital encounter of 10/20/19   Adult Transthoracic Echo Complete W/ Cont if Necessary Per Protocol     Narrative · Left ventricular systolic function is hyperdynamic (EF > 70).  · Mild tricuspid valve regurgitation is present.  · Estimated right ventricular systolic pressure from tricuspid   regurgitation is markedly elevated (>55 mmHg).  · There is a left pleural effusion.          I have reviewed the current medications.    Assessment / Plan     Active Hospital Problems    Diagnosis POA   • **Acute on chronic respiratory failure with hypoxia (CMS/HCC) [J96.21] Yes   • Unresponsive episode [R41.89] Yes   • YUE (acute kidney injury) (CMS/HCC) [N17.9] Yes   • Anemia, chronic disease [D63.8] Yes   • Acute on chronic diastolic CHF (congestive heart failure) (CMS/[facility]) [I50.33] Yes   • Pulmonary hypertension (CMS/HCC) [I27.20] Yes   • Thrombocytopenia (CMS/[facility]) [D69.6] Yes   • Chronic renal impairment, stage 3 (moderate) [N18.30] Yes   • Essential hypertension [I10] Yes            • Waldenstrom macroglobulinemia (CMS/[facility]) [C88.0] Yes                Pertinent Assessment & Plan:   - EKG = sinus josy  -  labs obtained  - ABG w/ CO2 96.8, d/w patient and she is agreeable to wear BIPAP  - repeat troponin, cbc, pt/inr, ptt, phos, mag, cmp  - will call and d/w patient's son the plan of care      CODE STATUS:    Code Status and Medical Interventions:   Ordered at: 12/01/20 1043     Limited Support to NOT Include:    Intubation     Code Status:    No CPR     Medical Interventions (Level of Support Prior to Arrest):    Limited         Critical Care time spent in direct patient care:    35 minutes (excluding procedure time, if applicable) including high complexity decision making to assess and treat vital organ system failure in this individual who has impairment of one or more vital organ systems such that there is a high probability of imminent or life threatening deterioration in the patient’s condition. Failure to initiate the above interventions on an urgent basis would likely result in sudden, clinically significant or life  threatening deterioration in the patient's condition.      DANIELLE Varghese  12/02/20      Electronically signed by DANIELLE Varghese, 12/02/20, 2:55 AM EST.    Pt seen and examined and reviewed treatment plan with DANIELLE as documented above.   Note reviewed and edited.     Electronically signed by Yessenia Santiago DO, 12/02/20, 3:01 AM EST.

## 2020-12-02 NOTE — PROGRESS NOTES
"Palliative Care Progress Note    Date of Admission: 12/1/2020    Code Status:   Current Code Status     Date Active Code Status Order ID Comments User Context       12/1/2020 1043 No CPR [id number]  Lacy Gilmore MD ED     Advance Care Planning Activity      Questions for Current Code Status     Question Answer Comment    Code Status No CPR     Medical Interventions (Level of Support Prior to Arrest) Limited     Limited Support to NOT Include Intubation         Subjective:  Patient minimally responds. Appears relaxed and comfortable.   SonYared, at bedside  Reviewed with nursing, prn morphine and lorazepam 0.5mg administered  Reviewed current scheduled and prn medications for route, type, dose, and frequency. Reviewed medical record     •  acetaminophen  •  albuterol  •  benzonatate  •  calcium carbonate EX  •  HYDROcodone-acetaminophen  •  LORazepam  •  Morphine **AND** naloxone  •  ondansetron  •  sodium chloride    Objective:  PPS 10% /42 (BP Location: Right arm, Patient Position: Lying)   Pulse 57   Temp 97.8 °F (36.6 °C) (Oral)   Resp 16   Ht 157.5 cm (62\")   Wt 59.4 kg (131 lb)   SpO2 96%   BMI 23.96 kg/m²    Intake & Output (last day)       12/01 0701 - 12/02 0700 12/02 0701 - 12/03 0700    P.O. 120     IV Piggyback 1000     Total Intake(mL/kg) 1120 (18.9)     Urine (mL/kg/hr) 425 (0.3)     Total Output 425     Net +695               Lab Results (last 24 hours)     Procedure Component Value Units Date/Time    Blood Culture - Blood, Wrist, Left [[id number]] Collected: 12/01/20 0750    Specimen: Blood from Wrist, Left Updated: 12/02/20 0830     Blood Culture No growth at 24 hours    Blood Culture - Blood, Hand, Left [[id number]] Collected: 12/01/20 0725    Specimen: Blood from Hand, Left Updated: 12/02/20 0830     Blood Culture No growth at 24 hours    Troponin [[id number]]  (Abnormal) Collected: 12/02/20 0207    Specimen: Blood Updated: 12/02/20 0342     Troponin T 0.143 ng/mL     Narrative:      " Troponin T Reference Range:  <= 0.03 ng/mL-   Negative for AMI  >0.03 ng/mL-     Abnormal for myocardial necrosis.  Clinicians would have to utilize clinical acumen, EKG, Troponin and serial changes to determine if it is an Acute Myocardial Infarction or myocardial injury due to an underlying chronic condition.       Results may be falsely decreased if patient taking Biotin.      Basic Metabolic Panel [[id number]]  (Abnormal) Collected: 12/02/20 0206    Specimen: Blood Updated: 12/02/20 0332     Glucose 102 mg/dL      BUN 43 mg/dL      Creatinine 2.18 mg/dL      Sodium 143 mmol/L      Potassium 5.0 mmol/L      Chloride 97 mmol/L      CO2 36.0 mmol/L      Calcium 8.2 mg/dL      eGFR Non African Amer 22 mL/min/1.73      BUN/Creatinine Ratio 19.7     Anion Gap 10.0 mmol/L     Narrative:      GFR Normal >60  Chronic Kidney Disease <60  Kidney Failure <15      Comprehensive Metabolic Panel [[id number]]  (Abnormal) Collected: 12/02/20 0207    Specimen: Blood Updated: 12/02/20 0331     Glucose 103 mg/dL      BUN 43 mg/dL      Creatinine 2.21 mg/dL      Sodium 144 mmol/L      Potassium 5.1 mmol/L      Chloride 98 mmol/L      CO2 39.0 mmol/L      Calcium 8.3 mg/dL      Total Protein 6.3 g/dL      Albumin 3.10 g/dL      ALT (SGPT) 6 U/L      AST (SGOT) 20 U/L      Alkaline Phosphatase 61 U/L      Total Bilirubin 0.7 mg/dL      eGFR Non African Amer 21 mL/min/1.73      Globulin 3.2 gm/dL      A/G Ratio 1.0 g/dL      BUN/Creatinine Ratio 19.5     Anion Gap 7.0 mmol/L     Narrative:      GFR Normal >60  Chronic Kidney Disease <60  Kidney Failure <15      Magnesium [[id number]]  (Normal) Collected: 12/02/20 0207    Specimen: Blood Updated: 12/02/20 0331     Magnesium 2.2 mg/dL     Phosphorus [[id number]]  (Abnormal) Collected: 12/02/20 0207    Specimen: Blood Updated: 12/02/20 0331     Phosphorus 5.9 mg/dL     CBC & Differential [[id number]]  (Abnormal) Collected: 12/02/20 0207    Specimen: Blood Updated: 12/02/20 0259    Narrative:       The following orders were created for panel order CBC & Differential.  Procedure                               Abnormality         Status                     ---------                               -----------         ------                     Scan Slide[[id number]]                                       Final result               CBC Auto Differential[[id number]]        Abnormal            Final result                 Please view results for these tests on the individual orders.    CBC Auto Differential [[id number]]  (Abnormal) Collected: 12/02/20 0207    Specimen: Blood Updated: 12/02/20 0259     WBC 4.17 10*3/mm3      RBC 3.07 10*6/mm3      Hemoglobin 7.8 g/dL      Hematocrit 29.6 %      MCV 96.4 fL      MCH 25.4 pg      MCHC 26.4 g/dL      RDW 15.5 %      RDW-SD 54.1 fl      MPV 9.9 fL      Platelets 133 10*3/mm3      Neutrophil % 68.4 %      Lymphocyte % 23.0 %      Monocyte % 7.2 %      Eosinophil % 0.2 %      Basophil % 0.2 %      Immature Grans % 1.0 %      Neutrophils, Absolute 2.85 10*3/mm3      Lymphocytes, Absolute 0.96 10*3/mm3      Monocytes, Absolute 0.30 10*3/mm3      Eosinophils, Absolute 0.01 10*3/mm3      Basophils, Absolute 0.01 10*3/mm3      Immature Grans, Absolute 0.04 10*3/mm3      nRBC 0.0 /100 WBC     Narrative:      Appended report. These results have been appended to a previously verified report.    Scan Slide [[id number]] Collected: 12/02/20 0207    Specimen: Blood Updated: 12/02/20 0259     Hypochromia Slight/1+     Polychromasia Slight/1+     Schistocytes Slight/1+     Stomatocytes Slight/1+     WBC Morphology Normal     Platelet Morphology Normal    aPTT [[id number]]  (Normal) Collected: 12/02/20 0207    Specimen: Blood Updated: 12/02/20 0249     PTT 34.8 seconds     Narrative:      PTT = The equivalent PTT values for the therapeutic range of heparin levels at 0.3 to 0.5 U/ml are 55 to 70 seconds.    Protime-INR [[id number]]  (Abnormal) Collected: 12/02/20 0207    Specimen: Blood  Updated: 12/02/20 0249     Protime 16.1 Seconds      INR 1.33    Blood Gas, Arterial With Co-Ox [[id number]]  (Abnormal) Collected: 12/02/20 0203    Specimen: Arterial Blood Updated: 12/02/20 0203     Site Right Radial     Gabino's Test N/A     pH, Arterial 7.223 pH units      Comment: 84 Value below reference range        pCO2, Arterial 96.8 mm Hg      Comment: 86 Value above critical limit        pO2, Arterial 79.6 mm Hg      Comment: 84 Value below reference range        HCO3, Arterial 39.8 mmol/L      Base Excess, Arterial 10.5 mmol/L      Hemoglobin, Blood Gas 7.6 g/dL      Comment: 84 Value below reference range        Hematocrit, Blood Gas 23.2 %      Oxyhemoglobin 94.4 %      Methemoglobin --     Comment: 94 Value below reportable range < _0.1        Carboxyhemoglobin 1.8 %      CO2 Content 42.8 mmol/L      Temperature 37.0 C      Barometric Pressure for Blood Gas --     Comment: N/A        Modality Nasal Cannula     FIO2 34 %      Rate 0 Breaths/minute      PIP 0 cmH2O      Comment: Meter: [id number]     :  [id number]        IPAP 0     EPAP 0     Note --     Notified Who D STEP MD     Notified By [id number]     Notified Time 12/02/2020 02:05     pH, Temp Corrected 7.223 pH Units      pCO2, Temperature Corrected 96.8 mm Hg      pO2, Temperature Corrected 79.6 mm Hg     POC Glucose Once [[id number]]  (Normal) Collected: 12/02/20 0156    Specimen: Blood Updated: 12/02/20 0156     Glucose 106 mg/dL     Troponin [[id number]]  (Abnormal) Collected: 12/01/20 2126    Specimen: Blood Updated: 12/01/20 2232     Troponin T 0.160 ng/mL     Narrative:      Troponin T Reference Range:  <= 0.03 ng/mL-   Negative for AMI  >0.03 ng/mL-     Abnormal for myocardial necrosis.  Clinicians would have to utilize clinical acumen, EKG, Troponin and serial changes to determine if it is an Acute Myocardial Infarction or myocardial injury due to an underlying chronic condition.       Results may be falsely decreased if patient  taking Biotin.      Troponin [[id number]]  (Abnormal) Collected: 12/01/20 1858    Specimen: Blood Updated: 12/01/20 1951     Troponin T 0.170 ng/mL     Narrative:      Troponin T Reference Range:  <= 0.03 ng/mL-   Negative for AMI  >0.03 ng/mL-     Abnormal for myocardial necrosis.  Clinicians would have to utilize clinical acumen, EKG, Troponin and serial changes to determine if it is an Acute Myocardial Infarction or myocardial injury due to an underlying chronic condition.       Results may be falsely decreased if patient taking Biotin.      Lactic Acid, Reflex [[id number]]  (Normal) Collected: 12/01/20 1858    Specimen: Blood Updated: 12/01/20 1939     Lactate 0.5 mmol/L      Comment: Falsely depressed results may occur on samples drawn from patients receiving N-Acetylcysteine (NAC) or Metamizole.       BNP [[id number]]  (Abnormal) Collected: 12/01/20 0749    Specimen: Blood Updated: 12/01/20 1414     proBNP 10,928.0 pg/mL     Narrative:      Among patients with dyspnea, NT-proBNP is highly sensitive for the detection of acute congestive heart failure. In addition NT-proBNP of <300 pg/ml effectively rules out acute congestive heart failure with 99% negative predictive value.    Results may be falsely decreased if patient taking Biotin.      Ferritin [[id number]]  (Normal) Collected: 12/01/20 0749    Specimen: Blood Updated: 12/01/20 1414     Ferritin 105.20 ng/mL     Narrative:      Results may be falsely decreased if patient taking Biotin.      Iron Profile [[id number]]  (Abnormal) Collected: 12/01/20 0749    Specimen: Blood Updated: 12/01/20 1409     Iron 32 mcg/dL      Iron Saturation 10 %      Transferrin 225 mg/dL      TIBC 335 mcg/dL         Imaging Results (Last 24 Hours)     Procedure Component Value Units Date/Time    NM Lung Scan Perfusion Particulate [[id number]] Collected: 12/01/20 1359     Updated: 12/01/20 1404    Narrative:      EXAMINATION: NM LUNG SCAN PERFUSION PARTICULATE-      INDICATION:  "Respiratory failure, not elsewhere classified      TECHNIQUE: 5.3 mCi of Technetium 99 MAA was injected intravenously.  Perfusion imaging was obtained in the anterior, posterior, oblique, and  lateral projections.     COMPARISON: Chest x-ray performed same day     FINDINGS: There is fairly symmetric perfusion pattern present involving  both lungs without segmental defects to suggest acute pulmonary embolus.  Moderate obscuration due to cardiac enlargement.       Impression:      Low probability for pulmonary embolism.      This report was finalized on 12/1/2020 2:01 PM by Иван Garcia.           Physical Exam:  Gen: NAD, resting in bed  Skin: warm, dry   Eyes: NILDA, conjunctiva clear and moist   HEENT: oropharynx with dry lips  Resp/thorax: even shallow effort, non labored, diminished air exchange  CV: RRR   ABD: soft, nondistended  Ext: no edema, no redness, faint radial pulses   Neuro: opens eyes to name, no myoclonus     Reviewed labs and diagnostic results.   No results found for: HGBA1C  Results from last 7 days   Lab Units 12/02/20  0207   WBC 10*3/mm3 4.17   HEMOGLOBIN g/dL 7.8*   HEMATOCRIT % 29.6*   PLATELETS 10*3/mm3 133*     Results from last 7 days   Lab Units 12/02/20  0207   SODIUM mmol/L 144   POTASSIUM mmol/L 5.1   CHLORIDE mmol/L 98   CO2 mmol/L 39.0*   BUN mg/dL 43*   CREATININE mg/dL 2.21*   CALCIUM mg/dL 8.3*   BILIRUBIN mg/dL 0.7   ALK PHOS U/L 61   ALT (SGPT) U/L 6   AST (SGOT) U/L 20   GLUCOSE mg/dL 103*       Impression: 80 y.o. female with Waldenstrom macroglobinemia, acute hypoxic respiratory failure    Plan:  Dyspnea - continue to use prn morphine, lorazepam    Goals of care - spoke with son, Yared, at bedside.  He is waiting to speak with Dr Alvarez and shared \"will see how she does today\".  Hospice nurse has made visit with the son.  Reviewed some of expected changes and that patient should look comfortable to him.   Palliative care team provide support to patient and son.    Time: " 40 minutes   > 50% time spent in counseling and education concerning current orders for symptom management with son    Kamilah CARLISLE Tyrone, APRN  [phone]  12/02/20  13:45 EST

## 2020-12-02 NOTE — PROGRESS NOTES
Continued Stay Note  [facility]     Patient Name: Carin Yusuf  MRN: [id number]  Today's Date: 12/2/2020    Admit Date: 12/1/2020    Consult received and chart reviewed.I went to see the patient  and spoke with the son. I reviewed his options for hospice care. He preferrs home hospice at this time. Son wants to talk to the pt oncologist first. I informed him that we can wait and see how she is doing throught out the day and if she perks up we will get her home otherwise we will admit her as inpatient.            Gabby Noe, MAE

## 2020-12-02 NOTE — NURSING NOTE
Rapid Response called at 0150 for increased lethargy and what appeared to be expressive aphasia. NIH attempted, unsuccessful d/t decreased LOC. ABG showed elevated arterial CO2 of 97. BiPAP attempted, pt refused. Contacted son regarding further POC. Son approved to be at bedside. Possible hospice consult today. VS remain stable on 3L NC. Will continue to monitor.

## 2020-12-03 NOTE — PROGRESS NOTES
[facility] Medicine Services  PROGRESS NOTE    Patient Name: Carin Yusuf  : 1940  MRN: [id number]    Date of Admission: 2020  Primary Care Physician: Hilario Vasques MD    Subjective   Subjective     CC:  F/u unresponsiveness    HPI:  Patient is resting in bed. She opens eyes to stimuli.    She has been declining since admission. A RRT was called for her respiratory failure. She declined BiPAP and is currently on nasal cannula. Her son is bedside.     Review of Systems  Unable to assess due to mental status    Objective   Objective     Vital Signs:   Temp:  [96.9 °F (36.1 °C)-98 °F (36.7 °C)] 97.8 °F (36.6 °C)  Heart Rate:  [57] 57  Resp:  [16-18] 16  BP: (113-122)/(42-60) 114/42        Physical Exam:  Constitutional: No acute distress, elderly female   HENT: NCAT, mucous membranes moist  Respiratory: Clear to auscultation bilaterally, respiratory effort mildy labored   Cardiovascular: RRR, no murmur   Gastrointestinal: Positive bowel sounds, soft, nontender, nondistended  Musculoskeletal: No bilateral ankle edema  Psychiatric: Unable to assess   Neurologic: Opens eyes to stimuli   Skin: No rashes      Results Reviewed:  Results from last 7 days   Lab Units 20  0207 20  0749   WBC 10*3/mm3 4.17 4.36   HEMOGLOBIN g/dL 7.8* 7.8*   HEMATOCRIT % 29.6* 28.3*   PLATELETS 10*3/mm3 133* 136*   INR  1.33*  --    PROCALCITONIN ng/mL  --  0.23     Results from last 7 days   Lab Units 20  0207 20  0206 20  2126 20  1858 20  0749   SODIUM mmol/L 144 143  --   --  145   POTASSIUM mmol/L 5.1 5.0  --   --  4.7   CHLORIDE mmol/L 98 97*  --   --  95*   CO2 mmol/L 39.0* 36.0*  --   --  40.0*   BUN mg/dL 43* 43*  --   --  42*   CREATININE mg/dL 2.21* 2.18*  --   --  2.42*   GLUCOSE mg/dL 103* 102*  --   --  144*   CALCIUM mg/dL 8.3* 8.2*  --   --  9.1   ALT (SGPT) U/L 6  --   --   --  8   AST (SGOT) U/L 20  --   --   --  35*   TROPONIN T ng/mL  0.143*  --  0.160* 0.170* 0.031*   PROBNP pg/mL  --   --   --   --  10,928.0*     Estimated Creatinine Clearance: 18.4 mL/min (A) (by C-G formula based on SCr of 2.21 mg/dL (H)).    Microbiology Results Abnormal     Procedure Component Value - Date/Time    Blood Culture - Blood, Wrist, Left [[id number]] Collected: 12/01/20 0750    Lab Status: Preliminary result Specimen: Blood from Wrist, Left Updated: 12/02/20 0830     Blood Culture No growth at 24 hours    Blood Culture - Blood, Hand, Left [[id number]] Collected: 12/01/20 0725    Lab Status: Preliminary result Specimen: Blood from Hand, Left Updated: 12/02/20 0830     Blood Culture No growth at 24 hours    Respiratory Panel PCR w/COVID-19(SARS-CoV-2) DESI/TANA/BOYD/PAD/COR/MAD/KAREN In-House, NP Swab in UTM/VTM, 3-4 HR TAT - Swab, Nasopharynx [[id number]]  (Normal) Collected: 12/01/20 0752    Lab Status: Final result Specimen: Swab from Nasopharynx Updated: 12/01/20 0959     ADENOVIRUS, PCR Not Detected     Coronavirus 229E Not Detected     Coronavirus HKU1 Not Detected     Coronavirus NL63 Not Detected     Coronavirus OC43 Not Detected     COVID19 Not Detected     Human Metapneumovirus Not Detected     Human Rhinovirus/Enterovirus Not Detected     Influenza A PCR Not Detected     Influenza A H1 Not Detected     Influenza A H1 2009 PCR Not Detected     Influenza A H3 Not Detected     Influenza B PCR Not Detected     Parainfluenza Virus 1 Not Detected     Parainfluenza Virus 2 Not Detected     Parainfluenza Virus 3 Not Detected     Parainfluenza Virus 4 Not Detected     RSV, PCR Not Detected     Bordetella pertussis pcr Not Detected     Bordetella parapertussis PCR Not Detected     Chlamydophila pneumoniae PCR Not Detected     Mycoplasma pneumo by PCR Not Detected    Narrative:      Fact sheet for providers: https://docs.[employer]/wp-content/uploads/PIW3101-5771-IR6.1-EUA-Provider-Fact-Sheet-3.pdf    Fact sheet for patients:  https://docs.[employer]/wp-content/uploads/RRT4477-8870-AW5.1-EUA-Patient-Fact-Sheet-1.pdf          Imaging Results (Last 24 Hours)     ** No results found for the last 24 hours. **          Results for orders placed during the hospital encounter of 10/20/19   Adult Transthoracic Echo Complete W/ Cont if Necessary Per Protocol    Narrative · Left ventricular systolic function is hyperdynamic (EF > 70).  · Mild tricuspid valve regurgitation is present.  · Estimated right ventricular systolic pressure from tricuspid   regurgitation is markedly elevated (>55 mmHg).  · There is a left pleural effusion.          I have reviewed the medications:  Scheduled Meds:furosemide, 40 mg, Intravenous, Q12H  Morphine, 1 mg, Intravenous, Once  palliative care oral rinse, , Mouth/Throat, 4x Daily  sodium chloride, 10 mL, Intravenous, Q12H      Continuous Infusions:   PRN Meds:.•  acetaminophen  •  albuterol  •  benzonatate  •  calcium carbonate EX  •  HYDROcodone-acetaminophen  •  LORazepam  •  Morphine **AND** naloxone  •  ondansetron  •  sodium chloride    Assessment/Plan   Assessment & Plan     Active Hospital Problems    Diagnosis  POA   • **Acute on chronic respiratory failure with hypoxia (CMS/HCC) [J96.21]  Yes   • Unresponsive episode [R41.89]  Yes   • YUE (acute kidney injury) (CMS/HCC) [N17.9]  Yes   • Anemia, chronic disease [D63.8]  Yes   • Acute on chronic diastolic CHF (congestive heart failure) (CMS/HCC) [I50.33]  Yes   • Pulmonary hypertension (CMS/HCC) [I27.20]  Yes   • Thrombocytopenia (CMS/HCC) [D69.6]  Yes   • Chronic renal impairment, stage 3 (moderate) [N18.30]  Yes   • Essential hypertension [I10]  Yes   • Waldenstrom macroglobulinemia (CMS/HCC) [C88.0]  Yes      Resolved Hospital Problems   No resolved problems to display.        Brief Hospital Course to date:  Carin Yusuf is a 80 y.o. female with chronic respiratory failure, CKD III, HTN, diastolic heart failure lymphoma, was admitted after  unresponsive episode at home.    Unresponsive episode  S/p mechanical fall       A/C resp failure due to   A/C DHF with severe pulm HTN  - s/p brief intubation in ED now extubated, per family she is a DNR/DNI  - no evidence of pneumonia by imaging, neg procal, and normal WBC  - VQ scan low probability PE  - ECHO (last done Oct 2019 EF 70% and RVSP >55)  - PT has RRT for respiratory failure, she declined BIpap     YUE    Waldenstrom macroglobinemia / Lymphoplasmacytic lymphoma  Anemia chronic dz  Chronic TCP  Splenomegaly  - outpatient ibrutinib has recently been stopped due to refractory disease  -Dr Alvarez following      HTN     GOC  The patient son has decided to make her comfort measures only. Hospice consulted. He would like to try and take her home. If unable to she will be on inpatient hospice.            DVT Prophylaxis:      Disposition: I expect the patient to be discharged     CODE STATUS:   Code Status and Medical Interventions:   Ordered at: 12/01/20 1043     Limited Support to NOT Include:    Intubation     Code Status:    No CPR     Medical Interventions (Level of Support Prior to Arrest):    Limited       Onelia Whitley, DO  12/02/20

## 2020-12-03 NOTE — SIGNIFICANT NOTE
Exam confirms with auscultation zero audible heart tones and zero audible respirations. Ms.Stella JOE Yusuf was pronounced dead at 0230.  MD notified by Patient's RN.    Geovanna Jacobson RN  Clinical House Supervisor  12/3/2020 03:10 EST

## 2020-12-03 NOTE — TELEPHONE ENCOUNTER
Patient's son called regarding his mom who passed away today early morning, wants to speak with Dr. Vasques and if he can get a call back.   Thank you.

## 2020-12-03 NOTE — NURSING NOTE
Pt..House Supervisor notified, confirmed time of death 230. IV lines removed, pt.cleaned, gown changed. All pt.belongings sent w/ son home. Pt. In bodybag with ID tags in place. Transferred to [facility].

## 2020-12-06 LAB
BACTERIA SPEC AEROBE CULT: NORMAL
BACTERIA SPEC AEROBE CULT: NORMAL

## 2020-12-07 LAB
QT INTERVAL: 442 MS
QTC INTERVAL: 411 MS

## 2020-12-11 NOTE — DISCHARGE SUMMARY
[facility] Medicine Services  DEATH SUMMARY    Patient Name: Carin Yusuf  : 1940  MRN: [id number]    Date of Admission: 2020  Date of Death:  12/3/2020  Time of Death:  0230    Primary Care Physician: Hilario Vasques MD    Consults     Date and Time Order Name Status Description    2020 1345 Inpatient Palliative Care MD Consult Completed           Summary of Hospital Events     Presenting Problem:   Unresponsive episode [R41.89]  Unresponsive episode [R41.89]    Active Hospital Problems    Diagnosis  POA   • **Acute on chronic respiratory failure with hypoxia (CMS/HCC) [J96.21]  Yes   • Unresponsive episode [R41.89]  Yes   • YUE (acute kidney injury) (CMS/HCC) [N17.9]  Yes   • Anemia, chronic disease [D63.8]  Yes   • Acute on chronic diastolic CHF (congestive heart failure) (CMS/HCC) [I50.33]  Yes   • Pulmonary hypertension (CMS/HCC) [I27.20]  Yes   • Thrombocytopenia (CMS/HCC) [D69.6]  Yes   • Chronic renal impairment, stage 3 (moderate) [N18.30]  Yes   • Essential hypertension [I10]  Yes   • Waldenstrom macroglobulinemia (CMS/HCC) [C88.0]  Yes      Resolved Hospital Problems   No resolved problems to display.          Hospital Course:  Carin Yusuf was a 80 y.o. female with chronic respiratory failure, CKD III, HTN, diastolic heart failure lymphoma, was admitted after unresponsive episode at home. She was briefly intubated in the ED but was extubated when notified that she was DNR/DNI. She was admitted and was stable on nasal cannula but then respirator status declined. She refused Bipap. Her son then decided to make her comfort measures only. She passed the following day.       Official Cause of Death and any directly related diagnoses:  Acute Respiratory Failure, DHF with pulmonary hypertension, lymphoplastic lymphoma, Waldenstrom macroglobinemia       Onelia Whitley, DO  12/10/20

## 2020-12-16 ENCOUNTER — TELEPHONE (OUTPATIENT)
Dept: ONCOLOGY | Facility: CLINIC | Age: 80
End: 2020-12-16

## 2020-12-16 NOTE — TELEPHONE ENCOUNTER
Altagracia, a pharmacist representing a company for [employer] called regarding pt. She wanted to speak with somebody to ask some questions regarding a safe report that was filed for the pt and also ask if we feel that her passing is related to the drug in any way.    Please call her back at  option 3. The case number is [id number].

## 2020-12-17 NOTE — TELEPHONE ENCOUNTER
I called Altagracia at the [employer]  and answered questions about the patients passing and [employer]. Had talked with Dr Alvarez and the death was not related to [employer].

## 2022-11-30 NOTE — PROGRESS NOTES
Received notification and verbal from team for plan to reduce patient's Ibrutinib to Ibrutinib 140mg PO daily. Called [facility] and spoke with Laureen at [phone] (patient fills through [facility]) to provide verbal order  with updated tablet strength.   
no
